# Patient Record
Sex: FEMALE | Race: WHITE | NOT HISPANIC OR LATINO
[De-identification: names, ages, dates, MRNs, and addresses within clinical notes are randomized per-mention and may not be internally consistent; named-entity substitution may affect disease eponyms.]

---

## 2019-08-08 PROBLEM — Z00.00 ENCOUNTER FOR PREVENTIVE HEALTH EXAMINATION: Status: ACTIVE | Noted: 2019-08-08

## 2019-09-04 ENCOUNTER — APPOINTMENT (OUTPATIENT)
Dept: VASCULAR SURGERY | Facility: CLINIC | Age: 79
End: 2019-09-04

## 2023-12-29 ENCOUNTER — INPATIENT (INPATIENT)
Facility: HOSPITAL | Age: 83
LOS: 122 days | Discharge: NURSING, REHAB OR LONG-TERM CARE | End: 2024-04-30
Attending: INTERNAL MEDICINE | Admitting: INTERNAL MEDICINE
Payer: MEDICARE

## 2023-12-29 VITALS
RESPIRATION RATE: 16 BRPM | SYSTOLIC BLOOD PRESSURE: 158 MMHG | HEART RATE: 68 BPM | DIASTOLIC BLOOD PRESSURE: 93 MMHG | TEMPERATURE: 98 F | OXYGEN SATURATION: 100 %

## 2023-12-29 DIAGNOSIS — Z09 ENCOUNTER FOR FOLLOW-UP EXAMINATION AFTER COMPLETED TREATMENT FOR CONDITIONS OTHER THAN MALIGNANT NEOPLASM: ICD-10-CM

## 2023-12-29 DIAGNOSIS — G93.41 METABOLIC ENCEPHALOPATHY: ICD-10-CM

## 2023-12-29 DIAGNOSIS — N30.00 ACUTE CYSTITIS WITHOUT HEMATURIA: ICD-10-CM

## 2023-12-29 DIAGNOSIS — N39.0 URINARY TRACT INFECTION, SITE NOT SPECIFIED: ICD-10-CM

## 2023-12-29 LAB
ALBUMIN SERPL ELPH-MCNC: 3.6 G/DL — SIGNIFICANT CHANGE UP (ref 3.3–5)
ALBUMIN SERPL ELPH-MCNC: 3.6 G/DL — SIGNIFICANT CHANGE UP (ref 3.3–5)
ALP SERPL-CCNC: 41 U/L — SIGNIFICANT CHANGE UP (ref 40–120)
ALP SERPL-CCNC: 41 U/L — SIGNIFICANT CHANGE UP (ref 40–120)
ALT FLD-CCNC: 8 U/L — SIGNIFICANT CHANGE UP (ref 4–33)
ALT FLD-CCNC: 8 U/L — SIGNIFICANT CHANGE UP (ref 4–33)
ANION GAP SERPL CALC-SCNC: 10 MMOL/L — SIGNIFICANT CHANGE UP (ref 7–14)
ANION GAP SERPL CALC-SCNC: 10 MMOL/L — SIGNIFICANT CHANGE UP (ref 7–14)
APPEARANCE UR: ABNORMAL
APPEARANCE UR: ABNORMAL
AST SERPL-CCNC: 20 U/L — SIGNIFICANT CHANGE UP (ref 4–32)
AST SERPL-CCNC: 20 U/L — SIGNIFICANT CHANGE UP (ref 4–32)
BACTERIA # UR AUTO: ABNORMAL /HPF
BACTERIA # UR AUTO: ABNORMAL /HPF
BASOPHILS # BLD AUTO: 0.02 K/UL — SIGNIFICANT CHANGE UP (ref 0–0.2)
BASOPHILS # BLD AUTO: 0.02 K/UL — SIGNIFICANT CHANGE UP (ref 0–0.2)
BASOPHILS NFR BLD AUTO: 0.2 % — SIGNIFICANT CHANGE UP (ref 0–2)
BASOPHILS NFR BLD AUTO: 0.2 % — SIGNIFICANT CHANGE UP (ref 0–2)
BILIRUB SERPL-MCNC: 0.4 MG/DL — SIGNIFICANT CHANGE UP (ref 0.2–1.2)
BILIRUB SERPL-MCNC: 0.4 MG/DL — SIGNIFICANT CHANGE UP (ref 0.2–1.2)
BILIRUB UR-MCNC: NEGATIVE — SIGNIFICANT CHANGE UP
BILIRUB UR-MCNC: NEGATIVE — SIGNIFICANT CHANGE UP
BUN SERPL-MCNC: 19 MG/DL — SIGNIFICANT CHANGE UP (ref 7–23)
BUN SERPL-MCNC: 19 MG/DL — SIGNIFICANT CHANGE UP (ref 7–23)
CALCIUM SERPL-MCNC: 9 MG/DL — SIGNIFICANT CHANGE UP (ref 8.4–10.5)
CALCIUM SERPL-MCNC: 9 MG/DL — SIGNIFICANT CHANGE UP (ref 8.4–10.5)
CAST: 5 /LPF — HIGH (ref 0–4)
CAST: 5 /LPF — HIGH (ref 0–4)
CHLORIDE SERPL-SCNC: 102 MMOL/L — SIGNIFICANT CHANGE UP (ref 98–107)
CHLORIDE SERPL-SCNC: 102 MMOL/L — SIGNIFICANT CHANGE UP (ref 98–107)
CO2 SERPL-SCNC: 28 MMOL/L — SIGNIFICANT CHANGE UP (ref 22–31)
CO2 SERPL-SCNC: 28 MMOL/L — SIGNIFICANT CHANGE UP (ref 22–31)
COD CRY URNS QL: PRESENT
COD CRY URNS QL: PRESENT
COLOR SPEC: SIGNIFICANT CHANGE UP
COLOR SPEC: SIGNIFICANT CHANGE UP
CREAT SERPL-MCNC: 0.83 MG/DL — SIGNIFICANT CHANGE UP (ref 0.5–1.3)
CREAT SERPL-MCNC: 0.83 MG/DL — SIGNIFICANT CHANGE UP (ref 0.5–1.3)
DIFF PNL FLD: ABNORMAL
DIFF PNL FLD: ABNORMAL
EGFR: 70 ML/MIN/1.73M2 — SIGNIFICANT CHANGE UP
EGFR: 70 ML/MIN/1.73M2 — SIGNIFICANT CHANGE UP
EOSINOPHIL # BLD AUTO: 0.03 K/UL — SIGNIFICANT CHANGE UP (ref 0–0.5)
EOSINOPHIL # BLD AUTO: 0.03 K/UL — SIGNIFICANT CHANGE UP (ref 0–0.5)
EOSINOPHIL NFR BLD AUTO: 0.3 % — SIGNIFICANT CHANGE UP (ref 0–6)
EOSINOPHIL NFR BLD AUTO: 0.3 % — SIGNIFICANT CHANGE UP (ref 0–6)
GLUCOSE SERPL-MCNC: 126 MG/DL — HIGH (ref 70–99)
GLUCOSE SERPL-MCNC: 126 MG/DL — HIGH (ref 70–99)
GLUCOSE UR QL: NEGATIVE MG/DL — SIGNIFICANT CHANGE UP
GLUCOSE UR QL: NEGATIVE MG/DL — SIGNIFICANT CHANGE UP
HCT VFR BLD CALC: 37.1 % — SIGNIFICANT CHANGE UP (ref 34.5–45)
HCT VFR BLD CALC: 37.1 % — SIGNIFICANT CHANGE UP (ref 34.5–45)
HGB BLD-MCNC: 12.6 G/DL — SIGNIFICANT CHANGE UP (ref 11.5–15.5)
HGB BLD-MCNC: 12.6 G/DL — SIGNIFICANT CHANGE UP (ref 11.5–15.5)
HYALINE CASTS # UR AUTO: PRESENT
HYALINE CASTS # UR AUTO: PRESENT
IANC: 6.64 K/UL — SIGNIFICANT CHANGE UP (ref 1.8–7.4)
IANC: 6.64 K/UL — SIGNIFICANT CHANGE UP (ref 1.8–7.4)
IMM GRANULOCYTES NFR BLD AUTO: 0.3 % — SIGNIFICANT CHANGE UP (ref 0–0.9)
IMM GRANULOCYTES NFR BLD AUTO: 0.3 % — SIGNIFICANT CHANGE UP (ref 0–0.9)
KETONES UR-MCNC: 15 MG/DL
KETONES UR-MCNC: 15 MG/DL
LEUKOCYTE ESTERASE UR-ACNC: ABNORMAL
LEUKOCYTE ESTERASE UR-ACNC: ABNORMAL
LYMPHOCYTES # BLD AUTO: 1.17 K/UL — SIGNIFICANT CHANGE UP (ref 1–3.3)
LYMPHOCYTES # BLD AUTO: 1.17 K/UL — SIGNIFICANT CHANGE UP (ref 1–3.3)
LYMPHOCYTES # BLD AUTO: 13.6 % — SIGNIFICANT CHANGE UP (ref 13–44)
LYMPHOCYTES # BLD AUTO: 13.6 % — SIGNIFICANT CHANGE UP (ref 13–44)
MCHC RBC-ENTMCNC: 30.3 PG — SIGNIFICANT CHANGE UP (ref 27–34)
MCHC RBC-ENTMCNC: 30.3 PG — SIGNIFICANT CHANGE UP (ref 27–34)
MCHC RBC-ENTMCNC: 34 GM/DL — SIGNIFICANT CHANGE UP (ref 32–36)
MCHC RBC-ENTMCNC: 34 GM/DL — SIGNIFICANT CHANGE UP (ref 32–36)
MCV RBC AUTO: 89.2 FL — SIGNIFICANT CHANGE UP (ref 80–100)
MCV RBC AUTO: 89.2 FL — SIGNIFICANT CHANGE UP (ref 80–100)
MONOCYTES # BLD AUTO: 0.73 K/UL — SIGNIFICANT CHANGE UP (ref 0–0.9)
MONOCYTES # BLD AUTO: 0.73 K/UL — SIGNIFICANT CHANGE UP (ref 0–0.9)
MONOCYTES NFR BLD AUTO: 8.5 % — SIGNIFICANT CHANGE UP (ref 2–14)
MONOCYTES NFR BLD AUTO: 8.5 % — SIGNIFICANT CHANGE UP (ref 2–14)
NEUTROPHILS # BLD AUTO: 6.64 K/UL — SIGNIFICANT CHANGE UP (ref 1.8–7.4)
NEUTROPHILS # BLD AUTO: 6.64 K/UL — SIGNIFICANT CHANGE UP (ref 1.8–7.4)
NEUTROPHILS NFR BLD AUTO: 77.1 % — HIGH (ref 43–77)
NEUTROPHILS NFR BLD AUTO: 77.1 % — HIGH (ref 43–77)
NITRITE UR-MCNC: NEGATIVE — SIGNIFICANT CHANGE UP
NITRITE UR-MCNC: NEGATIVE — SIGNIFICANT CHANGE UP
NRBC # BLD: 0 /100 WBCS — SIGNIFICANT CHANGE UP (ref 0–0)
NRBC # BLD: 0 /100 WBCS — SIGNIFICANT CHANGE UP (ref 0–0)
NRBC # FLD: 0 K/UL — SIGNIFICANT CHANGE UP (ref 0–0)
NRBC # FLD: 0 K/UL — SIGNIFICANT CHANGE UP (ref 0–0)
PH UR: 5.5 — SIGNIFICANT CHANGE UP (ref 5–8)
PH UR: 5.5 — SIGNIFICANT CHANGE UP (ref 5–8)
PLATELET # BLD AUTO: 208 K/UL — SIGNIFICANT CHANGE UP (ref 150–400)
PLATELET # BLD AUTO: 208 K/UL — SIGNIFICANT CHANGE UP (ref 150–400)
POTASSIUM SERPL-MCNC: 3.5 MMOL/L — SIGNIFICANT CHANGE UP (ref 3.5–5.3)
POTASSIUM SERPL-MCNC: 3.5 MMOL/L — SIGNIFICANT CHANGE UP (ref 3.5–5.3)
POTASSIUM SERPL-SCNC: 3.5 MMOL/L — SIGNIFICANT CHANGE UP (ref 3.5–5.3)
POTASSIUM SERPL-SCNC: 3.5 MMOL/L — SIGNIFICANT CHANGE UP (ref 3.5–5.3)
PROT SERPL-MCNC: 6.4 G/DL — SIGNIFICANT CHANGE UP (ref 6–8.3)
PROT SERPL-MCNC: 6.4 G/DL — SIGNIFICANT CHANGE UP (ref 6–8.3)
PROT UR-MCNC: SIGNIFICANT CHANGE UP MG/DL
PROT UR-MCNC: SIGNIFICANT CHANGE UP MG/DL
RBC # BLD: 4.16 M/UL — SIGNIFICANT CHANGE UP (ref 3.8–5.2)
RBC # BLD: 4.16 M/UL — SIGNIFICANT CHANGE UP (ref 3.8–5.2)
RBC # FLD: 13.1 % — SIGNIFICANT CHANGE UP (ref 10.3–14.5)
RBC # FLD: 13.1 % — SIGNIFICANT CHANGE UP (ref 10.3–14.5)
RBC CASTS # UR COMP ASSIST: 12 /HPF — HIGH (ref 0–4)
RBC CASTS # UR COMP ASSIST: 12 /HPF — HIGH (ref 0–4)
REVIEW: SIGNIFICANT CHANGE UP
REVIEW: SIGNIFICANT CHANGE UP
SODIUM SERPL-SCNC: 140 MMOL/L — SIGNIFICANT CHANGE UP (ref 135–145)
SODIUM SERPL-SCNC: 140 MMOL/L — SIGNIFICANT CHANGE UP (ref 135–145)
SP GR SPEC: 1.03 — SIGNIFICANT CHANGE UP (ref 1–1.03)
SP GR SPEC: 1.03 — SIGNIFICANT CHANGE UP (ref 1–1.03)
SQUAMOUS # UR AUTO: 12 /HPF — HIGH (ref 0–5)
SQUAMOUS # UR AUTO: 12 /HPF — HIGH (ref 0–5)
UROBILINOGEN FLD QL: 1 MG/DL — SIGNIFICANT CHANGE UP (ref 0.2–1)
UROBILINOGEN FLD QL: 1 MG/DL — SIGNIFICANT CHANGE UP (ref 0.2–1)
WBC # BLD: 8.62 K/UL — SIGNIFICANT CHANGE UP (ref 3.8–10.5)
WBC # BLD: 8.62 K/UL — SIGNIFICANT CHANGE UP (ref 3.8–10.5)
WBC # FLD AUTO: 8.62 K/UL — SIGNIFICANT CHANGE UP (ref 3.8–10.5)
WBC # FLD AUTO: 8.62 K/UL — SIGNIFICANT CHANGE UP (ref 3.8–10.5)
WBC UR QL: 19 /HPF — HIGH (ref 0–5)
WBC UR QL: 19 /HPF — HIGH (ref 0–5)

## 2023-12-29 PROCEDURE — 99223 1ST HOSP IP/OBS HIGH 75: CPT

## 2023-12-29 PROCEDURE — 99285 EMERGENCY DEPT VISIT HI MDM: CPT

## 2023-12-29 RX ORDER — HALOPERIDOL DECANOATE 100 MG/ML
1.5 INJECTION INTRAMUSCULAR ONCE
Refills: 0 | Status: COMPLETED | OUTPATIENT
Start: 2023-12-29 | End: 2023-12-29

## 2023-12-29 RX ORDER — ACETAMINOPHEN 500 MG
650 TABLET ORAL EVERY 6 HOURS
Refills: 0 | Status: DISCONTINUED | OUTPATIENT
Start: 2023-12-29 | End: 2024-03-07

## 2023-12-29 RX ORDER — CEFTRIAXONE 500 MG/1
1000 INJECTION, POWDER, FOR SOLUTION INTRAMUSCULAR; INTRAVENOUS ONCE
Refills: 0 | Status: DISCONTINUED | OUTPATIENT
Start: 2023-12-29 | End: 2023-12-29

## 2023-12-29 RX ORDER — HALOPERIDOL DECANOATE 100 MG/ML
1 INJECTION INTRAMUSCULAR ONCE
Refills: 0 | Status: COMPLETED | OUTPATIENT
Start: 2023-12-29 | End: 2023-12-29

## 2023-12-29 RX ORDER — OLANZAPINE 15 MG/1
1.25 TABLET, FILM COATED ORAL ONCE
Refills: 0 | Status: COMPLETED | OUTPATIENT
Start: 2023-12-29 | End: 2023-12-29

## 2023-12-29 RX ORDER — CEFTRIAXONE 500 MG/1
1000 INJECTION, POWDER, FOR SOLUTION INTRAMUSCULAR; INTRAVENOUS EVERY 24 HOURS
Refills: 0 | Status: DISCONTINUED | OUTPATIENT
Start: 2023-12-30 | End: 2024-01-01

## 2023-12-29 RX ORDER — HALOPERIDOL DECANOATE 100 MG/ML
2.5 INJECTION INTRAMUSCULAR ONCE
Refills: 0 | Status: COMPLETED | OUTPATIENT
Start: 2023-12-29 | End: 2023-12-29

## 2023-12-29 RX ORDER — CEFTRIAXONE 500 MG/1
1000 INJECTION, POWDER, FOR SOLUTION INTRAMUSCULAR; INTRAVENOUS ONCE
Refills: 0 | Status: COMPLETED | OUTPATIENT
Start: 2023-12-29 | End: 2023-12-29

## 2023-12-29 RX ADMIN — HALOPERIDOL DECANOATE 1 MILLIGRAM(S): 100 INJECTION INTRAMUSCULAR at 19:24

## 2023-12-29 RX ADMIN — CEFTRIAXONE 100 MILLIGRAM(S): 500 INJECTION, POWDER, FOR SOLUTION INTRAMUSCULAR; INTRAVENOUS at 17:24

## 2023-12-29 RX ADMIN — HALOPERIDOL DECANOATE 2.5 MILLIGRAM(S): 100 INJECTION INTRAMUSCULAR at 20:32

## 2023-12-29 RX ADMIN — OLANZAPINE 1.25 MILLIGRAM(S): 15 TABLET, FILM COATED ORAL at 20:42

## 2023-12-29 RX ADMIN — HALOPERIDOL DECANOATE 1.5 MILLIGRAM(S): 100 INJECTION INTRAMUSCULAR at 19:46

## 2023-12-29 RX ADMIN — Medication 0.5 MILLIGRAM(S): at 21:02

## 2023-12-29 NOTE — ED ADULT NURSE NOTE - OBJECTIVE STATEMENT
Patient is an 84 yo female, unknown phx, brought in after found wandering in street. 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 Unkempt. Crying and asking if someone is going to be watching her dogs. Denies any complaints, denies pain. No signs of injury. VSS. 20G PIV placed to [facility], labs sent per orders. Pending results. Fall precautions maintained. Report given to primary RN Jena. Patient is an 84 yo female, unknown phx, brought in after found wandering in street. 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 Unkempt. Crying and asking if someone is going to be watching her dogs. Denies any complaints, denies pain. No signs of injury. VSS. 20G PIV placed to [facility], labs sent per orders. Pending results. Fall precautions maintained. Report given to primary RN Jena.

## 2023-12-29 NOTE — H&P ADULT - PROBLEM SELECTOR PLAN 1
Likely delirium 2/2 acute infection with underlying dementia.  - treatment of uti as below  - PRN Haldol  - will give 1 dose of ativan as pt not improving after Haldol and Zyprexa.

## 2023-12-29 NOTE — ED PROVIDER NOTE - CONSIDERATION OF ADMISSION OBSERVATION
Consideration of Admission/Observation Given concern for AMS and pt living alone, will likely require admission for further management/placement

## 2023-12-29 NOTE — ED ADULT TRIAGE NOTE - CHIEF COMPLAINT QUOTE
Pt. found wandering by her neighbors. Stated she was walking with her aide but when EMS got there was nobody with her. Pt. has no complaints at this time. Pt. A&Ox2-3. Unknown history or baseline MS.

## 2023-12-29 NOTE — ED PROVIDER NOTE - CARE PLAN
1 Principal Discharge DX:	Acute UTI  Secondary Diagnosis:	AMS (altered mental status)  Secondary Diagnosis:	Adult failure to thrive

## 2023-12-29 NOTE — ED PROVIDER NOTE - OBJECTIVE STATEMENT
84 y/o female with PMHx of Chololithiasis s/p cholecystectomy who presented to the ED for AMS today. Pt was found wondering today and when EMS arrived pt states she was walking with her aid but was not found with anyone. Pt states she has to take care of her dog. Pt denies any headache, neck pain, back pain, fever, chills, nausea, vomiting, SOB, chest pain, or abd pain. 82 y/o female with PMHx of Chololithiasis s/p cholecystectomy who presented to the ED for AMS today. Pt was found wondering today and when EMS arrived pt states she was walking with her aid but was not found with anyone. Pt states she has to take care of her dog. Pt denies any headache, neck pain, back pain, fever, chills, nausea, vomiting, SOB, chest pain, or abd pain. 84 y/o female with PMHx of Chololithiasis s/p cholecystectomy who presented to the ED for AMS today. Pt was found wondering today and when EMS arrived pt states she was walking with her aid but was not found with anyone. Pt states she has to take care of her dog. Pt denies any headache, neck pain, back pain, fever, chills, nausea, vomiting, SOB, chest pain, or abd pain.    Yadiel Mooney MD PGY-2: 83-year-old female with PMH likely dementia, no official diagnosis presents brought in by EMS because she was found wandering by her neighbors and seemed confused.  Patient does not remember what happened or why she is in the hospital at this time.  Collateral information obtained from patient's niece as well as patient's  who called.   is Nicole: [phone].  This [facility] her  [facility] [employer] [facility]   states that the patient has no one in her life to help her out and no family who takes care of her.  Patient lives alone.   has known patient for the past 5 years or so.   has made about 5 attempts over the past few years to Adult Protective Services however her case has been rejected each time.  States that patient has poor memory and does not go to any doctors.  Patient has not had any official diagnoses.   is aware of a possible psych diagnosis that happened 1 and half years ago but never got more information due to confidentiality.   talk to the patient weekly.  States that patient has 2 friends or neighbors who help out sometimes.  Patient has 1 dog.   talk to the superintendent of the patient's residence who stated that he will take care of the dog for the weekend.  Patient has become lost for a few times in the past.   department brings food to the patient sometimes and has apparently a visitor service however patient has been verbally aggressive and abusive towards the visitors in the past.  Collateral also obtained from patient's niece Leatha at [phone].  He states that she has not been to [place] in the last 18 years and is not really in touch with the patient.  States that patient has a .  Last talk to the patient a few months ago but does not know much about her history but think she has dementia.  States that patient has a sister named Leatha Francis in the [facility] but does not have contact information with her.  Collateral information also obtained by bystander who found patient wandering outside of few food grocery store.  Bystander is nurse Paulino at [phone].  Nurse Lora found her outside during shopping and the patient seemed confused and did not know where she was.  Patient was able to take Lora to her home.  Nurse Lora states that home was in a poor state, no bed sheets on the bed, was dirty but not unlivable, and believes is unsafe for patient to live at home by herself.  Patient's PCP is SUZANNA CACERES and at [phone]. 82 y/o female with PMHx of Chololithiasis s/p cholecystectomy who presented to the ED for AMS today. Pt was found wondering today and when EMS arrived pt states she was walking with her aid but was not found with anyone. Pt states she has to take care of her dog. Pt denies any headache, neck pain, back pain, fever, chills, nausea, vomiting, SOB, chest pain, or abd pain.    Yadiel Mooney MD PGY-2: 83-year-old female with PMH likely dementia, no official diagnosis presents brought in by EMS because she was found wandering by her neighbors and seemed confused.  Patient does not remember what happened or why she is in the hospital at this time.  Collateral information obtained from patient's niece as well as patient's  who called.   is Nicole: [phone].  This is her  [facility] [employer] [facility].   states that the patient has no one in her life to help her out and no family who takes care of her.  Patient lives alone.   has known patient for the past 5 years or so.   has made about 5 attempts over the past few years to Adult Protective Services however her case has been rejected each time.  States that patient has poor memory and does not go to any doctors.  Patient has not had any official diagnoses.   is aware of a possible psych diagnosis that happened 1 and half years ago but never got more information due to confidentiality.   talk to the patient weekly.  States that patient has 2 friends or neighbors who help out sometimes.  Patient has 1 dog.   talk to the superintendent of the patient's residence who stated that he will take care of the dog for the weekend.  Patient has become lost for a few times in the past.   department brings food to the patient sometimes and has apparently a visitor service however patient has been verbally aggressive and abusive towards the visitors in the past.  Collateral also obtained from patient's niece Leatha at [phone].  He states that she has not been to [place] in the last 18 years and is not really in touch with the patient.  States that patient has a .  Last talk to the patient a few months ago but does not know much about her history but think she has dementia.  States that patient has a sister named Leatha Francis in the [facility] but does not have contact information with her.  Collateral information also obtained by bystander who found patient wandering outside of few food grocery store.  Bystander is nurse Paulino at [phone].  Nurse Lora found her outside during shopping and the patient seemed confused and did not know where she was.  Patient was able to take Lora to her home.  Nurse Lora states that home was in a poor state, no bed sheets on the bed, was dirty but not unlivable, and believes is unsafe for patient to live at home by herself.  Patient's PCP is SUZANNA CACERES and at [phone].

## 2023-12-29 NOTE — H&P ADULT - NSHPPHYSICALEXAM_GEN_ALL_CORE
Vital Signs Last 24 Hrs  T(C): 36.8 (29 Dec 2023 21:13), Max: 36.9 (29 Dec 2023 14:46)  T(F): 98.3 (29 Dec 2023 21:13), Max: 98.4 (29 Dec 2023 14:46)  HR: 90 (29 Dec 2023 21:13) (68 - 90)  BP: 120/56 (29 Dec 2023 21:13) (120/56 - 158/93)  BP(mean): --  RR: 16 (29 Dec 2023 21:13) (16 - 16)  SpO2: 98% (29 Dec 2023 21:13) (98% - 100%)    Parameters below as of 29 Dec 2023 21:13  Patient On (Oxygen Delivery Method): room air        PHYSICAL EXAM:  GENERAL: No Acute Distress  EYES: conjunctiva and sclera clear  ENMT: Moist mucous membranes   NECK: Supple  PULMONARY: Clear to auscultation bilaterally  CARDIAC: Regular rate and rhythm; No murmurs, rubs, or gallops  GASTROINTESTINAL: Abdomen soft, Nontender, Nondistended; Bowel sounds normal  EXTREMITIES:   No clubbing, cyanosis, or pedal edema  PSYCH: Agitated   NEUROLOGY:   - Mental status A&O x 1  SKIN: No rashes or lesions  MUSCULOSKELETAL: No joint swelling

## 2023-12-29 NOTE — ED ADULT NURSE NOTE - NSFALLHARMRISKINTERV_ED_ALL_ED
Communicate risk of Fall with Harm to all staff, patient, and family/Provide visual cue: red socks, yellow wristband, yellow gown, etc/Reinforce activity limits and safety measures with patient and family/Bed in lowest position, wheels locked, appropriate side rails in place/Call bell, personal items and telephone in reach/Instruct patient to call for assistance before getting out of bed/chair/stretcher/Non-slip footwear applied when patient is off stretcher/[place] to call system/Physically safe environment - no spills, clutter or unnecessary equipment/Purposeful Proactive Rounding/Room/bathroom lighting operational, light cord in reach Communicate risk of Fall with Harm to all staff, patient, and family/Provide visual cue: red socks, yellow wristband, yellow gown, etc/Reinforce activity limits and safety measures with patient and family/Bed in lowest position, wheels locked, appropriate side rails in place/Call bell, personal items and telephone in reach/Instruct patient to call for assistance before getting out of bed/chair/stretcher/Non-slip footwear applied when patient is off stretcher/[place] to call system/Physically safe environment - no spills, clutter or unnecessary equipment/Purposeful Proactive Rounding/Room/bathroom lighting operational, light cord in reach

## 2023-12-29 NOTE — ED PROVIDER NOTE - ATTENDING CONTRIBUTION TO CARE
Pt was seen and evaluated by me. Pt is a 82 y/o female with PMHx of Chololithiasis s/p cholecystectomy who presented to the ED for AMS today. Pt was found wondering today and when EMS arrived pt states she was walking with her aid but was not found with anyone. Pt states she has to take care of her dog. Pt denies any headache, neck pain, back pain, fever, chills, nausea, vomiting, SOB, chest pain, or abd pain.  VITALS: Vitals have been reviewed.  GEN APPEARANCE: Awake and oriented to person and place, non-toxic appearing and in NAD  HEAD: Atraumatic, normocephalic.   EYES: PERRL, EOMI.   EARS: Gross hearing intact.   NOSE: No nasal discharge.   THROAT: MMM. Oral cavity and pharynx normal.   CV: RRR, S1S2, no c/r/m/g. No cyanosis or pallor.   LUNGS: CTAB. No wheezing. No rales. No rhonchi. No diminished breath sounds.   ABDOMEN: Soft, NTND. No guarding or rebound.   MSK/EXT: Spine appears normal, no spine point tenderness. 5/5 b/l UE and LE  NEURO: Awake and oriented to person and place. No focal deficits.   SKIN: Normal color for race, warm, dry and intact. No evidence of rash.  82 y/o female with PMHx of Chololithiasis s/p cholecystectomy who presented to the ED for AMS today.   Concern for Dementia/UTI/Metabolic Derangement  Labs, UA Pt was seen and evaluated by me. Pt is a 84 y/o female with PMHx of Chololithiasis s/p cholecystectomy who presented to the ED for AMS today. Pt was found wondering today and when EMS arrived pt states she was walking with her aid but was not found with anyone. Pt states she has to take care of her dog. Pt denies any headache, neck pain, back pain, fever, chills, nausea, vomiting, SOB, chest pain, or abd pain.  VITALS: Vitals have been reviewed.  GEN APPEARANCE: Awake and oriented to person and place, non-toxic appearing and in NAD  HEAD: Atraumatic, normocephalic.   EYES: PERRL, EOMI.   EARS: Gross hearing intact.   NOSE: No nasal discharge.   THROAT: MMM. Oral cavity and pharynx normal.   CV: RRR, S1S2, no c/r/m/g. No cyanosis or pallor.   LUNGS: CTAB. No wheezing. No rales. No rhonchi. No diminished breath sounds.   ABDOMEN: Soft, NTND. No guarding or rebound.   MSK/EXT: Spine appears normal, no spine point tenderness. 5/5 b/l UE and LE  NEURO: Awake and oriented to person and place. No focal deficits.   SKIN: Normal color for race, warm, dry and intact. No evidence of rash.  84 y/o female with PMHx of Chololithiasis s/p cholecystectomy who presented to the ED for AMS today.   Concern for Dementia/UTI/Metabolic Derangement  Labs, UA

## 2023-12-29 NOTE — ED PROVIDER NOTE - CLINICAL SUMMARY MEDICAL DECISION MAKING FREE TEXT BOX
84 y/o female with PMHx of Chololithiasis s/p cholecystectomy who presented to the ED for AMS today.   Concern for Dementia/UTI/Metabolic Derangement  Labs, UA 82 y/o female with PMHx of Chololithiasis s/p cholecystectomy who presented to the ED for AMS today.   Concern for Dementia/UTI/Metabolic Derangement  Labs, UA    Yadiel Mooney MD PGY-2: 83-year-old female with unknown past medical history found confused wandering outside.  Patient lives alone and has no help from family members.  Discussed with .  See HPI.  Patient will need basic labs, UA/UC and admit for placement as it is unsafe for patient to live alone and she is ANO x 1 does not have decisional making capacity. 84 y/o female with PMHx of Chololithiasis s/p cholecystectomy who presented to the ED for AMS today.   Concern for Dementia/UTI/Metabolic Derangement  Labs, UA    Yadiel Mooney MD PGY-2: 83-year-old female with unknown past medical history found confused wandering outside.  Patient lives alone and has no help from family members.  Discussed with .  See HPI.  Patient will need basic labs, UA/UC and admit for placement as it is unsafe for patient to live alone and she is ANO x 1 does not have decisional making capacity.

## 2023-12-29 NOTE — H&P ADULT - PROBLEM SELECTOR PLAN 3
Pt has outpatient  attempting to obtain more services for patient  - Will need CM and SW in hospital to arrange for services.   - appears pt has no local family.

## 2023-12-29 NOTE — ED PROVIDER NOTE - PROGRESS NOTE DETAILS
Yadiel Mooney MD PGY-2: UA concerning for UTI. Will treat. Paged hospitalist for admission for UTI and need for placement.

## 2023-12-29 NOTE — H&P ADULT - NSHPLABSRESULTS_GEN_ALL_CORE
12.6   8.62  )-----------( 208      ( 29 Dec 2023 14:27 )             37.1     140  |  102  |  19  ----------------------------<  126<H>       3.5   |  28  |  0.83    Ca    9.0      29 Dec 2023 14:27    TPro  6.4  /  Alb  3.6  /  TBili  0.4  /  DBili  x   /  AST  20  /  ALT  8   /  AlkPhos  41                  Urinalysis Basic - ( 29 Dec 2023 14:41 )  Color: Dark Yellow / Appearance: Turbid / S.026 / pH: 5.5  Gluc: Negative mg/dL / Ketone: 15 mg/dL  / Bili: Negative / Urobili: 1.0 mg/dL   Blood: Trace / Protein: Trace mg/dL / Nitrite: Negative   Leuk Esterase: Moderate / RBC: 12 /HPF / WBC 19 /HPF   Sq Epi: x / Non Sq Epi: x / Bacteria: Few /HPF

## 2023-12-30 LAB
ANION GAP SERPL CALC-SCNC: 12 MMOL/L — SIGNIFICANT CHANGE UP (ref 7–14)
ANION GAP SERPL CALC-SCNC: 12 MMOL/L — SIGNIFICANT CHANGE UP (ref 7–14)
B PERT DNA SPEC QL NAA+PROBE: SIGNIFICANT CHANGE UP
B PERT DNA SPEC QL NAA+PROBE: SIGNIFICANT CHANGE UP
B PERT+PARAPERT DNA PNL SPEC NAA+PROBE: SIGNIFICANT CHANGE UP
B PERT+PARAPERT DNA PNL SPEC NAA+PROBE: SIGNIFICANT CHANGE UP
BASOPHILS # BLD AUTO: 0.01 K/UL — SIGNIFICANT CHANGE UP (ref 0–0.2)
BASOPHILS # BLD AUTO: 0.01 K/UL — SIGNIFICANT CHANGE UP (ref 0–0.2)
BASOPHILS NFR BLD AUTO: 0.2 % — SIGNIFICANT CHANGE UP (ref 0–2)
BASOPHILS NFR BLD AUTO: 0.2 % — SIGNIFICANT CHANGE UP (ref 0–2)
BORDETELLA PARAPERTUSSIS (RAPRVP): SIGNIFICANT CHANGE UP
BORDETELLA PARAPERTUSSIS (RAPRVP): SIGNIFICANT CHANGE UP
BUN SERPL-MCNC: 15 MG/DL — SIGNIFICANT CHANGE UP (ref 7–23)
BUN SERPL-MCNC: 15 MG/DL — SIGNIFICANT CHANGE UP (ref 7–23)
C PNEUM DNA SPEC QL NAA+PROBE: SIGNIFICANT CHANGE UP
C PNEUM DNA SPEC QL NAA+PROBE: SIGNIFICANT CHANGE UP
CALCIUM SERPL-MCNC: 8.7 MG/DL — SIGNIFICANT CHANGE UP (ref 8.4–10.5)
CALCIUM SERPL-MCNC: 8.7 MG/DL — SIGNIFICANT CHANGE UP (ref 8.4–10.5)
CHLORIDE SERPL-SCNC: 107 MMOL/L — SIGNIFICANT CHANGE UP (ref 98–107)
CHLORIDE SERPL-SCNC: 107 MMOL/L — SIGNIFICANT CHANGE UP (ref 98–107)
CO2 SERPL-SCNC: 24 MMOL/L — SIGNIFICANT CHANGE UP (ref 22–31)
CO2 SERPL-SCNC: 24 MMOL/L — SIGNIFICANT CHANGE UP (ref 22–31)
CREAT SERPL-MCNC: 0.74 MG/DL — SIGNIFICANT CHANGE UP (ref 0.5–1.3)
CREAT SERPL-MCNC: 0.74 MG/DL — SIGNIFICANT CHANGE UP (ref 0.5–1.3)
EGFR: 80 ML/MIN/1.73M2 — SIGNIFICANT CHANGE UP
EGFR: 80 ML/MIN/1.73M2 — SIGNIFICANT CHANGE UP
EOSINOPHIL # BLD AUTO: 0.06 K/UL — SIGNIFICANT CHANGE UP (ref 0–0.5)
EOSINOPHIL # BLD AUTO: 0.06 K/UL — SIGNIFICANT CHANGE UP (ref 0–0.5)
EOSINOPHIL NFR BLD AUTO: 1 % — SIGNIFICANT CHANGE UP (ref 0–6)
EOSINOPHIL NFR BLD AUTO: 1 % — SIGNIFICANT CHANGE UP (ref 0–6)
FLUAV SUBTYP SPEC NAA+PROBE: SIGNIFICANT CHANGE UP
FLUAV SUBTYP SPEC NAA+PROBE: SIGNIFICANT CHANGE UP
FLUBV RNA SPEC QL NAA+PROBE: SIGNIFICANT CHANGE UP
FLUBV RNA SPEC QL NAA+PROBE: SIGNIFICANT CHANGE UP
GLUCOSE SERPL-MCNC: 82 MG/DL — SIGNIFICANT CHANGE UP (ref 70–99)
GLUCOSE SERPL-MCNC: 82 MG/DL — SIGNIFICANT CHANGE UP (ref 70–99)
HADV DNA SPEC QL NAA+PROBE: SIGNIFICANT CHANGE UP
HADV DNA SPEC QL NAA+PROBE: SIGNIFICANT CHANGE UP
HCOV 229E RNA SPEC QL NAA+PROBE: SIGNIFICANT CHANGE UP
HCOV 229E RNA SPEC QL NAA+PROBE: SIGNIFICANT CHANGE UP
HCOV HKU1 RNA SPEC QL NAA+PROBE: SIGNIFICANT CHANGE UP
HCOV HKU1 RNA SPEC QL NAA+PROBE: SIGNIFICANT CHANGE UP
HCOV NL63 RNA SPEC QL NAA+PROBE: SIGNIFICANT CHANGE UP
HCOV NL63 RNA SPEC QL NAA+PROBE: SIGNIFICANT CHANGE UP
HCOV OC43 RNA SPEC QL NAA+PROBE: SIGNIFICANT CHANGE UP
HCOV OC43 RNA SPEC QL NAA+PROBE: SIGNIFICANT CHANGE UP
HCT VFR BLD CALC: 36.9 % — SIGNIFICANT CHANGE UP (ref 34.5–45)
HCT VFR BLD CALC: 36.9 % — SIGNIFICANT CHANGE UP (ref 34.5–45)
HGB BLD-MCNC: 12.3 G/DL — SIGNIFICANT CHANGE UP (ref 11.5–15.5)
HGB BLD-MCNC: 12.3 G/DL — SIGNIFICANT CHANGE UP (ref 11.5–15.5)
HMPV RNA SPEC QL NAA+PROBE: SIGNIFICANT CHANGE UP
HMPV RNA SPEC QL NAA+PROBE: SIGNIFICANT CHANGE UP
HPIV1 RNA SPEC QL NAA+PROBE: SIGNIFICANT CHANGE UP
HPIV1 RNA SPEC QL NAA+PROBE: SIGNIFICANT CHANGE UP
HPIV2 RNA SPEC QL NAA+PROBE: SIGNIFICANT CHANGE UP
HPIV2 RNA SPEC QL NAA+PROBE: SIGNIFICANT CHANGE UP
HPIV3 RNA SPEC QL NAA+PROBE: SIGNIFICANT CHANGE UP
HPIV3 RNA SPEC QL NAA+PROBE: SIGNIFICANT CHANGE UP
HPIV4 RNA SPEC QL NAA+PROBE: SIGNIFICANT CHANGE UP
HPIV4 RNA SPEC QL NAA+PROBE: SIGNIFICANT CHANGE UP
IANC: 3.77 K/UL — SIGNIFICANT CHANGE UP (ref 1.8–7.4)
IANC: 3.77 K/UL — SIGNIFICANT CHANGE UP (ref 1.8–7.4)
IMM GRANULOCYTES NFR BLD AUTO: 0.3 % — SIGNIFICANT CHANGE UP (ref 0–0.9)
IMM GRANULOCYTES NFR BLD AUTO: 0.3 % — SIGNIFICANT CHANGE UP (ref 0–0.9)
LYMPHOCYTES # BLD AUTO: 1.38 K/UL — SIGNIFICANT CHANGE UP (ref 1–3.3)
LYMPHOCYTES # BLD AUTO: 1.38 K/UL — SIGNIFICANT CHANGE UP (ref 1–3.3)
LYMPHOCYTES # BLD AUTO: 24 % — SIGNIFICANT CHANGE UP (ref 13–44)
LYMPHOCYTES # BLD AUTO: 24 % — SIGNIFICANT CHANGE UP (ref 13–44)
M PNEUMO DNA SPEC QL NAA+PROBE: SIGNIFICANT CHANGE UP
M PNEUMO DNA SPEC QL NAA+PROBE: SIGNIFICANT CHANGE UP
MAGNESIUM SERPL-MCNC: 1.8 MG/DL — SIGNIFICANT CHANGE UP (ref 1.6–2.6)
MAGNESIUM SERPL-MCNC: 1.8 MG/DL — SIGNIFICANT CHANGE UP (ref 1.6–2.6)
MCHC RBC-ENTMCNC: 30.1 PG — SIGNIFICANT CHANGE UP (ref 27–34)
MCHC RBC-ENTMCNC: 30.1 PG — SIGNIFICANT CHANGE UP (ref 27–34)
MCHC RBC-ENTMCNC: 33.3 GM/DL — SIGNIFICANT CHANGE UP (ref 32–36)
MCHC RBC-ENTMCNC: 33.3 GM/DL — SIGNIFICANT CHANGE UP (ref 32–36)
MCV RBC AUTO: 90.4 FL — SIGNIFICANT CHANGE UP (ref 80–100)
MCV RBC AUTO: 90.4 FL — SIGNIFICANT CHANGE UP (ref 80–100)
MONOCYTES # BLD AUTO: 0.5 K/UL — SIGNIFICANT CHANGE UP (ref 0–0.9)
MONOCYTES # BLD AUTO: 0.5 K/UL — SIGNIFICANT CHANGE UP (ref 0–0.9)
MONOCYTES NFR BLD AUTO: 8.7 % — SIGNIFICANT CHANGE UP (ref 2–14)
MONOCYTES NFR BLD AUTO: 8.7 % — SIGNIFICANT CHANGE UP (ref 2–14)
NEUTROPHILS # BLD AUTO: 3.77 K/UL — SIGNIFICANT CHANGE UP (ref 1.8–7.4)
NEUTROPHILS # BLD AUTO: 3.77 K/UL — SIGNIFICANT CHANGE UP (ref 1.8–7.4)
NEUTROPHILS NFR BLD AUTO: 65.8 % — SIGNIFICANT CHANGE UP (ref 43–77)
NEUTROPHILS NFR BLD AUTO: 65.8 % — SIGNIFICANT CHANGE UP (ref 43–77)
NRBC # BLD: 0 /100 WBCS — SIGNIFICANT CHANGE UP (ref 0–0)
NRBC # BLD: 0 /100 WBCS — SIGNIFICANT CHANGE UP (ref 0–0)
NRBC # FLD: 0 K/UL — SIGNIFICANT CHANGE UP (ref 0–0)
NRBC # FLD: 0 K/UL — SIGNIFICANT CHANGE UP (ref 0–0)
PHOSPHATE SERPL-MCNC: 3.4 MG/DL — SIGNIFICANT CHANGE UP (ref 2.5–4.5)
PHOSPHATE SERPL-MCNC: 3.4 MG/DL — SIGNIFICANT CHANGE UP (ref 2.5–4.5)
PLATELET # BLD AUTO: 157 K/UL — SIGNIFICANT CHANGE UP (ref 150–400)
PLATELET # BLD AUTO: 157 K/UL — SIGNIFICANT CHANGE UP (ref 150–400)
POTASSIUM SERPL-MCNC: 3.9 MMOL/L — SIGNIFICANT CHANGE UP (ref 3.5–5.3)
POTASSIUM SERPL-MCNC: 3.9 MMOL/L — SIGNIFICANT CHANGE UP (ref 3.5–5.3)
POTASSIUM SERPL-SCNC: 3.9 MMOL/L — SIGNIFICANT CHANGE UP (ref 3.5–5.3)
POTASSIUM SERPL-SCNC: 3.9 MMOL/L — SIGNIFICANT CHANGE UP (ref 3.5–5.3)
RAPID RVP RESULT: SIGNIFICANT CHANGE UP
RAPID RVP RESULT: SIGNIFICANT CHANGE UP
RBC # BLD: 4.08 M/UL — SIGNIFICANT CHANGE UP (ref 3.8–5.2)
RBC # BLD: 4.08 M/UL — SIGNIFICANT CHANGE UP (ref 3.8–5.2)
RBC # FLD: 13.2 % — SIGNIFICANT CHANGE UP (ref 10.3–14.5)
RBC # FLD: 13.2 % — SIGNIFICANT CHANGE UP (ref 10.3–14.5)
RSV RNA SPEC QL NAA+PROBE: SIGNIFICANT CHANGE UP
RSV RNA SPEC QL NAA+PROBE: SIGNIFICANT CHANGE UP
RV+EV RNA SPEC QL NAA+PROBE: SIGNIFICANT CHANGE UP
RV+EV RNA SPEC QL NAA+PROBE: SIGNIFICANT CHANGE UP
SARS-COV-2 RNA SPEC QL NAA+PROBE: SIGNIFICANT CHANGE UP
SARS-COV-2 RNA SPEC QL NAA+PROBE: SIGNIFICANT CHANGE UP
SODIUM SERPL-SCNC: 143 MMOL/L — SIGNIFICANT CHANGE UP (ref 135–145)
SODIUM SERPL-SCNC: 143 MMOL/L — SIGNIFICANT CHANGE UP (ref 135–145)
WBC # BLD: 5.74 K/UL — SIGNIFICANT CHANGE UP (ref 3.8–10.5)
WBC # BLD: 5.74 K/UL — SIGNIFICANT CHANGE UP (ref 3.8–10.5)
WBC # FLD AUTO: 5.74 K/UL — SIGNIFICANT CHANGE UP (ref 3.8–10.5)
WBC # FLD AUTO: 5.74 K/UL — SIGNIFICANT CHANGE UP (ref 3.8–10.5)

## 2023-12-30 PROCEDURE — 93010 ELECTROCARDIOGRAM REPORT: CPT

## 2023-12-30 RX ADMIN — CEFTRIAXONE 100 MILLIGRAM(S): 500 INJECTION, POWDER, FOR SOLUTION INTRAMUSCULAR; INTRAVENOUS at 18:43

## 2023-12-30 NOTE — PROGRESS NOTE ADULT - ASSESSMENT
84 y/o F with pmh of possible dementia p/w confusion. and found to have UTI. 82 y/o F with pmh of possible dementia p/w confusion. and found to have UTI.

## 2023-12-30 NOTE — CONSULT NOTE ADULT - ASSESSMENT
84 y/o F with pmh of possible dementia p/w confusion.  r/o infection    plan  no fever, no leukocytosis  no hypoxia, no cough  abd exam soft  no diarrhea    check blood cx   ua - bland and with many epithelial cells likely a contaminated specimen    low clinical suspicion of active infection causing symptoms   suspect progression of dementia    can cont ceftriaxone until cx back since pt unable to give gu history  pending cx  to r/u uti as etiology of confusion     aspiration precautions 82 y/o F with pmh of possible dementia p/w confusion.  r/o infection    plan  no fever, no leukocytosis  no hypoxia, no cough  abd exam soft  no diarrhea    check blood cx   ua - bland and with many epithelial cells likely a contaminated specimen    low clinical suspicion of active infection causing symptoms   suspect progression of dementia    can cont ceftriaxone until cx back since pt unable to give gu history  pending cx  to r/u uti as etiology of confusion     aspiration precautions

## 2023-12-30 NOTE — PATIENT PROFILE ADULT - FALL HARM RISK - HARM RISK INTERVENTIONS
Assistance with ambulation/Assistance OOB with selected safe patient handling equipment/Communicate Risk of Fall with Harm to all staff/Reinforce activity limits and safety measures with patient and family/Tailored Fall Risk Interventions/Visual Cue: Yellow wristband and red socks/Bed in lowest position, wheels locked, appropriate side rails in place/Call bell, personal items and telephone in reach/Instruct patient to call for assistance before getting out of bed or chair/Non-slip footwear when patient is out of bed/[place] to call system/Physically safe environment - no spills, clutter or unnecessary equipment/Purposeful Proactive Rounding/Room/bathroom lighting operational, light cord in reach Assistance with ambulation/Assistance OOB with selected safe patient handling equipment/Communicate Risk of Fall with Harm to all staff/Reinforce activity limits and safety measures with patient and family/Tailored Fall Risk Interventions/Visual Cue: Yellow wristband and red socks/Bed in lowest position, wheels locked, appropriate side rails in place/Call bell, personal items and telephone in reach/Instruct patient to call for assistance before getting out of bed or chair/Non-slip footwear when patient is out of bed/[place] to call system/Physically safe environment - no spills, clutter or unnecessary equipment/Purposeful Proactive Rounding/Room/bathroom lighting operational, light cord in reach

## 2023-12-30 NOTE — PATIENT PROFILE ADULT - FUNCTIONAL ASSESSMENT - DAILY ACTIVITY SCORE.
14 36 y/o female, with a PMHx of Left BKA, ESRD on HD (T, Th, Sat), DM, HTN, presented with lethargy, diaphoresis, and emesis. Pt was visiting her daughter in [place] (she is getting heart surgery) when a rapid response was called for her and she was taken to the [facility] ED. Pt had missed several sessions of HD. She was admitted to telemetry for Hyperkalemia (K 6.8 hemolyzed). EKG: NSR @ 67, 1st deg avb, peaked t wave v4-6. Trop: 0.09. CXR - clear lungs. Renal was following patient and she received urgent HD with UF. No evidence of fluid overload, VS wnl, afebrile, not elevated wbc, hgb stable, VBG without significant acid/base abnormality or lactate. Enalapril was discontinued due to hyperkalemia. All sedating medications, oxy and ambien, were discontinued. Psych was called for recurrent episodes of AMS. Pt agrees that her higher level of Lyrica could have caused her to become confused.  Psych recommended holding Lyrica and resuming Oxycodone 5mg every 8 hours. SW to work out transportation issues to and from HD. 38 y/o female, with a PMHx of Left BKA, ESRD on HD (T, Th, Sat), DM, HTN, presented with lethargy, diaphoresis, and emesis. Pt was visiting her daughter in [place] (she is getting heart surgery) when a rapid response was called for her and she was taken to the [facility] ED. Pt had missed several sessions of HD. She was admitted to telemetry for Hyperkalemia (K 6.8 hemolyzed). EKG: NSR @ 67, 1st deg avb, peaked t wave v4-6. Trop: 0.09. CXR - clear lungs. Renal was following patient and she received urgent HD with UF. No evidence of fluid overload, VS wnl, afebrile, not elevated wbc, hgb stable, VBG without significant acid/base abnormality or lactate. Enalapril was discontinued due to hyperkalemia. All sedating medications, oxy and ambien, were discontinued. Psych was called for recurrent episodes of AMS. Pt agrees that her higher level of Lyrica could have caused her to become confused.  Psych recommended holding Lyrica and resuming Oxycodone 5mg every 8 hours. SW to work out transportation issues to and from HD.    On 4/3 am while at HD patient experienced acute sudden left eye vision loss. Emergent optho consult called and IV diamox and eye drops give as per optho. Optho followed with serial tonometry and for eventual transfer to [facility] for glaucoma surgery. Pt stable for transfer 4/4 am as per Dr Palencia      tests    EKG: NSR @ 67, 1st deg avb, peaked t wave v4-6  CE x 1 (Trop: 0.09)             Ca: 7.4  3/29 CXR - clear lungs      consults  3/29 [place] [facility] c/s - 38 y/o female, with left bka, ESRD on HD TTS, DM, HTN, admitted with missed HD and acute SOB found to have hyperkalemia of 6.3. ESRD - pt missed her maintenance HD today and now presents with acute sob and hyperkalemia. Plan for urgent HD with UF bedside once a telemetry bed can be arranged. Hyperkalemia, recommend medical managemetn until HD can be arraged today. monitor serum K.  3/29 Med: 38 y/o Female, with a PmHx of HTN, DM, left BKA, ESRD (on hemodialysis T/Th/S), who was discharged 2 days ago after having RRT called in [facility]'s while visiting daughter found to be lethargic and missed HD, symptoms resolved s/p HD, at the time thought to have been 2/2 opioid use now again sent from [facility]'s s/p RRT for lethargy and vomiting found to have a potassium 6.8 sent for urgent HD. Pt lethargic on exam, arousable aox2-3 (knows the year but cannot stay awake long enough to complete full date), otherwise denies all complaints, recalls she was visiting her daughter but could not explain why she was sent to the hospital. Exam benign, no e/o overload, VS wnl, afeb, not elevated wbc, hgb stable, BMP with e/o having missed HD, VBG without sig acid/base abnormality or lactate. Presentation c/f uremic encephalopathy vs medication side effects vs opioid abuse given lack of e/o for organic cause and repeated admissions with similar clinical presentations. pt would not respond to whether she makes urine, if so, obtain utox, however may be confounded by dialysis. d/c all sedating medications, oxy and ambien. undergoing HD, fu renal recs. given AMS, obtain NCHCT. c/w remainder of home meds. closely monitor VS q4h, monitor on tele. psych c/s. continue to attempt to obtain collateral info  3/30 Psych: Psych consulted for recurrent episodes of AMS.   Pt agrees that her higher level of Lyrica could have caused her to become confused.  She is distressed that she is unable to find her wheel chair and says she really wants to visit her daughter who is in the pediatric ICU.  Pt may be escorted to see her daughter in a wheel chair if this is cleared by her medical and nursing staff.Patient needs further psychiatric safety assessment prior to discharge    Med: Toxic encephalopathy.  Plan: - Now resolved, pt's mental status back to baseline, pt seen brushing teeth and bathing self this morning, fully oriented  - Pt admits to consuming extra doses of Lyrica prior to admission because she ran out of Oxycodone  - Consider pain management consult to assist with weaning off opiates entirely  - Will continue to hold Lyrica   - Pt normally takes Oxycodone 30mg q4 hrs; she is heavily dependent for 3 years now 2/2 chronic back pain. ISTOP reference # [id number]  - Will resume Oxycodone 5mg q8 hours for now, pt in agreement   - No indication for CT head at this point; if acute change in mental status, will revisit CT head  - Psych following; strongly suspect a degree of depression - pt's daughter currently at [facility] ICU s/p open heart surgery due to complications of an infected valve. Hyperkalemia.  Plan: - Hyperkalemia now resolved s/p urgent HD, repeat session this morning, tolerated well  - Pt is scheduled for routine session tomorrow  - Will need SW evaluation re: transportation issues to and from HD   - As per chart review, pt is known to be noncompliant with dietary intake as well, will have dietician meet with patient  - Will d/c Enalapril 2/2 hyperkalemia.   ESRD (end stage renal disease) on dialysis.  Plan: - Appreciate renal input  - Resume phoslo, renal diet   - SW evaluation for transportation needs to and from HD. Hypertension.  Plan: - Suboptimally controlled- D/C Enalapril for hyperkalemia, d/c norvasc and start Procardia and monitor BP.  Diabetes.  Plan: - Carb consistent diet  - HbA1c 7.7- HISS. Need for prophylactic measure. Plan: - heparin 5,000u sc tid  - PT evaluation.  4/1 Med:  Will resume Oxycodone 5mg q8 hours for now, pt in agreement. Will need SW evaluation re: transportation issues to and from HD. PT eval  4/2 pain management- 1. Resume home dosage of Lyrica 100mg BID  2. Make oxycodone 5mg q6hr PRN  3. Offer Lidoderm patchy to lower extremities  4. Get in contact with primary or have patient get in contact with primary to change dose of oxycodone 3omg to 5mg PRN as recommended by pain service.  5. Have patient follow up with neurologist about managing neuropathic pain.  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 Zafar at [facility] tomorrow at 9am 36 y/o female, with a PMHx of Left BKA, ESRD on HD (T, Th, Sat), DM, HTN, presented with lethargy, diaphoresis, and emesis. Pt was visiting her daughter in [place] (she is getting heart surgery) when a rapid response was called for her and she was taken to the [facility] ED. Pt had missed several sessions of HD. She was admitted to telemetry for Hyperkalemia (K 6.8 hemolyzed). EKG: NSR @ 67, 1st deg avb, peaked t wave v4-6. Trop: 0.09. CXR - clear lungs. Renal was following patient and she received urgent HD with UF. No evidence of fluid overload, VS wnl, afebrile, not elevated wbc, hgb stable, VBG without significant acid/base abnormality or lactate. Enalapril was discontinued due to hyperkalemia. All sedating medications, oxy and ambien, were discontinued. Psych was called for recurrent episodes of AMS. Pt agrees that her higher level of Lyrica could have caused her to become confused.  Psych recommended holding Lyrica and resuming Oxycodone 5mg every 8 hours. SW to work out transportation issues to and from HD.    On 4/3 am while at HD patient experienced acute sudden left eye vision loss. Emergent optho consult called and IV diamox and eye drops give as per optho. 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 Zafar (retina) and Dr. Lombardo (glaucoma) - will do surgery at [facility] at 7 am on 4/4    >>Pt stable for transfer 4/4 am as per Dr Palencia      tests    EKG: NSR @ 67, 1st deg avb, peaked t wave v4-6  CE x 1 (Trop: 0.09)             Ca: 7.4  3/29 CXR - clear lungs      consults  3/29 [place] [facility] - 36 y/o female, with left bka, ESRD on HD TTS, DM, HTN, admitted with missed HD and acute SOB found to have hyperkalemia of 6.3. ESRD - pt missed her maintenance HD today and now presents with acute sob and hyperkalemia. Plan for urgent HD with UF bedside once a telemetry bed can be arranged. Hyperkalemia, recommend medical managemetn until HD can be arraged today. monitor serum K.  3/29 Med: 36 y/o Female, with a PmHx of HTN, DM, left BKA, ESRD (on hemodialysis T/Th/S), who was discharged 2 days ago after having RRT called in [facility] while visiting daughter found to be lethargic and missed HD, symptoms resolved s/p HD, at the time thought to have been 2/2 opioid use now again sent from [facility]'s s/p RRT for lethargy and vomiting found to have a potassium 6.8 sent for urgent HD. Pt lethargic on exam, arousable aox2-3 (knows the year but cannot stay awake long enough to complete full date), otherwise denies all complaints, recalls she was visiting her daughter but could not explain why she was sent to the hospital. Exam benign, no e/o overload, VS wnl, afeb, not elevated wbc, hgb stable, BMP with e/o having missed HD, VBG without sig acid/base abnormality or lactate. Presentation c/f uremic encephalopathy vs medication side effects vs opioid abuse given lack of e/o for organic cause and repeated admissions with similar clinical presentations. pt would not respond to whether she makes urine, if so, obtain utox, however may be confounded by dialysis. d/c all sedating medications, oxy and ambien. undergoing HD, fu renal recs. given AMS, obtain NCHCT. c/w remainder of home meds. closely monitor VS q4h, monitor on tele. psych c/s. continue to attempt to obtain collateral info  3/30 Psych: Psych consulted for recurrent episodes of AMS.   Pt agrees that her higher level of Lyrica could have caused her to become confused.  She is distressed that she is unable to find her wheel chair and says she really wants to visit her daughter who is in the pediatric ICU.  Pt may be escorted to see her daughter in a wheel chair if this is cleared by her medical and nursing staff.Patient needs further psychiatric safety assessment prior to discharge    Med: Toxic encephalopathy.  Plan: - Now resolved, pt's mental status back to baseline, pt seen brushing teeth and bathing self this morning, fully oriented  - Pt admits to consuming extra doses of Lyrica prior to admission because she ran out of Oxycodone  - Consider pain management consult to assist with weaning off opiates entirely  - Will continue to hold Lyrica   - Pt normally takes Oxycodone 30mg q4 hrs; she is heavily dependent for 3 years now 2/2 chronic back pain. ISTOP reference # [id number]  - Will resume Oxycodone 5mg q8 hours for now, pt in agreement   - No indication for CT head at this point; if acute change in mental status, will revisit CT head  - Psych following; strongly suspect a degree of depression - pt's daughter currently at [facility] ICU s/p open heart surgery due to complications of an infected valve. Hyperkalemia.  Plan: - Hyperkalemia now resolved s/p urgent HD, repeat session this morning, tolerated well  - Pt is scheduled for routine session tomorrow  - Will need SW evaluation re: transportation issues to and from HD   - As per chart review, pt is known to be noncompliant with dietary intake as well, will have dietician meet with patient  - Will d/c Enalapril 2/2 hyperkalemia.   ESRD (end stage renal disease) on dialysis.  Plan: - Appreciate renal input  - Resume phoslo, renal diet   - SW evaluation for transportation needs to and from HD. Hypertension.  Plan: - Suboptimally controlled- D/C Enalapril for hyperkalemia, d/c norvasc and start Procardia and monitor BP.  Diabetes.  Plan: - Carb consistent diet  - HbA1c 7.7- HISS. Need for prophylactic measure. Plan: - heparin 5,000u sc tid  - PT evaluation.  4/1 Med:  Will resume Oxycodone 5mg q8 hours for now, pt in agreement. Will need SW evaluation re: transportation issues to and from HD. PT eval  4/2 pain management- 1. Resume home dosage of Lyrica 100mg BID  2. Make oxycodone 5mg q6hr PRN  3. Offer Lidoderm patchy to lower extremities  4. Get in contact with primary or have patient get in contact with primary to change dose of oxycodone 3omg to 5mg PRN as recommended by pain service.  5. Have patient follow up with neurologist about managing neuropathic pain.     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 Zafar (retina) and Dr. Lombardo (glaucoma) - will do surgery at [facility] at 7 am on 4/4  - Needs medical clearance, NPO after midnight, transfer to be at [facility] by 6am tomorrow   - brimonidine, latanoprost, timolol and dorzolamide x2 --> IOP down to 61 after gtts   - diamox 500mg IV x1   - c/w brimonidine, timolol and dorzolamide BID and latanoprost QHS   - Diamox sequels 500 mg PO tonight and tomorrow am 38 y/o female, with a PMHx of Left BKA, ESRD on HD (T, Th, Sat), DM, HTN, presented with lethargy, diaphoresis, and emesis. Pt was visiting her daughter in [place] (she is getting heart surgery) when a rapid response was called for her and she was taken to the [facility] ED. Pt had missed several sessions of HD. She was admitted to telemetry for Hyperkalemia (K 6.8 hemolyzed). EKG: NSR @ 67, 1st deg avb, peaked t wave v4-6. Trop: 0.09. CXR - clear lungs. Renal was following patient and she received urgent HD with UF. No evidence of fluid overload, VS wnl, afebrile, not elevated wbc, hgb stable, VBG without significant acid/base abnormality or lactate. Enalapril was discontinued due to hyperkalemia. All sedating medications, oxy and ambien, were discontinued. Psych was called for recurrent episodes of AMS. Pt agrees that her higher level of Lyrica could have caused her to become confused.  Psych recommended holding Lyrica and resuming Oxycodone 5mg every 8 hours. SW to work out transportation issues to and from HD.    On 4/3 am while at HD patient experienced acute sudden left eye vision loss. Emergent optho consult called and IV diamox and eye drops give as per optho. 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 Zafar (retina) and Dr. Lombardo (glaucoma) - will do surgery at [facility] at 7 am on 4/4    >>Pt stable for transfer 4/4 am as per Dr Palencia      tests    EKG: NSR @ 67, 1st deg avb, peaked t wave v4-6  CE x 1 (Trop: 0.09)             Ca: 7.4  3/29 CXR - clear lungs      consults  3/29 [place] [facility] c/s - 38 y/o female, with left bka, ESRD on HD TTS, DM, HTN, admitted with missed HD and acute SOB found to have hyperkalemia of 6.3. ESRD - pt missed her maintenance HD today and now presents with acute sob and hyperkalemia. Plan for urgent HD with UF bedside once a telemetry bed can be arranged. Hyperkalemia, recommend medical managemetn until HD can be arraged today. monitor serum K.  3/29 Med: 38 y/o Female, with a PmHx of HTN, DM, left BKA, ESRD (on hemodialysis T/Th/S), who was discharged 2 days ago after having RRT called in [facility] while visiting daughter found to be lethargic and missed HD, symptoms resolved s/p HD, at the time thought to have been 2/2 opioid use now again sent from [facility]'s s/p RRT for lethargy and vomiting found to have a potassium 6.8 sent for urgent HD. Pt lethargic on exam, arousable aox2-3 (knows the year but cannot stay awake long enough to complete full date), otherwise denies all complaints, recalls she was visiting her daughter but could not explain why she was sent to the hospital. Exam benign, no e/o overload, VS wnl, afeb, not elevated wbc, hgb stable, BMP with e/o having missed HD, VBG without sig acid/base abnormality or lactate. Presentation c/f uremic encephalopathy vs medication side effects vs opioid abuse given lack of e/o for organic cause and repeated admissions with similar clinical presentations. pt would not respond to whether she makes urine, if so, obtain utox, however may be confounded by dialysis. d/c all sedating medications, oxy and ambien. undergoing HD, fu renal recs. given AMS, obtain NCHCT. c/w remainder of home meds. closely monitor VS q4h, monitor on tele. psych c/s. continue to attempt to obtain collateral info  3/30 Psych: Psych consulted for recurrent episodes of AMS.   Pt agrees that her higher level of Lyrica could have caused her to become confused.  She is distressed that she is unable to find her wheel chair and says she really wants to visit her daughter who is in the pediatric ICU.  Pt may be escorted to see her daughter in a wheel chair if this is cleared by her medical and nursing staff.Patient needs further psychiatric safety assessment prior to discharge    Med: Toxic encephalopathy.  Plan: - Now resolved, pt's mental status back to baseline, pt seen brushing teeth and bathing self this morning, fully oriented  - Pt admits to consuming extra doses of Lyrica prior to admission because she ran out of Oxycodone  - Consider pain management consult to assist with weaning off opiates entirely  - Will continue to hold Lyrica   - Pt normally takes Oxycodone 30mg q4 hrs; she is heavily dependent for 3 years now 2/2 chronic back pain. ISTOP reference # [id number]  - Will resume Oxycodone 5mg q8 hours for now, pt in agreement   - No indication for CT head at this point; if acute change in mental status, will revisit CT head  - Psych following; strongly suspect a degree of depression - pt's daughter currently at [facility] ICU s/p open heart surgery due to complications of an infected valve. Hyperkalemia.  Plan: - Hyperkalemia now resolved s/p urgent HD, repeat session this morning, tolerated well  - Pt is scheduled for routine session tomorrow  - Will need SW evaluation re: transportation issues to and from HD   - As per chart review, pt is known to be noncompliant with dietary intake as well, will have dietician meet with patient  - Will d/c Enalapril 2/2 hyperkalemia.   ESRD (end stage renal disease) on dialysis.  Plan: - Appreciate renal input  - Resume phoslo, renal diet   - SW evaluation for transportation needs to and from HD. Hypertension.  Plan: - Suboptimally controlled- D/C Enalapril for hyperkalemia, d/c norvasc and start Procardia and monitor BP.  Diabetes.  Plan: - Carb consistent diet  - HbA1c 7.7- HISS. Need for prophylactic measure. Plan: - heparin 5,000u sc tid  - PT evaluation.  4/1 Med:  Will resume Oxycodone 5mg q8 hours for now, pt in agreement. Will need SW evaluation re: transportation issues to and from HD. PT eval  4/2 pain management- 1. Resume home dosage of Lyrica 100mg BID  2. Make oxycodone 5mg q6hr PRN  3. Offer Lidoderm patchy to lower extremities  4. Get in contact with primary or have patient get in contact with primary to change dose of oxycodone 3omg to 5mg PRN as recommended by pain service.  5. Have patient follow up with neurologist about managing neuropathic pain.   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 Zafar (retina) and Dr. Lombardo (glaucoma) - will do surgery at [facility] at 7 am on 4/4  - Needs medical clearance, NPO after midnight, transfer to be at [facility] by 6am tomorrow   - brimonidine, latanoprost, timolol and dorzolamide x2 --> IOP down to 61 after gtts   - diamox 500mg IV x1   - c/w brimonidine, timolol and dorzolamide BID and latanoprost QHS   - Diamox sequels 500 mg PO tonight and tomorrow am, changed to 250mg BID  Kayexelate added for persistent Hyperkalemia despite HD and dietary restrictions 38 y/o female, with a PMHx of Left BKA, ESRD on HD (T, Th, Sat), DM, HTN, presented with lethargy, diaphoresis, and emesis. Pt was visiting her daughter in [place] (she is getting heart surgery) when a rapid response was called for her and she was taken [facility] ED. Pt had missed several sessions of HD. She was admitted to telemetry for Hyperkalemia (K 6.8 hemolyzed). EKG: NSR @ 67, 1st deg avb, peaked t wave v4-6. Trop: 0.09. CXR - clear lungs. Renal was following patient and she received urgent HD with UF. No evidence of fluid overload, VS wnl, afebrile, not elevated wbc, hgb stable, VBG without significant acid/base abnormality or lactate. Enalapril was discontinued due to hyperkalemia. All sedating medications, oxy and ambien, were discontinued. Psych was called for recurrent episodes of AMS. Pt agrees that her higher level of Lyrica could have caused her to become confused.  Psych recommended holding Lyrica and resuming Oxycodone 5mg every 8 hours. SW to work out transportation issues to and from HD.    On 4/3 am while at HD patient experienced acute sudden left eye vision loss. Emergent optho consult called and IV diamox and eye drops give as per optho. 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 Zafar (retina) and Dr. Lombardo (glaucoma) - will do surgery at [facility] at 7 am on 4/4    >>Pt stable for transfer 4/4 am as per Dr Palencia      tests    EKG: NSR @ 67, 1st deg avb, peaked t wave v4-6  CE x 1 (Trop: 0.09)             Ca: 7.4  3/29 CXR - clear lungs      consults  3/29 [place] [facility] c/s - 38 y/o female, with left bka, ESRD on HD TTS, DM, HTN, admitted with missed HD and acute SOB found to have hyperkalemia of 6.3. ESRD - pt missed her maintenance HD today and now presents with acute sob and hyperkalemia. Plan for urgent HD with UF bedside once a telemetry bed can be arranged. Hyperkalemia, recommend medical managemetn until HD can be arraged today. monitor serum K.  3/29 Med: 38 y/o Female, with a PmHx of HTN, DM, left BKA, ESRD (on hemodialysis T/Th/S), who was discharged 2 days ago after having RRT called in [facility] while visiting daughter found to be lethargic and missed HD, symptoms resolved s/p HD, at the time thought to have been 2/2 opioid use now again sent from [facility]'s s/p RRT for lethargy and vomiting found to have a potassium 6.8 sent for urgent HD. Pt lethargic on exam, arousable aox2-3 (knows the year but cannot stay awake long enough to complete full date), otherwise denies all complaints, recalls she was visiting her daughter but could not explain why she was sent to the hospital. Exam benign, no e/o overload, VS wnl, afeb, not elevated wbc, hgb stable, BMP with e/o having missed HD, VBG without sig acid/base abnormality or lactate. Presentation c/f uremic encephalopathy vs medication side effects vs opioid abuse given lack of e/o for organic cause and repeated admissions with similar clinical presentations. pt would not respond to whether she makes urine, if so, obtain utox, however may be confounded by dialysis. d/c all sedating medications, oxy and ambien. undergoing HD, fu renal recs. given AMS, obtain NCHCT. c/w remainder of home meds. closely monitor VS q4h, monitor on tele. psych c/s. continue to attempt to obtain collateral info  3/30 Psych: Psych consulted for recurrent episodes of AMS.   Pt agrees that her higher level of Lyrica could have caused her to become confused.  She is distressed that she is unable to find her wheel chair and says she really wants to visit her daughter who is in the pediatric ICU.  Pt may be escorted to see her daughter in a wheel chair if this is cleared by her medical and nursing staff.Patient needs further psychiatric safety assessment prior to discharge    Med: Toxic encephalopathy.  Plan: - Now resolved, pt's mental status back to baseline, pt seen brushing teeth and bathing self this morning, fully oriented  - Pt admits to consuming extra doses of Lyrica prior to admission because she ran out of Oxycodone  - Consider pain management consult to assist with weaning off opiates entirely  - Will continue to hold Lyrica   - Pt normally takes Oxycodone 30mg q4 hrs; she is heavily dependent for 3 years now 2/2 chronic back pain. ISTOP reference # [id number]  - Will resume Oxycodone 5mg q8 hours for now, pt in agreement   - No indication for CT head at this point; if acute change in mental status, will revisit CT head  - Psych following; strongly suspect a degree of depression - pt's daughter currently at [facility] ICU s/p open heart surgery due to complications of an infected valve. Hyperkalemia.  Plan: - Hyperkalemia now resolved s/p urgent HD, repeat session this morning, tolerated well  - Pt is scheduled for routine session tomorrow  - Will need SW evaluation re: transportation issues to and from HD   - As per chart review, pt is known to be noncompliant with dietary intake as well, will have dietician meet with patient  - Will d/c Enalapril 2/2 hyperkalemia.   ESRD (end stage renal disease) on dialysis.  Plan: - Appreciate renal input  - Resume phoslo, renal diet   - SW evaluation for transportation needs to and from HD. Hypertension.  Plan: - Suboptimally controlled- D/C Enalapril for hyperkalemia, d/c norvasc and start Procardia and monitor BP.  Diabetes.  Plan: - Carb consistent diet  - HbA1c 7.7- HISS. Need for prophylactic measure. Plan: - heparin 5,000u sc tid  - PT evaluation.  4/1 Med:  Will resume Oxycodone 5mg q8 hours for now, pt in agreement. Will need SW evaluation re: transportation issues to and from HD. PT eval  4/2 pain management- 1. Resume home dosage of Lyrica 100mg BID  2. Make oxycodone 5mg q6hr PRN  3. Offer Lidoderm patchy to lower extremities  4. Get in contact with primary or have patient get in contact with primary to change dose of oxycodone 3omg to 5mg PRN as recommended by pain service.  5. Have patient follow up with neurologist about managing neuropathic pain.   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 Zafar (retina) and Dr. Lombardo (glaucoma) - will do surgery at [facility] at 7 am on 4/4  - Needs medical clearance, NPO after midnight, transfer to be at [facility] by 6am tomorrow   - brimonidine, latanoprost, timolol and dorzolamide x2 --> IOP down to 61 after gtts   - diamox 500mg IV x1   - c/w brimonidine, timolol and dorzolamide BID and latanoprost QHS   - Diamox sequels 500 mg PO tonight and tomorrow am, changed to 250mg BID  Kayexelate added for persistent Hyperkalemia despite HD and dietary restrictions           Dr. Zafar [facility] after discharge, appointment made for 4/16/18 at 1:30 38 y/o female, with a PMHx of Left BKA, ESRD on HD (T, Th, Sat), DM, HTN, presented with lethargy, diaphoresis, and emesis. Pt was visiting her daughter in [place] (she is getting heart surgery) when a rapid response was called for her and she was taken to the [facility] ED. Pt had missed several sessions of HD. She was admitted to telemetry for Hyperkalemia (K 6.8 hemolyzed). EKG: NSR @ 67, 1st deg avb, peaked t wave v4-6. Trop: 0.09. CXR - clear lungs. Renal was following patient and she received urgent HD with [facility]. No evidence of fluid overload, VS wnl, afebrile, not elevated wbc, hgb stable, VBG without significant acid/base abnormality or lactate. Enalapril was discontinued due to hyperkalemia. All sedating medications, oxy and ambien, were discontinued. Psych was called for recurrent episodes of AMS. Pt agrees that her higher level of Lyrica could have caused her to become confused.  Psych recommended holding Lyrica and resuming Oxycodone 5mg every 8 hours. SW to work out transportation issues to and from HD.    On 4/3 am while at HD patient experienced acute sudden left eye vision loss. Emergent optho consult called and IV diamox and eye drops give as per optho. 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 Zafar (retina) and Dr. Lombardo (glaucoma) -  surgery at [facility] at 7 am on 4/4 was planned however unable to have surgery due to hyperkalemia needing urgent dialysis, sx cancelled, per ophthalmology continued on diamox and eye drops, per them no urgent need for sx, pt was advised to f/u as outpt for sx in future.        tests    EKG: NSR @ 67, 1st deg avb, peaked t wave v4-6  CE x 1 (Trop: 0.09)             Ca: 7.4  3/29 CXR - clear lungs      consults  3/29 [facility] - 38 y/o female, with left bka, ESRD on HD TTS, DM, HTN, admitted with missed HD and acute SOB found to have hyperkalemia of 6.3. ESRD - pt missed her maintenance HD today and now presents with acute sob and hyperkalemia. Plan for urgent HD with [facility] bedside once a telemetry bed can be arranged. Hyperkalemia, recommend medical managemetn until HD can be arraged today. monitor serum K.  3/29 Med: 38 y/o Female, with a PmHx of HTN, DM, left BKA, ESRD (on hemodialysis T/Th/S), who was discharged 2 days ago after having RRT called in [facility] while visiting daughter found to be lethargic and missed HD, symptoms resolved s/p HD, at the time thought to have been 2/2 opioid use now again sent from [facility]'s s/p RRT for lethargy and vomiting found to have a potassium 6.8 sent for urgent HD. Pt lethargic on exam, arousable aox2-3 (knows the year but cannot stay awake long enough to complete full date), otherwise denies all complaints, recalls she was visiting her daughter but could not explain why she was sent to the hospital. Exam benign, no e/o overload, VS wnl, afeb, not elevated wbc, hgb stable, BMP with e/o having missed HD, VBG without sig acid/base abnormality or lactate. Presentation c/f uremic encephalopathy vs medication side effects vs opioid abuse given lack of e/o for organic cause and repeated admissions with similar clinical presentations. pt would not respond to whether she makes urine, if so, obtain utox, however may be confounded by dialysis. d/c all sedating medications, oxy and ambien. undergoing HD, fu renal recs. given AMS, obtain NCHCT. c/w remainder of home meds. closely monitor VS q4h, monitor on tele. psych c/s. continue to attempt to obtain collateral info  3/30 Psych: Psych consulted for recurrent episodes of AMS.   Pt agrees that her higher level of Lyrica could have caused her to become confused.  She is distressed that she is unable to find her wheel chair and says she really wants to visit her daughter who is in the pediatric ICU.  Pt may be escorted to see her daughter in a wheel chair if this is cleared by her medical and nursing staff.Patient needs further psychiatric safety assessment prior to discharge    Med: Toxic encephalopathy.  Plan: - Now resolved, pt's mental status back to baseline, pt seen brushing teeth and bathing self this morning, fully oriented  - Pt admits to consuming extra doses of Lyrica prior to admission because she ran out of Oxycodone  - Lyrica eventually started back per pain management recs  - Pt normally takes Oxycodone 30mg q4 hrs; she is heavily dependent for 3 years now 2/2 chronic back pain. ISTOP reference # [id number]  - Will resume Oxycodone 5mg q8 hours for now, pt in agreement   - No indication for CT head at this point; if acute change in mental status, will revisit CT head  - Psych following; strongly suspect a degree of depression - pt's daughter currently at [facility] ICU s/p open heart surgery due to complications of an infected valve. Hyperkalemia.  Plan: - Hyperkalemia now resolved s/p urgent HD, repeat session this morning, tolerated well  - Pt is scheduled for routine session tomorrow  - Will need SW evaluation re: transportation issues to and from HD   - As per chart review, pt is known to be noncompliant with dietary intake as well, will have dietician meet with patient  - Will d/c Enalapril 2/2 hyperkalemia.   ESRD (end stage renal disease) on dialysis.  Plan: - Appreciate renal input  - Resume phoslo, renal diet   - SW evaluation for transportation needs to and from HD. Hypertension.  Plan: - Suboptimally controlled- D/C Enalapril for hyperkalemia, d/c norvasc and start Procardia and monitor BP.  Diabetes.  Plan: - Carb consistent diet  - HbA1c 7.7- HISS. Need for prophylactic measure. Plan: - heparin 5,000u sc tid  - PT evaluation.  4/1 Med:  Will resume Oxycodone 5mg q8 hours for now, pt in agreement. Will need SW evaluation re: transportation issues to and from HD. PT eval  4/2 pain management- 1. Resume home dosage of Lyrica 100mg BID  2. Make oxycodone 5mg q6hr PRN  3. Offer Lidoderm patchy to lower extremities  4. Get in contact with primary or have patient get in contact with primary to change dose of oxycodone 3omg to 5mg PRN as recommended by pain service.  5. Have patient follow up with neurologist about managing neuropathic pain.   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 Zafar (retina) and Dr. Lombardo (glaucoma) - will do surgery at [facility] at 7 am on 4/4  - brimonidine, latanoprost, timolol and dorzolamide x2   - diamox 500mg IV x1   - c/w brimonidine, timolol and dorzolamide BID and latanoprost QHS   Kayexelate added for persistent Hyperkalemia despite HD and dietary restrictions           Dr. Zafar [facility] after discharge, appointment made for 4/16/18 at 1:30  Pt is stable for d/c today to rehab, advised outpt f/u

## 2023-12-30 NOTE — PROGRESS NOTE ADULT - SUBJECTIVE AND OBJECTIVE BOX
Patient is a 83y old  Female who presents with a chief complaint of confusion (30 Dec 2023 14:21)      SUBJECTIVE / OVERNIGHT EVENTS:    Events noted.  CONSTITUTIONAL: No fever,  or fatigue  RESPIRATORY: No cough, wheezing,  No shortness of breath  CARDIOVASCULAR: No chest pain, palpitations, dizziness, or leg swelling  GASTROINTESTINAL: No abdominal or epigastric pain. No nausea, vomiting.  NEUROLOGICAL: No headache    MEDICATIONS  (STANDING):  cefTRIAXone   IVPB 1000 milliGRAM(s) IV Intermittent every 24 hours    MEDICATIONS  (PRN):  acetaminophen     Tablet .. 650 milliGRAM(s) Oral every 6 hours PRN Temp greater or equal to 38C (100.4F), Mild Pain (1 - 3)        CAPILLARY BLOOD GLUCOSE        I&O's Summary      T(C): 36.3 (12-30-23 @ 21:55), Max: 36.6 (12-30-23 @ 15:18)  HR: 55 (12-30-23 @ 21:55) (55 - 71)  BP: 140/56 (12-30-23 @ 21:55) (104/59 - 140/56)  RR: 16 (12-30-23 @ 21:55) (16 - 18)  SpO2: 100% (12-30-23 @ 21:55) (94% - 100%)    PHYSICAL EXAM:  GENERAL: NAD  NECK: Supple, No JVD  CHEST/LUNG: Clear to auscultation bilaterally; No wheezing.  HEART: Regular rate and rhythm; No murmurs, rubs, or gallops  ABDOMEN: Soft, Nontender, Nondistended; Bowel sounds present  EXTREMITIES:   No edema  NEUROLOGY: AAO X 3      LABS:                        12.3   5.74  )-----------( 157      ( 30 Dec 2023 07:20 )             36.9     12-30    143  |  107  |  15  ----------------------------<  82  3.9   |  24  |  0.74    Ca    8.7      30 Dec 2023 07:20  Phos  3.4     12-30  Mg     1.80     12-30    TPro  6.4  /  Alb  3.6  /  TBili  0.4  /  DBili  x   /  AST  20  /  ALT  8   /  AlkPhos  41  12-29          Urinalysis Basic - ( 30 Dec 2023 07:20 )    Color: x / Appearance: x / SG: x / pH: x  Gluc: 82 mg/dL / Ketone: x  / Bili: x / Urobili: x   Blood: x / Protein: x / Nitrite: x   Leuk Esterase: x / RBC: x / WBC x   Sq Epi: x / Non Sq Epi: x / Bacteria: x      CAPILLARY BLOOD GLUCOSE            RADIOLOGY & ADDITIONAL TESTS:    Imaging Personally Reviewed:    Consultant(s) Notes Reviewed:      Care Discussed with Consultants/Other Providers:    Steven Darling MD, CMD, FACP    [address] [place] [address]  Office Tel: [phone]  Cell: [phone]   Patient is a 83y old  Female who presents with a chief complaint of confusion (30 Dec 2023 14:21)      SUBJECTIVE / OVERNIGHT EVENTS:    Events noted.  CONSTITUTIONAL: No fever,  or fatigue  RESPIRATORY: No cough, wheezing,  No shortness of breath  CARDIOVASCULAR: No chest pain, palpitations, dizziness, or leg swelling  GASTROINTESTINAL: No abdominal or epigastric pain. No nausea, vomiting.  NEUROLOGICAL: No headache    MEDICATIONS  (STANDING):  cefTRIAXone   IVPB 1000 milliGRAM(s) IV Intermittent every 24 hours    MEDICATIONS  (PRN):  acetaminophen     Tablet .. 650 milliGRAM(s) Oral every 6 hours PRN Temp greater or equal to 38C (100.4F), Mild Pain (1 - 3)        CAPILLARY BLOOD GLUCOSE        I&O's Summary      T(C): 36.3 (12-30-23 @ 21:55), Max: 36.6 (12-30-23 @ 15:18)  HR: 55 (12-30-23 @ 21:55) (55 - 71)  BP: 140/56 (12-30-23 @ 21:55) (104/59 - 140/56)  RR: 16 (12-30-23 @ 21:55) (16 - 18)  SpO2: 100% (12-30-23 @ 21:55) (94% - 100%)    PHYSICAL EXAM:  GENERAL: NAD  NECK: Supple, No JVD  CHEST/LUNG: Clear to auscultation bilaterally; No wheezing.  HEART: Regular rate and rhythm; No murmurs, rubs, or gallops  ABDOMEN: Soft, Nontender, Nondistended; Bowel sounds present  EXTREMITIES:   No edema  NEUROLOGY: AAO X 3      LABS:                        12.3   5.74  )-----------( 157      ( 30 Dec 2023 07:20 )             36.9     12-30    143  |  107  |  15  ----------------------------<  82  3.9   |  24  |  0.74    Ca    8.7      30 Dec 2023 07:20  Phos  3.4     12-30  Mg     1.80     12-30    TPro  6.4  /  Alb  3.6  /  TBili  0.4  /  DBili  x   /  AST  20  /  ALT  8   /  AlkPhos  41  12-29          Urinalysis Basic - ( 30 Dec 2023 07:20 )    Color: x / Appearance: x / SG: x / pH: x  Gluc: 82 mg/dL / Ketone: x  / Bili: x / Urobili: x   Blood: x / Protein: x / Nitrite: x   Leuk Esterase: x / RBC: x / WBC x   Sq Epi: x / Non Sq Epi: x / Bacteria: x      CAPILLARY BLOOD GLUCOSE            RADIOLOGY & ADDITIONAL TESTS:    Imaging Personally Reviewed:    Consultant(s) Notes Reviewed:      Care Discussed with Consultants/Other Providers:    Steven Darling MD, CMD, FACP    [address] [place] [address]  Office Tel: [phone]  Cell: [phone]

## 2023-12-30 NOTE — CONSULT NOTE ADULT - SUBJECTIVE AND OBJECTIVE BOX
[facility], Division of Infectious Diseases  KENDRICK Rai S. Shah, Y. Patel, G. [facility]   [phone]  after hours and weekends [phone]    MONE LAU  83y, Female  [id number]    HPI: pt nonarousable agitated unable to give history per chart  84 y/o F with pmh of possible dementia p/w confusion.  Pt was found by bystander (who is a nurse) wandering by a local grocery store and appeared confused.  Bystander brought pt to her home and noted pt's home was in a poor state, and did not seem safe for pt to live alone.  Pt brought to ED by EMS.  Pt currently confused stating "they are keeping me here against my will" but does not remember where she is or the circumstances that brought her here.        PMH/PSH--  Gallstones  Hx of tonsillectomy        Allergies--erythromycin       Medications--  Antibiotics: cefTRIAXone   IVPB 1000 milliGRAM(s) IV Intermittent every 24 hours    Immunologic:   Other: acetaminophen     Tablet .. PRN      Social History--  unable to obtain     Family/Marital History--  No pertinent family history in first degree relatives          Travel/Environmental/Occupational History:  nc     Review of Systems:  REVIEW OF SYSTEMS  unablt to obtain     Physical Exam--  Vital Signs: T(F): 97.4 (12-30-23 @ 08:26), Max: 98.4 (12-29-23 @ 14:46)  HR: 71 (12-30-23 @ 08:26)  BP: 105/57 (12-30-23 @ 08:26)  RR: 18 (12-30-23 @ 08:26)  SpO2: 98% (12-30-23 @ 08:26)  Wt(kg): --  General:confused agitated eyes closed   HEENT: AT/NC.  Neck: Not rigid. No sense of mass.  Nodes: None palpable.  Lungs: noncompliant with deep breath  Heart: Regular rate and rhythm.  Abdomen: Bowel sounds present and normoactive. Soft. Nondistended.   Extremities: No cyanosis or clubbing.trace edema.   Skin: Warm. Dry. Good turgor. No rash. No vasculitic stigmata.  Psychiatric: confused         Laboratory & Imaging Data--  CBC                        12.3   5.74  )-----------( 157      ( 30 Dec 2023 07:20 )             36.9       Chemistries  12-30    143  |  107  |  15  ----------------------------<  82  3.9   |  24  |  0.74    Ca    8.7      30 Dec 2023 07:20  Phos  3.4     12-30  Mg     1.80     12-30    TPro  6.4  /  Alb  3.6  /  TBili  0.4  /  DBili  x   /  AST  20  /  ALT  8   /  AlkPhos  41  12-29      Culture Data        Urine Microscopic-Add On (NC) (12.29.23 @ 14:41)   Red Blood Cell - Urine: 12 /HPF  White Blood Cell - Urine: 19 /HPF  Hyaline Casts: Present  Calcium Oxalate Crystals: Present  Bacteria: Few /HPF  Epithelial Cells: 12 /HPF  Review: Reviewed  Cast: 5 /LPF   [facility], Division of Infectious Diseases  KENDRICK Rai S. Shah, Y. Patel, G. [facility]   [phone]  after hours and weekends [phone]    MONE LAU  83y, Female  [id number]    HPI: pt nonarousable agitated unable to give history per chart  82 y/o F with pmh of possible dementia p/w confusion.  Pt was found by bystander (who is a nurse) wandering by a local grocery store and appeared confused.  Bystander brought pt to her home and noted pt's home was in a poor state, and did not seem safe for pt to live alone.  Pt brought to ED by EMS.  Pt currently confused stating "they are keeping me here against my will" but does not remember where she is or the circumstances that brought her here.        PMH/PSH--  Gallstones  Hx of tonsillectomy        Allergies--erythromycin       Medications--  Antibiotics: cefTRIAXone   IVPB 1000 milliGRAM(s) IV Intermittent every 24 hours    Immunologic:   Other: acetaminophen     Tablet .. PRN      Social History--  unable to obtain     Family/Marital History--  No pertinent family history in first degree relatives          Travel/Environmental/Occupational History:  nc     Review of Systems:  REVIEW OF SYSTEMS  unablt to obtain     Physical Exam--  Vital Signs: T(F): 97.4 (12-30-23 @ 08:26), Max: 98.4 (12-29-23 @ 14:46)  HR: 71 (12-30-23 @ 08:26)  BP: 105/57 (12-30-23 @ 08:26)  RR: 18 (12-30-23 @ 08:26)  SpO2: 98% (12-30-23 @ 08:26)  Wt(kg): --  General:confused agitated eyes closed   HEENT: AT/NC.  Neck: Not rigid. No sense of mass.  Nodes: None palpable.  Lungs: noncompliant with deep breath  Heart: Regular rate and rhythm.  Abdomen: Bowel sounds present and normoactive. Soft. Nondistended.   Extremities: No cyanosis or clubbing.trace edema.   Skin: Warm. Dry. Good turgor. No rash. No vasculitic stigmata.  Psychiatric: confused         Laboratory & Imaging Data--  CBC                        12.3   5.74  )-----------( 157      ( 30 Dec 2023 07:20 )             36.9       Chemistries  12-30    143  |  107  |  15  ----------------------------<  82  3.9   |  24  |  0.74    Ca    8.7      30 Dec 2023 07:20  Phos  3.4     12-30  Mg     1.80     12-30    TPro  6.4  /  Alb  3.6  /  TBili  0.4  /  DBili  x   /  AST  20  /  ALT  8   /  AlkPhos  41  12-29      Culture Data        Urine Microscopic-Add On (NC) (12.29.23 @ 14:41)   Red Blood Cell - Urine: 12 /HPF  White Blood Cell - Urine: 19 /HPF  Hyaline Casts: Present  Calcium Oxalate Crystals: Present  Bacteria: Few /HPF  Epithelial Cells: 12 /HPF  Review: Reviewed  Cast: 5 /LPF

## 2023-12-31 LAB
ANION GAP SERPL CALC-SCNC: 14 MMOL/L — SIGNIFICANT CHANGE UP (ref 7–14)
ANION GAP SERPL CALC-SCNC: 14 MMOL/L — SIGNIFICANT CHANGE UP (ref 7–14)
BUN SERPL-MCNC: 13 MG/DL — SIGNIFICANT CHANGE UP (ref 7–23)
BUN SERPL-MCNC: 13 MG/DL — SIGNIFICANT CHANGE UP (ref 7–23)
CALCIUM SERPL-MCNC: 9.4 MG/DL — SIGNIFICANT CHANGE UP (ref 8.4–10.5)
CALCIUM SERPL-MCNC: 9.4 MG/DL — SIGNIFICANT CHANGE UP (ref 8.4–10.5)
CHLORIDE SERPL-SCNC: 103 MMOL/L — SIGNIFICANT CHANGE UP (ref 98–107)
CHLORIDE SERPL-SCNC: 103 MMOL/L — SIGNIFICANT CHANGE UP (ref 98–107)
CO2 SERPL-SCNC: 28 MMOL/L — SIGNIFICANT CHANGE UP (ref 22–31)
CO2 SERPL-SCNC: 28 MMOL/L — SIGNIFICANT CHANGE UP (ref 22–31)
CREAT SERPL-MCNC: 0.82 MG/DL — SIGNIFICANT CHANGE UP (ref 0.5–1.3)
CREAT SERPL-MCNC: 0.82 MG/DL — SIGNIFICANT CHANGE UP (ref 0.5–1.3)
CULTURE RESULTS: SIGNIFICANT CHANGE UP
CULTURE RESULTS: SIGNIFICANT CHANGE UP
EGFR: 71 ML/MIN/1.73M2 — SIGNIFICANT CHANGE UP
EGFR: 71 ML/MIN/1.73M2 — SIGNIFICANT CHANGE UP
GLUCOSE SERPL-MCNC: 170 MG/DL — HIGH (ref 70–99)
GLUCOSE SERPL-MCNC: 170 MG/DL — HIGH (ref 70–99)
HCT VFR BLD CALC: 44.3 % — SIGNIFICANT CHANGE UP (ref 34.5–45)
HCT VFR BLD CALC: 44.3 % — SIGNIFICANT CHANGE UP (ref 34.5–45)
HGB BLD-MCNC: 14.9 G/DL — SIGNIFICANT CHANGE UP (ref 11.5–15.5)
HGB BLD-MCNC: 14.9 G/DL — SIGNIFICANT CHANGE UP (ref 11.5–15.5)
MAGNESIUM SERPL-MCNC: 1.8 MG/DL — SIGNIFICANT CHANGE UP (ref 1.6–2.6)
MAGNESIUM SERPL-MCNC: 1.8 MG/DL — SIGNIFICANT CHANGE UP (ref 1.6–2.6)
MCHC RBC-ENTMCNC: 30.2 PG — SIGNIFICANT CHANGE UP (ref 27–34)
MCHC RBC-ENTMCNC: 30.2 PG — SIGNIFICANT CHANGE UP (ref 27–34)
MCHC RBC-ENTMCNC: 33.6 GM/DL — SIGNIFICANT CHANGE UP (ref 32–36)
MCHC RBC-ENTMCNC: 33.6 GM/DL — SIGNIFICANT CHANGE UP (ref 32–36)
MCV RBC AUTO: 89.9 FL — SIGNIFICANT CHANGE UP (ref 80–100)
MCV RBC AUTO: 89.9 FL — SIGNIFICANT CHANGE UP (ref 80–100)
NRBC # BLD: 0 /100 WBCS — SIGNIFICANT CHANGE UP (ref 0–0)
NRBC # BLD: 0 /100 WBCS — SIGNIFICANT CHANGE UP (ref 0–0)
NRBC # FLD: 0 K/UL — SIGNIFICANT CHANGE UP (ref 0–0)
NRBC # FLD: 0 K/UL — SIGNIFICANT CHANGE UP (ref 0–0)
PHOSPHATE SERPL-MCNC: 3.1 MG/DL — SIGNIFICANT CHANGE UP (ref 2.5–4.5)
PHOSPHATE SERPL-MCNC: 3.1 MG/DL — SIGNIFICANT CHANGE UP (ref 2.5–4.5)
PLATELET # BLD AUTO: 242 K/UL — SIGNIFICANT CHANGE UP (ref 150–400)
PLATELET # BLD AUTO: 242 K/UL — SIGNIFICANT CHANGE UP (ref 150–400)
POTASSIUM SERPL-MCNC: 3.8 MMOL/L — SIGNIFICANT CHANGE UP (ref 3.5–5.3)
POTASSIUM SERPL-MCNC: 3.8 MMOL/L — SIGNIFICANT CHANGE UP (ref 3.5–5.3)
POTASSIUM SERPL-SCNC: 3.8 MMOL/L — SIGNIFICANT CHANGE UP (ref 3.5–5.3)
POTASSIUM SERPL-SCNC: 3.8 MMOL/L — SIGNIFICANT CHANGE UP (ref 3.5–5.3)
RBC # BLD: 4.93 M/UL — SIGNIFICANT CHANGE UP (ref 3.8–5.2)
RBC # BLD: 4.93 M/UL — SIGNIFICANT CHANGE UP (ref 3.8–5.2)
RBC # FLD: 13.1 % — SIGNIFICANT CHANGE UP (ref 10.3–14.5)
RBC # FLD: 13.1 % — SIGNIFICANT CHANGE UP (ref 10.3–14.5)
SODIUM SERPL-SCNC: 145 MMOL/L — SIGNIFICANT CHANGE UP (ref 135–145)
SODIUM SERPL-SCNC: 145 MMOL/L — SIGNIFICANT CHANGE UP (ref 135–145)
SPECIMEN SOURCE: SIGNIFICANT CHANGE UP
SPECIMEN SOURCE: SIGNIFICANT CHANGE UP
WBC # BLD: 11 K/UL — HIGH (ref 3.8–10.5)
WBC # BLD: 11 K/UL — HIGH (ref 3.8–10.5)
WBC # FLD AUTO: 11 K/UL — HIGH (ref 3.8–10.5)
WBC # FLD AUTO: 11 K/UL — HIGH (ref 3.8–10.5)

## 2023-12-31 RX ORDER — HALOPERIDOL DECANOATE 100 MG/ML
2.5 INJECTION INTRAMUSCULAR ONCE
Refills: 0 | Status: COMPLETED | OUTPATIENT
Start: 2023-12-31 | End: 2023-12-31

## 2023-12-31 RX ADMIN — Medication 0.5 MILLIGRAM(S): at 22:46

## 2023-12-31 RX ADMIN — CEFTRIAXONE 100 MILLIGRAM(S): 500 INJECTION, POWDER, FOR SOLUTION INTRAMUSCULAR; INTRAVENOUS at 16:06

## 2023-12-31 RX ADMIN — HALOPERIDOL DECANOATE 2.5 MILLIGRAM(S): 100 INJECTION INTRAMUSCULAR at 22:47

## 2023-12-31 NOTE — DIETITIAN INITIAL EVALUATION ADULT - PERTINENT MEDS FT
MEDICATIONS  (STANDING):  cefTRIAXone   IVPB 1000 milliGRAM(s) IV Intermittent every 24 hours    MEDICATIONS  (PRN):  acetaminophen     Tablet .. 650 milliGRAM(s) Oral every 6 hours PRN Temp greater or equal to 38C (100.4F), Mild Pain (1 - 3)

## 2023-12-31 NOTE — PROGRESS NOTE ADULT - SUBJECTIVE AND OBJECTIVE BOX
Patient is a 83y old  Female who presents with a chief complaint of confusion   12/31/23      SUBJECTIVE / OVERNIGHT EVENTS:pt seen and examined    MEDICATIONS  (STANDING):  cefTRIAXone   IVPB 1000 milliGRAM(s) IV Intermittent every 24 hours    MEDICATIONS  (PRN):  acetaminophen     Tablet .. 650 milliGRAM(s) Oral every 6 hours PRN Temp greater or equal to 38C (100.4F), Mild Pain (1 - 3)    Vital Signs Last 24 Hrs  T(C): 36.8 (12-31-23 @ 15:30), Max: 36.8 (12-31-23 @ 15:30)  T(F): 98.2 (12-31-23 @ 15:30), Max: 98.2 (12-31-23 @ 15:30)  HR: 73 (12-31-23 @ 15:30) (55 - 73)  BP: 127/61 (12-31-23 @ 15:30) (127/61 - 140/56)  BP(mean): --  RR: 17 (12-31-23 @ 15:30) (16 - 18)  SpO2: 100% (12-31-23 @ 15:30) (100% - 100%)        PHYSICAL EXAM:  GENERAL: NAD  NECK: Supple, No JVD  CHEST/LUNG: dec breath sounds at bases  HEART: Regular rate and rhythm; No murmurs, rubs, or gallops  ABDOMEN: Soft, Nontender, Nondistended; Bowel sounds present  EXTREMITIES:   No edema  NEUROLOGY: AAO X 3      LABS:                        12.3   5.74  )-----------( 157      ( 30 Dec 2023 07:20 )             36.9     12-30    143  |  107  |  15  ----------------------------<  82  3.9   |  24  |  0.74    Ca    8.7      30 Dec 2023 07:20  Phos  3.4     12-30  Mg     1.80     12-30    TPro  6.4  /  Alb  3.6  /  TBili  0.4  /  DBili  x   /  AST  20  /  ALT  8   /  AlkPhos  41  12-29          Urinalysis Basic - ( 30 Dec 2023 07:20 )    Color: x / Appearance: x / SG: x / pH: x  Gluc: 82 mg/dL / Ketone: x  / Bili: x / Urobili: x   Blood: x / Protein: x / Nitrite: x   Leuk Esterase: x / RBC: x / WBC x   Sq Epi: x / Non Sq Epi: x / Bacteria: x      CAPILLARY BLOOD GLUCOSE            RADIOLOGY & ADDITIONAL TESTS:    Imaging Personally Reviewed:    Consultant(s) Notes Reviewed:      Care Discussed with Consultants/Other Providers:    Steven Darling MD, CMD, FACP    [address] [place] [address]  Office Tel: [phone]  Cell: [phone]   Patient is a 83y old  Female who presents with a chief complaint of confusion   12/31/23      SUBJECTIVE / OVERNIGHT EVENTS:pt seen and examined    MEDICATIONS  (STANDING):  cefTRIAXone   IVPB 1000 milliGRAM(s) IV Intermittent every 24 hours    MEDICATIONS  (PRN):  acetaminophen     Tablet .. 650 milliGRAM(s) Oral every 6 hours PRN Temp greater or equal to 38C (100.4F), Mild Pain (1 - 3)    Vital Signs Last 24 Hrs  T(C): 36.8 (12-31-23 @ 15:30), Max: 36.8 (12-31-23 @ 15:30)  T(F): 98.2 (12-31-23 @ 15:30), Max: 98.2 (12-31-23 @ 15:30)  HR: 73 (12-31-23 @ 15:30) (55 - 73)  BP: 127/61 (12-31-23 @ 15:30) (127/61 - 140/56)  BP(mean): --  RR: 17 (12-31-23 @ 15:30) (16 - 18)  SpO2: 100% (12-31-23 @ 15:30) (100% - 100%)        PHYSICAL EXAM:  GENERAL: NAD  NECK: Supple, No JVD  CHEST/LUNG: dec breath sounds at bases  HEART: Regular rate and rhythm; No murmurs, rubs, or gallops  ABDOMEN: Soft, Nontender, Nondistended; Bowel sounds present  EXTREMITIES:   No edema  NEUROLOGY: AAO X 3      LABS:                        12.3   5.74  )-----------( 157      ( 30 Dec 2023 07:20 )             36.9     12-30    143  |  107  |  15  ----------------------------<  82  3.9   |  24  |  0.74    Ca    8.7      30 Dec 2023 07:20  Phos  3.4     12-30  Mg     1.80     12-30    TPro  6.4  /  Alb  3.6  /  TBili  0.4  /  DBili  x   /  AST  20  /  ALT  8   /  AlkPhos  41  12-29          Urinalysis Basic - ( 30 Dec 2023 07:20 )    Color: x / Appearance: x / SG: x / pH: x  Gluc: 82 mg/dL / Ketone: x  / Bili: x / Urobili: x   Blood: x / Protein: x / Nitrite: x   Leuk Esterase: x / RBC: x / WBC x   Sq Epi: x / Non Sq Epi: x / Bacteria: x      CAPILLARY BLOOD GLUCOSE            RADIOLOGY & ADDITIONAL TESTS:    Imaging Personally Reviewed:    Consultant(s) Notes Reviewed:      Care Discussed with Consultants/Other Providers:    Steven Darling MD, CMD, FACP    [address] [place] [address]  Office Tel: [phone]  Cell: [phone]

## 2023-12-31 NOTE — DIETITIAN INITIAL EVALUATION ADULT - NAME AND PHONE
Shayy Kuhn, MS, RDN, CDN, CNSC on MS TEAMS or Pager #[phone]  Shayy Kuhn, MS, RDN, CDN, CNSC on MS TEAMS or Pager #[phone]

## 2023-12-31 NOTE — DIETITIAN INITIAL EVALUATION ADULT - PERTINENT LABORATORY DATA
12-31    145  |  103  |  13  ----------------------------<  170<H>  3.8   |  28  |  0.82    Ca    9.4      31 Dec 2023 09:41  Phos  3.1     12-31  Mg     1.80     12-31    TPro  6.4  /  Alb  3.6  /  TBili  0.4  /  DBili  x   /  AST  20  /  ALT  8   /  AlkPhos  41  12-29

## 2023-12-31 NOTE — DIETITIAN INITIAL EVALUATION ADULT - OTHER INFO
82 y/o female with hx of dementia with confusion and dx with UTI. Encountered pt walking in the hallway. Pt was disoriented and repeatedly asked writer where she could sit because "they were coming to pick her up." She was unable to be re-directed to her room and could not provide any appreciable nutrition hx. Height/weight unknown - please obtain in order to fully assess pt's nutrition needs. Spoke with nursing staff who reported that pt has been eating fairly. No GI distress noted. NKFA listed. Upon visualization, pt appears thin and exhibits mild subcutaneous fat store loss and muscle mass wasting which presents her at moderate risk for malnutrition. Will provide Vital Shakes (520 kcal, 22 gm protein per 8.5 oz) to enhance oral intake and overall nutrition of patient. Encourage and monitor oral intake, especially of nutrition supplement. RDN services to remain available as needed.  84 y/o female with hx of dementia with confusion and dx with UTI. Encountered pt walking in the hallway. Pt was disoriented and repeatedly asked writer where she could sit because "they were coming to pick her up." She was unable to be re-directed to her room and could not provide any appreciable nutrition hx. Height/weight unknown - please obtain in order to fully assess pt's nutrition needs. Spoke with nursing staff who reported that pt has been eating fairly. No GI distress noted. NKFA listed. Upon visualization, pt appears thin and exhibits mild subcutaneous fat store loss and muscle mass wasting which presents her at moderate risk for malnutrition. Will provide Vital Shakes (520 kcal, 22 gm protein per 8.5 oz) to enhance oral intake and overall nutrition of patient. Encourage and monitor oral intake, especially of nutrition supplement. RDN services to remain available as needed.

## 2024-01-01 LAB
ANION GAP SERPL CALC-SCNC: 7 MMOL/L — SIGNIFICANT CHANGE UP (ref 7–14)
ANION GAP SERPL CALC-SCNC: 7 MMOL/L — SIGNIFICANT CHANGE UP (ref 7–14)
BASOPHILS # BLD AUTO: 0.02 K/UL — SIGNIFICANT CHANGE UP (ref 0–0.2)
BASOPHILS # BLD AUTO: 0.02 K/UL — SIGNIFICANT CHANGE UP (ref 0–0.2)
BASOPHILS NFR BLD AUTO: 0.4 % — SIGNIFICANT CHANGE UP (ref 0–2)
BASOPHILS NFR BLD AUTO: 0.4 % — SIGNIFICANT CHANGE UP (ref 0–2)
BUN SERPL-MCNC: 15 MG/DL — SIGNIFICANT CHANGE UP (ref 7–23)
BUN SERPL-MCNC: 15 MG/DL — SIGNIFICANT CHANGE UP (ref 7–23)
CALCIUM SERPL-MCNC: 8.8 MG/DL — SIGNIFICANT CHANGE UP (ref 8.4–10.5)
CALCIUM SERPL-MCNC: 8.8 MG/DL — SIGNIFICANT CHANGE UP (ref 8.4–10.5)
CHLORIDE SERPL-SCNC: 106 MMOL/L — SIGNIFICANT CHANGE UP (ref 98–107)
CHLORIDE SERPL-SCNC: 106 MMOL/L — SIGNIFICANT CHANGE UP (ref 98–107)
CO2 SERPL-SCNC: 28 MMOL/L — SIGNIFICANT CHANGE UP (ref 22–31)
CO2 SERPL-SCNC: 28 MMOL/L — SIGNIFICANT CHANGE UP (ref 22–31)
CREAT SERPL-MCNC: 0.86 MG/DL — SIGNIFICANT CHANGE UP (ref 0.5–1.3)
CREAT SERPL-MCNC: 0.86 MG/DL — SIGNIFICANT CHANGE UP (ref 0.5–1.3)
EGFR: 67 ML/MIN/1.73M2 — SIGNIFICANT CHANGE UP
EGFR: 67 ML/MIN/1.73M2 — SIGNIFICANT CHANGE UP
EOSINOPHIL # BLD AUTO: 0.12 K/UL — SIGNIFICANT CHANGE UP (ref 0–0.5)
EOSINOPHIL # BLD AUTO: 0.12 K/UL — SIGNIFICANT CHANGE UP (ref 0–0.5)
EOSINOPHIL NFR BLD AUTO: 2.2 % — SIGNIFICANT CHANGE UP (ref 0–6)
EOSINOPHIL NFR BLD AUTO: 2.2 % — SIGNIFICANT CHANGE UP (ref 0–6)
GLUCOSE SERPL-MCNC: 92 MG/DL — SIGNIFICANT CHANGE UP (ref 70–99)
GLUCOSE SERPL-MCNC: 92 MG/DL — SIGNIFICANT CHANGE UP (ref 70–99)
HCT VFR BLD CALC: 38 % — SIGNIFICANT CHANGE UP (ref 34.5–45)
HCT VFR BLD CALC: 38 % — SIGNIFICANT CHANGE UP (ref 34.5–45)
HGB BLD-MCNC: 12.8 G/DL — SIGNIFICANT CHANGE UP (ref 11.5–15.5)
HGB BLD-MCNC: 12.8 G/DL — SIGNIFICANT CHANGE UP (ref 11.5–15.5)
IANC: 3.55 K/UL — SIGNIFICANT CHANGE UP (ref 1.8–7.4)
IANC: 3.55 K/UL — SIGNIFICANT CHANGE UP (ref 1.8–7.4)
IMM GRANULOCYTES NFR BLD AUTO: 0.2 % — SIGNIFICANT CHANGE UP (ref 0–0.9)
IMM GRANULOCYTES NFR BLD AUTO: 0.2 % — SIGNIFICANT CHANGE UP (ref 0–0.9)
LYMPHOCYTES # BLD AUTO: 1.28 K/UL — SIGNIFICANT CHANGE UP (ref 1–3.3)
LYMPHOCYTES # BLD AUTO: 1.28 K/UL — SIGNIFICANT CHANGE UP (ref 1–3.3)
LYMPHOCYTES # BLD AUTO: 23.2 % — SIGNIFICANT CHANGE UP (ref 13–44)
LYMPHOCYTES # BLD AUTO: 23.2 % — SIGNIFICANT CHANGE UP (ref 13–44)
MCHC RBC-ENTMCNC: 30.5 PG — SIGNIFICANT CHANGE UP (ref 27–34)
MCHC RBC-ENTMCNC: 30.5 PG — SIGNIFICANT CHANGE UP (ref 27–34)
MCHC RBC-ENTMCNC: 33.7 GM/DL — SIGNIFICANT CHANGE UP (ref 32–36)
MCHC RBC-ENTMCNC: 33.7 GM/DL — SIGNIFICANT CHANGE UP (ref 32–36)
MCV RBC AUTO: 90.5 FL — SIGNIFICANT CHANGE UP (ref 80–100)
MCV RBC AUTO: 90.5 FL — SIGNIFICANT CHANGE UP (ref 80–100)
MONOCYTES # BLD AUTO: 0.54 K/UL — SIGNIFICANT CHANGE UP (ref 0–0.9)
MONOCYTES # BLD AUTO: 0.54 K/UL — SIGNIFICANT CHANGE UP (ref 0–0.9)
MONOCYTES NFR BLD AUTO: 9.8 % — SIGNIFICANT CHANGE UP (ref 2–14)
MONOCYTES NFR BLD AUTO: 9.8 % — SIGNIFICANT CHANGE UP (ref 2–14)
NEUTROPHILS # BLD AUTO: 3.55 K/UL — SIGNIFICANT CHANGE UP (ref 1.8–7.4)
NEUTROPHILS # BLD AUTO: 3.55 K/UL — SIGNIFICANT CHANGE UP (ref 1.8–7.4)
NEUTROPHILS NFR BLD AUTO: 64.2 % — SIGNIFICANT CHANGE UP (ref 43–77)
NEUTROPHILS NFR BLD AUTO: 64.2 % — SIGNIFICANT CHANGE UP (ref 43–77)
NRBC # BLD: 0 /100 WBCS — SIGNIFICANT CHANGE UP (ref 0–0)
NRBC # BLD: 0 /100 WBCS — SIGNIFICANT CHANGE UP (ref 0–0)
NRBC # FLD: 0 K/UL — SIGNIFICANT CHANGE UP (ref 0–0)
NRBC # FLD: 0 K/UL — SIGNIFICANT CHANGE UP (ref 0–0)
PLATELET # BLD AUTO: 149 K/UL — LOW (ref 150–400)
PLATELET # BLD AUTO: 149 K/UL — LOW (ref 150–400)
POTASSIUM SERPL-MCNC: 3.8 MMOL/L — SIGNIFICANT CHANGE UP (ref 3.5–5.3)
POTASSIUM SERPL-MCNC: 3.8 MMOL/L — SIGNIFICANT CHANGE UP (ref 3.5–5.3)
POTASSIUM SERPL-SCNC: 3.8 MMOL/L — SIGNIFICANT CHANGE UP (ref 3.5–5.3)
POTASSIUM SERPL-SCNC: 3.8 MMOL/L — SIGNIFICANT CHANGE UP (ref 3.5–5.3)
RBC # BLD: 4.2 M/UL — SIGNIFICANT CHANGE UP (ref 3.8–5.2)
RBC # BLD: 4.2 M/UL — SIGNIFICANT CHANGE UP (ref 3.8–5.2)
RBC # FLD: 13.3 % — SIGNIFICANT CHANGE UP (ref 10.3–14.5)
RBC # FLD: 13.3 % — SIGNIFICANT CHANGE UP (ref 10.3–14.5)
SODIUM SERPL-SCNC: 141 MMOL/L — SIGNIFICANT CHANGE UP (ref 135–145)
SODIUM SERPL-SCNC: 141 MMOL/L — SIGNIFICANT CHANGE UP (ref 135–145)
WBC # BLD: 5.52 K/UL — SIGNIFICANT CHANGE UP (ref 3.8–10.5)
WBC # BLD: 5.52 K/UL — SIGNIFICANT CHANGE UP (ref 3.8–10.5)
WBC # FLD AUTO: 5.52 K/UL — SIGNIFICANT CHANGE UP (ref 3.8–10.5)
WBC # FLD AUTO: 5.52 K/UL — SIGNIFICANT CHANGE UP (ref 3.8–10.5)

## 2024-01-01 PROCEDURE — 74018 RADEX ABDOMEN 1 VIEW: CPT | Mod: 26

## 2024-01-01 NOTE — PROGRESS NOTE ADULT - SUBJECTIVE AND OBJECTIVE BOX
Patient is a 83y old  Female who presents with a chief complaint of confusion   1/1/24      SUBJECTIVE / OVERNIGHT EVENTS:pt seen and examined    MEDICATIONS  (STANDING):    MEDICATIONS  (PRN):  acetaminophen     Tablet .. 650 milliGRAM(s) Oral every 6 hours PRN Temp greater or equal to 38C (100.4F), Mild Pain (1 - 3)    Vital Signs Last 24 Hrs  T(C): 36.4 (01-01-24 @ 20:45), Max: 37 (01-01-24 @ 05:00)  T(F): 97.6 (01-01-24 @ 20:45), Max: 98.6 (01-01-24 @ 05:00)  HR: 55 (01-01-24 @ 20:45) (55 - 68)  BP: 111/53 (01-01-24 @ 20:45) (111/53 - 121/61)  BP(mean): --  RR: 18 (01-01-24 @ 20:45) (17 - 18)  SpO2: 99% (01-01-24 @ 20:45) (98% - 99%)        PHYSICAL EXAM:  GENERAL: NAD  NECK: Supple, No JVD  CHEST/LUNG: dec breath sounds at bases  HEART: Regular rate and rhythm; No murmurs, rubs, or gallops  ABDOMEN: Soft, Nontender, Nondistended; Bowel sounds present  EXTREMITIES:   No edema  NEUROLOGY: AAO X 3    LABS:  01-01    141  |  106  |  15  ----------------------------<  92  3.8   |  28  |  0.86    Ca    8.8      01 Jan 2024 09:15  Phos  3.1     12-31  Mg     1.80     12-31      Creatinine Trend: 0.86 <--, 0.82 <--, 0.74 <--, 0.83 <--                        12.8   5.52  )-----------( 149      ( 01 Jan 2024 09:15 )             38.0     Urine Studies:  Urinalysis Basic - ( 01 Jan 2024 09:15 )    Color:  / Appearance:  / SG:  / pH:   Gluc: 92 mg/dL / Ketone:   / Bili:  / Urobili:    Blood:  / Protein:  / Nitrite:    Leuk Esterase:  / RBC:  / WBC    Sq Epi:  / Non Sq Epi:  / Bacteria:                             RADIOLOGY & ADDITIONAL TESTS:    Imaging Personally Reviewed:yes    Consultant(s) Notes Reviewed:  yes    Care Discussed with Consultants/Other Providers:yes

## 2024-01-01 NOTE — PROGRESS NOTE ADULT - ASSESSMENT
84 y/o F with pmh of possible dementia p/w confusion.  progression of dementia  r/o infection- ruled out     plan  no fever, no leukocytosis  no hypoxia, no cough  abd exam soft  no diarrhea   blood cx neg to date   ua - bland   and cx - > org    ceftriaxone - will stop now   aspiration precautions    serial abd exam  82 y/o F with pmh of possible dementia p/w confusion.  progression of dementia  r/o infection- ruled out     plan  no fever, no leukocytosis  no hypoxia, no cough  abd exam soft  no diarrhea   blood cx neg to date   ua - bland   and cx - > org    ceftriaxone - will stop now   aspiration precautions    serial abd exam  84 y/o F with pmh of possible dementia p/w confusion.  progression of dementia  r/o infection- ruled out     plan  no fever, no leukocytosis  no hypoxia, no cough  abd exam soft  no diarrhea   blood cx neg to date   ua - bland   and cx - > org    ceftriaxone - will stop now   aspiration precautions    serial abd exam   will order abd xray

## 2024-01-02 LAB
ANION GAP SERPL CALC-SCNC: 16 MMOL/L — HIGH (ref 7–14)
ANION GAP SERPL CALC-SCNC: 16 MMOL/L — HIGH (ref 7–14)
BASOPHILS # BLD AUTO: 0.04 K/UL — SIGNIFICANT CHANGE UP (ref 0–0.2)
BASOPHILS # BLD AUTO: 0.04 K/UL — SIGNIFICANT CHANGE UP (ref 0–0.2)
BASOPHILS NFR BLD AUTO: 0.4 % — SIGNIFICANT CHANGE UP (ref 0–2)
BASOPHILS NFR BLD AUTO: 0.4 % — SIGNIFICANT CHANGE UP (ref 0–2)
BUN SERPL-MCNC: 15 MG/DL — SIGNIFICANT CHANGE UP (ref 7–23)
BUN SERPL-MCNC: 15 MG/DL — SIGNIFICANT CHANGE UP (ref 7–23)
CALCIUM SERPL-MCNC: 9.1 MG/DL — SIGNIFICANT CHANGE UP (ref 8.4–10.5)
CALCIUM SERPL-MCNC: 9.1 MG/DL — SIGNIFICANT CHANGE UP (ref 8.4–10.5)
CHLORIDE SERPL-SCNC: 103 MMOL/L — SIGNIFICANT CHANGE UP (ref 98–107)
CHLORIDE SERPL-SCNC: 103 MMOL/L — SIGNIFICANT CHANGE UP (ref 98–107)
CO2 SERPL-SCNC: 23 MMOL/L — SIGNIFICANT CHANGE UP (ref 22–31)
CO2 SERPL-SCNC: 23 MMOL/L — SIGNIFICANT CHANGE UP (ref 22–31)
CREAT SERPL-MCNC: 0.77 MG/DL — SIGNIFICANT CHANGE UP (ref 0.5–1.3)
CREAT SERPL-MCNC: 0.77 MG/DL — SIGNIFICANT CHANGE UP (ref 0.5–1.3)
EGFR: 76 ML/MIN/1.73M2 — SIGNIFICANT CHANGE UP
EGFR: 76 ML/MIN/1.73M2 — SIGNIFICANT CHANGE UP
EOSINOPHIL # BLD AUTO: 0.09 K/UL — SIGNIFICANT CHANGE UP (ref 0–0.5)
EOSINOPHIL # BLD AUTO: 0.09 K/UL — SIGNIFICANT CHANGE UP (ref 0–0.5)
EOSINOPHIL NFR BLD AUTO: 1 % — SIGNIFICANT CHANGE UP (ref 0–6)
EOSINOPHIL NFR BLD AUTO: 1 % — SIGNIFICANT CHANGE UP (ref 0–6)
GLUCOSE SERPL-MCNC: 71 MG/DL — SIGNIFICANT CHANGE UP (ref 70–99)
GLUCOSE SERPL-MCNC: 71 MG/DL — SIGNIFICANT CHANGE UP (ref 70–99)
HCT VFR BLD CALC: 42.6 % — SIGNIFICANT CHANGE UP (ref 34.5–45)
HCT VFR BLD CALC: 42.6 % — SIGNIFICANT CHANGE UP (ref 34.5–45)
HGB BLD-MCNC: 13.9 G/DL — SIGNIFICANT CHANGE UP (ref 11.5–15.5)
HGB BLD-MCNC: 13.9 G/DL — SIGNIFICANT CHANGE UP (ref 11.5–15.5)
IANC: 6.87 K/UL — SIGNIFICANT CHANGE UP (ref 1.8–7.4)
IANC: 6.87 K/UL — SIGNIFICANT CHANGE UP (ref 1.8–7.4)
IMM GRANULOCYTES NFR BLD AUTO: 0.4 % — SIGNIFICANT CHANGE UP (ref 0–0.9)
IMM GRANULOCYTES NFR BLD AUTO: 0.4 % — SIGNIFICANT CHANGE UP (ref 0–0.9)
LYMPHOCYTES # BLD AUTO: 1.43 K/UL — SIGNIFICANT CHANGE UP (ref 1–3.3)
LYMPHOCYTES # BLD AUTO: 1.43 K/UL — SIGNIFICANT CHANGE UP (ref 1–3.3)
LYMPHOCYTES # BLD AUTO: 15.7 % — SIGNIFICANT CHANGE UP (ref 13–44)
LYMPHOCYTES # BLD AUTO: 15.7 % — SIGNIFICANT CHANGE UP (ref 13–44)
MCHC RBC-ENTMCNC: 30.2 PG — SIGNIFICANT CHANGE UP (ref 27–34)
MCHC RBC-ENTMCNC: 30.2 PG — SIGNIFICANT CHANGE UP (ref 27–34)
MCHC RBC-ENTMCNC: 32.6 GM/DL — SIGNIFICANT CHANGE UP (ref 32–36)
MCHC RBC-ENTMCNC: 32.6 GM/DL — SIGNIFICANT CHANGE UP (ref 32–36)
MCV RBC AUTO: 92.4 FL — SIGNIFICANT CHANGE UP (ref 80–100)
MCV RBC AUTO: 92.4 FL — SIGNIFICANT CHANGE UP (ref 80–100)
MONOCYTES # BLD AUTO: 0.66 K/UL — SIGNIFICANT CHANGE UP (ref 0–0.9)
MONOCYTES # BLD AUTO: 0.66 K/UL — SIGNIFICANT CHANGE UP (ref 0–0.9)
MONOCYTES NFR BLD AUTO: 7.2 % — SIGNIFICANT CHANGE UP (ref 2–14)
MONOCYTES NFR BLD AUTO: 7.2 % — SIGNIFICANT CHANGE UP (ref 2–14)
NEUTROPHILS # BLD AUTO: 6.87 K/UL — SIGNIFICANT CHANGE UP (ref 1.8–7.4)
NEUTROPHILS # BLD AUTO: 6.87 K/UL — SIGNIFICANT CHANGE UP (ref 1.8–7.4)
NEUTROPHILS NFR BLD AUTO: 75.3 % — SIGNIFICANT CHANGE UP (ref 43–77)
NEUTROPHILS NFR BLD AUTO: 75.3 % — SIGNIFICANT CHANGE UP (ref 43–77)
NRBC # BLD: 0 /100 WBCS — SIGNIFICANT CHANGE UP (ref 0–0)
NRBC # BLD: 0 /100 WBCS — SIGNIFICANT CHANGE UP (ref 0–0)
NRBC # FLD: 0 K/UL — SIGNIFICANT CHANGE UP (ref 0–0)
NRBC # FLD: 0 K/UL — SIGNIFICANT CHANGE UP (ref 0–0)
PLATELET # BLD AUTO: 183 K/UL — SIGNIFICANT CHANGE UP (ref 150–400)
PLATELET # BLD AUTO: 183 K/UL — SIGNIFICANT CHANGE UP (ref 150–400)
POTASSIUM SERPL-MCNC: 3.5 MMOL/L — SIGNIFICANT CHANGE UP (ref 3.5–5.3)
POTASSIUM SERPL-MCNC: 3.5 MMOL/L — SIGNIFICANT CHANGE UP (ref 3.5–5.3)
POTASSIUM SERPL-SCNC: 3.5 MMOL/L — SIGNIFICANT CHANGE UP (ref 3.5–5.3)
POTASSIUM SERPL-SCNC: 3.5 MMOL/L — SIGNIFICANT CHANGE UP (ref 3.5–5.3)
RBC # BLD: 4.61 M/UL — SIGNIFICANT CHANGE UP (ref 3.8–5.2)
RBC # BLD: 4.61 M/UL — SIGNIFICANT CHANGE UP (ref 3.8–5.2)
RBC # FLD: 13.3 % — SIGNIFICANT CHANGE UP (ref 10.3–14.5)
RBC # FLD: 13.3 % — SIGNIFICANT CHANGE UP (ref 10.3–14.5)
SODIUM SERPL-SCNC: 142 MMOL/L — SIGNIFICANT CHANGE UP (ref 135–145)
SODIUM SERPL-SCNC: 142 MMOL/L — SIGNIFICANT CHANGE UP (ref 135–145)
WBC # BLD: 9.13 K/UL — SIGNIFICANT CHANGE UP (ref 3.8–10.5)
WBC # BLD: 9.13 K/UL — SIGNIFICANT CHANGE UP (ref 3.8–10.5)
WBC # FLD AUTO: 9.13 K/UL — SIGNIFICANT CHANGE UP (ref 3.8–10.5)
WBC # FLD AUTO: 9.13 K/UL — SIGNIFICANT CHANGE UP (ref 3.8–10.5)

## 2024-01-02 PROCEDURE — 70450 CT HEAD/BRAIN W/O DYE: CPT | Mod: 26

## 2024-01-02 PROCEDURE — 90792 PSYCH DIAG EVAL W/MED SRVCS: CPT

## 2024-01-02 NOTE — CONSULT NOTE ADULT - ASSESSMENT
82 y/o F with pmh of possible dementia p/w confusion, found wandering in the grocery store confused and brought to ED for eval. Found to have UTI, on Ceftriaxone  Currently confused, does not know where she is, witnessed roaming hallways trying to get into others rooms.   Labs wnl, slight leukocytosis that has since normalized    Encephalopathy likely due to UTI superimposed on dementia    Plan:  - can check CTH as not yet done  - check B12, folate, TSH, RPR  - continue treatment of UTI  - delirium precautions:   [] please have adequate sleeping environment for the patient, light on, curtains open, TV on during the day with regular stimulation and out of bed to chair if possible. During the night, close curtains, TV off, lights off, and minimize interruptions (limit blood draws and vital sign checks if possible). Regular interaction with patient with staff (introducing selves), frequent reorientation, and encouragement of visitors as allowed by hospital and unit policy. Regular toileting.  [] limitation of sedating medications as tolerated  [] maintain electrolytes within normal limits   - appreciate ID/psych recs  - PT/OT  - DVT ppx

## 2024-01-02 NOTE — BH CONSULTATION LIAISON ASSESSMENT NOTE - OTHER
paranoia " keeping her against her well because she is not doing the job right" but patient was not able to elaborate more.

## 2024-01-02 NOTE — BH CONSULTATION LIAISON ASSESSMENT NOTE - CURRENT MEDICATION
MEDICATIONS  (STANDING):    MEDICATIONS  (PRN):  acetaminophen     Tablet .. 650 milliGRAM(s) Oral every 6 hours PRN Temp greater or equal to 38C (100.4F), Mild Pain (1 - 3)

## 2024-01-02 NOTE — BH CONSULTATION LIAISON ASSESSMENT NOTE - HPI (INCLUDE ILLNESS QUALITY, SEVERITY, DURATION, TIMING, CONTEXT, MODIFYING FACTORS, ASSOCIATED SIGNS AND SYMPTOMS)
82 y/o F with PMH of possible dementia p/w confusion.  Pt was found by bystander (who is a nurse) wandering by a local grocery store and appeared confused.  Bystander brought pt to her home and noted pt's home was in a poor state, and did not seem safe for pt to live alone.  Pt brought to ED by EMS.  82 y/o F with PMH of possible dementia p/w confusion.  Pt was found by bystander (who is a nurse) wandering by a local grocery store and appeared confused.  Bystander brought pt to her home and noted pt's home was in a poor state, and did not seem safe for pt to live alone.  Pt brought to ED by EMS.     On evaluation patient is confused, thought she is in an apartment, trying to enter other patients rooms and the nursing station. She has some insight that she feels confused sometimes. When asked if she feels safe in the hospital she said no because they are keeping me against my well because I did not do the job right. She is not sure why she is in the hospital and was not sure if she was living with anyone but states that her family are "broken". Denies SI/HI and AVH. Denies past psychiatric history.

## 2024-01-02 NOTE — BH CONSULTATION LIAISON ASSESSMENT NOTE - RISK ASSESSMENT
Patient with no past psychiatric history, no known past SI/SA or SIB. denies any current safety concerns.

## 2024-01-02 NOTE — BH CONSULTATION LIAISON ASSESSMENT NOTE - NSBHATTESTBILLING_PSY_A_CORE
[id number] diagnostic evaluation with medical services [id number] diagnostic evaluation with medical services

## 2024-01-02 NOTE — PROGRESS NOTE ADULT - ASSESSMENT
82 y/o F with pmh of possible dementia p/w confusion.  progression of dementia  r/o infection- ruled out     no fever, no leukocytosis  blood cx- NGTD  ua - bland, urine culture contaminated  s/p ceftriaxone x 3 days, last 12/31    Recommendations  continue off antibiotics  aspiration precautions    We will sign off. Thank you for allowing us to participate in the care of Ms. Keene. Please feel free to call with any questions or concerns.     Nathanael Dillard M.D.  [facility], Division of Infectious Diseases  [phone]  After 5pm on weekdays and all day on weekends - please call [phone]  84 y/o F with pmh of possible dementia p/w confusion.  progression of dementia  r/o infection- ruled out     no fever, no leukocytosis  blood cx- NGTD  ua - bland, urine culture contaminated  s/p ceftriaxone x 3 days, last 12/31    Recommendations  continue off antibiotics  aspiration precautions    We will sign off. Thank you for allowing us to participate in the care of Ms. Keene. Please feel free to call with any questions or concerns.     Nathanael Dillard M.D.  [facility], Division of Infectious Diseases  [phone]  After 5pm on weekdays and all day on weekends - please call [phone]

## 2024-01-02 NOTE — BH CONSULTATION LIAISON ASSESSMENT NOTE - NSBHCHARTREVIEWVS_PSY_A_CORE FT
Vital Signs Last 24 Hrs  T(C): 36.4 (02 Jan 2024 11:44), Max: 37.1 (02 Jan 2024 04:08)  T(F): 97.5 (02 Jan 2024 11:44), Max: 98.7 (02 Jan 2024 04:08)  HR: 80 (02 Jan 2024 11:44) (55 - 80)  BP: 130/57 (02 Jan 2024 11:44) (111/53 - 130/57)  BP(mean): --  RR: 18 (02 Jan 2024 11:44) (17 - 18)  SpO2: 100% (02 Jan 2024 11:44) (98% - 100%)    Parameters below as of 02 Jan 2024 11:44  Patient On (Oxygen Delivery Method): room air

## 2024-01-02 NOTE — BH CONSULTATION LIAISON ASSESSMENT NOTE - NSICDXBHPRIMARYDX_PSY_ALL_CORE
Senile dementia with delirium, with behavioral disturbance   F03.912   Senile dementia with delirium, with behavioral disturbance   F03.911

## 2024-01-02 NOTE — CONSULT NOTE ADULT - SUBJECTIVE AND OBJECTIVE BOX
Neurology Consult    Reason for Consult: Patient is a 83y old  Female who presents with a chief complaint of confusion (02 Jan 2024 15:06)      HPI:  84 y/o F with pmh of possible dementia p/w confusion.  Pt was found by bystander (who is a nurse) wandering by a local grocery store and appeared confused.  Bystander brought pt to her home and noted pt's home was in a poor state, and did not seem safe for pt to live alone.  Pt brought to ED by EMS.  Pt currently confused stating "they are keeping me here against my will" but does not remember where she is or the circumstances that brought her here.      In the ED, pt was given Haldol for agitation, and had positive UA for which she was given ceftriaxone.   (29 Dec 2023 22:10)       PAST MEDICAL & SURGICAL HISTORY:  Gallstones      Hx of tonsillectomy          Allergies: Allergies    erythromycin (Other)    Intolerances        Social History: Denies toxic habits including tobacco, ETOH or illicit drugs.    Family History: FAMILY HISTORY:  No pertinent family history in first degree relatives    . No family history of strokes    Medications: MEDICATIONS  (STANDING):    MEDICATIONS  (PRN):  acetaminophen     Tablet .. 650 milliGRAM(s) Oral every 6 hours PRN Temp greater or equal to 38C (100.4F), Mild Pain (1 - 3)      Review of Systems:  CONSTITUTIONAL:  No weight loss, fever, chills, weakness or fatigue.  HEENT:  Eyes:  No visual loss, blurred vision, double vision or yellow sclera. Ears, Nose, Throat:  No hearing loss, sneezing, congestion, runny nose or sore throat.  SKIN:  No rash or itching.  CARDIOVASCULAR:  No chest pain, chest pressure or chest discomfort. No palpitations or edema.  RESPIRATORY:  No shortness of breath, cough or sputum.  GASTROINTESTINAL:  No anorexia, nausea, vomiting or diarrhea. No abdominal pain or blood.  GENITOURINARY:  No burning on urination or incontinence   NEUROLOGICAL:  No headache, dizziness, syncope, paralysis, ataxia, numbness or tingling in the extremities. No change in bowel or bladder control. no limb weakness. no vision changes.   MUSCULOSKELETAL:  No muscle, back pain, joint pain or stiffness.  HEMATOLOGIC:  No anemia, bleeding or bruising.  LYMPHATICS:  No enlarged nodes. No history of splenectomy.  PSYCHIATRIC:  No history of depression or anxiety.  ENDOCRINOLOGIC:  No reports of sweating, cold or heat intolerance. No polyuria or polydipsia.      Vitals:  Vital Signs Last 24 Hrs  T(C): 36.4 (02 Jan 2024 11:44), Max: 37.1 (02 Jan 2024 04:08)  T(F): 97.5 (02 Jan 2024 11:44), Max: 98.7 (02 Jan 2024 04:08)  HR: 80 (02 Jan 2024 11:44) (55 - 80)  BP: 130/57 (02 Jan 2024 11:44) (111/53 - 130/57)  BP(mean): --  RR: 18 (02 Jan 2024 11:44) (17 - 18)  SpO2: 100% (02 Jan 2024 11:44) (98% - 100%)    Parameters below as of 02 Jan 2024 11:44  Patient On (Oxygen Delivery Method): room air        General Exam:   General Appearance: Appropriately dressed and in no acute distress       Head: Normocephalic, atraumatic and no dysmorphic features  Ear, Nose, and Throat: Moist mucous membranes  CVS: S1S2+  Resp: No SOB, no wheeze or rhonchi  GI: soft NT/ND  Extremities: No edema or cyanosis  Skin: No bruises or rashes     Neurological Exam:  Mental Status: Awake, alert and oriented x 1-2.  Able to follow simple commands. Able to name and repeat. fluent speech. No obvious aphasia or dysarthria noted.   Cranial Nerves: PERRL, EOMI, VFFC, sensation V1-V3 intact,  no obvious facial asymmetry, equal elevation of palate, scm/trap 5/5, tongue is midline on protrusion. hearing is grossly intact.   Motor: Normal bulk, tone and strength throughout. Fine finger movements were intact and symmetric. no tremors or drift noted.    Sensation: Intact to light touch and pinprick throughout. no right/left confusion. no extinction to tactile on DSS.   Reflexes: 1+ throughout at biceps, brachioradialis, triceps, patellars and ankles bilaterally and equal. No clonus. R toe and L toe were both downgoing.  Coordination: No dysmetria on FNF  Gait: Narrow based and steady.     Data/Labs/Imaging which I personally reviewed.     Labs:     CBC Full  -  ( 02 Jan 2024 05:30 )  WBC Count : 9.13 K/uL  RBC Count : 4.61 M/uL  Hemoglobin : 13.9 g/dL  Hematocrit : 42.6 %  Platelet Count - Automated : 183 K/uL  Mean Cell Volume : 92.4 fL  Mean Cell Hemoglobin : 30.2 pg  Mean Cell Hemoglobin Concentration : 32.6 gm/dL  Auto Neutrophil # : 6.87 K/uL  Auto Lymphocyte # : 1.43 K/uL  Auto Monocyte # : 0.66 K/uL  Auto Eosinophil # : 0.09 K/uL  Auto Basophil # : 0.04 K/uL  Auto Neutrophil % : 75.3 %  Auto Lymphocyte % : 15.7 %  Auto Monocyte % : 7.2 %  Auto Eosinophil % : 1.0 %  Auto Basophil % : 0.4 %    01-02    142  |  103  |  15  ----------------------------<  71  3.5   |  23  |  0.77    Ca    9.1      02 Jan 2024 05:30          Urinalysis Basic - ( 02 Jan 2024 05:30 )    Color: x / Appearance: x / SG: x / pH: x  Gluc: 71 mg/dL / Ketone: x  / Bili: x / Urobili: x   Blood: x / Protein: x / Nitrite: x   Leuk Esterase: x / RBC: x / WBC x   Sq Epi: x / Non Sq Epi: x / Bacteria: x

## 2024-01-02 NOTE — BH CONSULTATION LIAISON ASSESSMENT NOTE - SUMMARY
82 y/o F with PMH of possible dementia p/w confusion.  Pt was found by bystander (who is a nurse) wandering by a local grocery store and appeared confused.  Bystander brought pt to her home and noted pt's home was in a poor state, and did not seem safe for pt to live alone.  Pt brought to ED by EMS.     On evaluation patient is alert, oriented X1, calm but confused, needed a lot of redirection but was not agitated. in the ED patient found to have UTI and antibiotic was started. with no know past psychiatric history and her history of infection it is most likely to be delirium with underlying dementia. currently patient is calm, did not need any PRNs for the last 2 days.     Plan:   - Routine observation   - Medical management per primary team   - For agitation can use Olanzapine 1.25 mg PRN Q6hr. Monitor QTC    Dispo: No role for inpatient psychiatry admission but patient might benefit from higher level of dispo like a nursing home.  84 y/o F with PMH of possible dementia p/w confusion.  Pt was found by bystander (who is a nurse) wandering by a local grocery store and appeared confused.  Bystander brought pt to her home and noted pt's home was in a poor state, and did not seem safe for pt to live alone.  Pt brought to ED by EMS.     On evaluation patient is alert, oriented X1, calm but confused, needed a lot of redirection but was not agitated. in the ED patient found to have UTI and antibiotic was started. with no know past psychiatric history and her history of infection it is most likely to be delirium with underlying dementia. currently patient is calm, did not need any PRNs for the last 2 days.     Plan:   - Routine observation   - Medical management per primary team   - For agitation can use Olanzapine 1.25 mg PRN Q6hr. Monitor QTC    Dispo: No role for inpatient psychiatry admission but patient might benefit from higher level of dispo like a nursing home.

## 2024-01-02 NOTE — PROGRESS NOTE ADULT - SUBJECTIVE AND OBJECTIVE BOX
Patient is a 83y old  Female who presents with a chief complaint of confusion   1/2/24      SUBJECTIVE / OVERNIGHT EVENTS:pt seen and examined    MEDICATIONS  (STANDING):    MEDICATIONS  (STANDING):    MEDICATIONS  (PRN):  acetaminophen     Tablet .. 650 milliGRAM(s) Oral every 6 hours PRN Temp greater or equal to 38C (100.4F), Mild Pain (1 - 3)    Vital Signs Last 24 Hrs  T(C): 36.8 (01-02-24 @ 20:52), Max: 37.1 (01-02-24 @ 04:08)  T(F): 98.2 (01-02-24 @ 20:52), Max: 98.7 (01-02-24 @ 04:08)  HR: 59 (01-02-24 @ 20:52) (59 - 80)  BP: 104/62 (01-02-24 @ 20:52) (104/62 - 130/57)  BP(mean): --  RR: 17 (01-02-24 @ 20:52) (17 - 18)  SpO2: 99% (01-02-24 @ 20:52) (98% - 100%)          PHYSICAL EXAM:  GENERAL: NAD  NECK: Supple, No JVD  CHEST/LUNG: dec breath sounds at bases  HEART: Regular rate and rhythm; No murmurs, rubs, or gallops  ABDOMEN: Soft, Nontender, Nondistended; Bowel sounds present  EXTREMITIES:   No edema  NEUROLOGY: AAO X 3    LABS:  01-02    142  |  103  |  15  ----------------------------<  71  3.5   |  23  |  0.77    Ca    9.1      02 Jan 2024 05:30      Creatinine Trend: 0.77 <--, 0.86 <--, 0.82 <--, 0.74 <--, 0.83 <--                        13.9   9.13  )-----------( 183      ( 02 Jan 2024 05:30 )             42.6     Urine Studies:  Urinalysis Basic - ( 02 Jan 2024 05:30 )    Color:  / Appearance:  / SG:  / pH:   Gluc: 71 mg/dL / Ketone:   / Bili:  / Urobili:    Blood:  / Protein:  / Nitrite:    Leuk Esterase:  / RBC:  / WBC    Sq Epi:  / Non Sq Epi:  / Bacteria:                                         RADIOLOGY & ADDITIONAL TESTS:    Imaging Personally Reviewed:yes    Consultant(s) Notes Reviewed:  yes    Care Discussed with Consultants/Other Providers:yes

## 2024-01-02 NOTE — PROGRESS NOTE ADULT - SUBJECTIVE AND OBJECTIVE BOX
[facility], Division of Infectious Diseases  KENDRICK Rai Y. Patel, S. Shah, G. Nishant  [phone]  ([phone] - weekdays after 5pm and weekends)    Name: MONE LAU  Age/Gender: 83y Female  MRN: [id number]    Interval History:  Notes reviewed.   No concerning overnight events.  Afebrile.     Allergies: erythromycin (Other)      Objective:  Vitals:   T(F): 97.5 (01-02-24 @ 11:44), Max: 98.7 (01-02-24 @ 04:08)  HR: 80 (01-02-24 @ 11:44) (55 - 80)  BP: 130/57 (01-02-24 @ 11:44) (111/53 - 130/57)  RR: 18 (01-02-24 @ 11:44) (17 - 18)  SpO2: 100% (01-02-24 @ 11:44) (98% - 100%)  Physical Examination:  General: no acute distress  HEENT: anicteric  Cardio: S1, S2, normal rate  Resp: clear to auscultation  Abd: soft, NT, ND  Ext: no LE edema  Skin: warm, dry    Laboratory Studies:  CBC:                       13.9   9.13  )-----------( 183      ( 02 Jan 2024 05:30 )             42.6     WBC Trend:  9.13 01-02-24 @ 05:30  5.52 01-01-24 @ 09:15  11.00 12-31-23 @ 09:41  5.74 12-30-23 @ 07:20  8.62 12-29-23 @ 14:27    CMP: 01-02    142  |  103  |  15  ----------------------------<  71  3.5   |  23  |  0.77    Ca    9.1      02 Jan 2024 05:30            Urinalysis Basic - ( 02 Jan 2024 05:30 )    Color: x / Appearance: x / SG: x / pH: x  Gluc: 71 mg/dL / Ketone: x  / Bili: x / Urobili: x   Blood: x / Protein: x / Nitrite: x   Leuk Esterase: x / RBC: x / WBC x   Sq Epi: x / Non Sq Epi: x / Bacteria: x      Microbiology: reviewed     Culture - Blood (collected 12-30-23 @ 15:02)  Source: .Blood Blood-Venous  Preliminary Report (01-01-24 @ 20:02):    No growth at 48 Hours    Culture - Blood (collected 12-30-23 @ 14:47)  Source: .Blood Blood-Peripheral  Preliminary Report (01-01-24 @ 20:02):    No growth at 48 Hours    Culture - Urine (collected 12-29-23 @ 14:41)  Source: Clean Catch Clean Catch (Midstream)  Final Report (12-31-23 @ 00:29):    >=3 organisms. Probable collection contamination.        Radiology: reviewed     Medications:  acetaminophen     Tablet .. 650 milliGRAM(s) Oral every 6 hours PRN    Antimicrobials:   [facility], Division of Infectious Diseases  KENDRICK Rai Y. Patel, S. Shah, G. Nishant  [phone]  ([phone] - weekdays after 5pm and weekends)    Name: MONE LAU  Age/Gender: 83y Female  MRN: [id number]    Interval History:  Notes reviewed.   No concerning overnight events.  Afebrile.     Allergies: erythromycin (Other)      Objective:  Vitals:   T(F): 97.5 (01-02-24 @ 11:44), Max: 98.7 (01-02-24 @ 04:08)  HR: 80 (01-02-24 @ 11:44) (55 - 80)  BP: 130/57 (01-02-24 @ 11:44) (111/53 - 130/57)  RR: 18 (01-02-24 @ 11:44) (17 - 18)  SpO2: 100% (01-02-24 @ 11:44) (98% - 100%)  Physical Examination:  General: no acute distress  HEENT: anicteric  Cardio: S1, S2, normal rate  Resp: clear to auscultation  Abd: soft, NT, ND  Ext: no LE edema  Skin: warm, dry    Laboratory Studies:  CBC:                       13.9   9.13  )-----------( 183      ( 02 Jan 2024 05:30 )             42.6     WBC Trend:  9.13 01-02-24 @ 05:30  5.52 01-01-24 @ 09:15  11.00 12-31-23 @ 09:41  5.74 12-30-23 @ 07:20  8.62 12-29-23 @ 14:27    CMP: 01-02    142  |  103  |  15  ----------------------------<  71  3.5   |  23  |  0.77    Ca    9.1      02 Jan 2024 05:30            Urinalysis Basic - ( 02 Jan 2024 05:30 )    Color: x / Appearance: x / SG: x / pH: x  Gluc: 71 mg/dL / Ketone: x  / Bili: x / Urobili: x   Blood: x / Protein: x / Nitrite: x   Leuk Esterase: x / RBC: x / WBC x   Sq Epi: x / Non Sq Epi: x / Bacteria: x      Microbiology: reviewed     Culture - Blood (collected 12-30-23 @ 15:02)  Source: .Blood Blood-Venous  Preliminary Report (01-01-24 @ 20:02):    No growth at 48 Hours    Culture - Blood (collected 12-30-23 @ 14:47)  Source: .Blood Blood-Peripheral  Preliminary Report (01-01-24 @ 20:02):    No growth at 48 Hours    Culture - Urine (collected 12-29-23 @ 14:41)  Source: Clean Catch Clean Catch (Midstream)  Final Report (12-31-23 @ 00:29):    >=3 organisms. Probable collection contamination.        Radiology: reviewed     Medications:  acetaminophen     Tablet .. 650 milliGRAM(s) Oral every 6 hours PRN    Antimicrobials:

## 2024-01-02 NOTE — BH CONSULTATION LIAISON ASSESSMENT NOTE - NSBHATTESTCOMMENTATTENDFT_PSY_A_CORE
agree with above - 83 year old F, likely dementia, poor attention to ADLs resulting in APS involvement, currently very confused and disoriented though easily redirectable. Not needing PRNs. Mood stable. Denies AH/VH, delusions or paranoia. Denies suicidality/intent or plan and denies homicidality/intent or plan. No insight or understanding into nature of her cognitive limitations. AOx1, recall 0/3, attention poor. No role for inpt psych. No psych c/i to discharge to safe dispo such has [facility], [facility] etc.

## 2024-01-03 LAB
ANION GAP SERPL CALC-SCNC: 11 MMOL/L — SIGNIFICANT CHANGE UP (ref 7–14)
ANION GAP SERPL CALC-SCNC: 11 MMOL/L — SIGNIFICANT CHANGE UP (ref 7–14)
BASOPHILS # BLD AUTO: 0.03 K/UL — SIGNIFICANT CHANGE UP (ref 0–0.2)
BASOPHILS # BLD AUTO: 0.03 K/UL — SIGNIFICANT CHANGE UP (ref 0–0.2)
BASOPHILS NFR BLD AUTO: 0.5 % — SIGNIFICANT CHANGE UP (ref 0–2)
BASOPHILS NFR BLD AUTO: 0.5 % — SIGNIFICANT CHANGE UP (ref 0–2)
BUN SERPL-MCNC: 18 MG/DL — SIGNIFICANT CHANGE UP (ref 7–23)
BUN SERPL-MCNC: 18 MG/DL — SIGNIFICANT CHANGE UP (ref 7–23)
CALCIUM SERPL-MCNC: 9 MG/DL — SIGNIFICANT CHANGE UP (ref 8.4–10.5)
CALCIUM SERPL-MCNC: 9 MG/DL — SIGNIFICANT CHANGE UP (ref 8.4–10.5)
CHLORIDE SERPL-SCNC: 107 MMOL/L — SIGNIFICANT CHANGE UP (ref 98–107)
CHLORIDE SERPL-SCNC: 107 MMOL/L — SIGNIFICANT CHANGE UP (ref 98–107)
CO2 SERPL-SCNC: 24 MMOL/L — SIGNIFICANT CHANGE UP (ref 22–31)
CO2 SERPL-SCNC: 24 MMOL/L — SIGNIFICANT CHANGE UP (ref 22–31)
CREAT SERPL-MCNC: 0.81 MG/DL — SIGNIFICANT CHANGE UP (ref 0.5–1.3)
CREAT SERPL-MCNC: 0.81 MG/DL — SIGNIFICANT CHANGE UP (ref 0.5–1.3)
EGFR: 72 ML/MIN/1.73M2 — SIGNIFICANT CHANGE UP
EGFR: 72 ML/MIN/1.73M2 — SIGNIFICANT CHANGE UP
EOSINOPHIL # BLD AUTO: 0.04 K/UL — SIGNIFICANT CHANGE UP (ref 0–0.5)
EOSINOPHIL # BLD AUTO: 0.04 K/UL — SIGNIFICANT CHANGE UP (ref 0–0.5)
EOSINOPHIL NFR BLD AUTO: 0.7 % — SIGNIFICANT CHANGE UP (ref 0–6)
EOSINOPHIL NFR BLD AUTO: 0.7 % — SIGNIFICANT CHANGE UP (ref 0–6)
FOLATE SERPL-MCNC: 17.6 NG/ML — HIGH (ref 3.1–17.5)
FOLATE SERPL-MCNC: 17.6 NG/ML — HIGH (ref 3.1–17.5)
GLUCOSE SERPL-MCNC: 81 MG/DL — SIGNIFICANT CHANGE UP (ref 70–99)
GLUCOSE SERPL-MCNC: 81 MG/DL — SIGNIFICANT CHANGE UP (ref 70–99)
HCT VFR BLD CALC: 38.2 % — SIGNIFICANT CHANGE UP (ref 34.5–45)
HCT VFR BLD CALC: 38.2 % — SIGNIFICANT CHANGE UP (ref 34.5–45)
HGB BLD-MCNC: 12.3 G/DL — SIGNIFICANT CHANGE UP (ref 11.5–15.5)
HGB BLD-MCNC: 12.3 G/DL — SIGNIFICANT CHANGE UP (ref 11.5–15.5)
IANC: 4.5 K/UL — SIGNIFICANT CHANGE UP (ref 1.8–7.4)
IANC: 4.5 K/UL — SIGNIFICANT CHANGE UP (ref 1.8–7.4)
IMM GRANULOCYTES NFR BLD AUTO: 0.2 % — SIGNIFICANT CHANGE UP (ref 0–0.9)
IMM GRANULOCYTES NFR BLD AUTO: 0.2 % — SIGNIFICANT CHANGE UP (ref 0–0.9)
LYMPHOCYTES # BLD AUTO: 1.02 K/UL — SIGNIFICANT CHANGE UP (ref 1–3.3)
LYMPHOCYTES # BLD AUTO: 1.02 K/UL — SIGNIFICANT CHANGE UP (ref 1–3.3)
LYMPHOCYTES # BLD AUTO: 16.8 % — SIGNIFICANT CHANGE UP (ref 13–44)
LYMPHOCYTES # BLD AUTO: 16.8 % — SIGNIFICANT CHANGE UP (ref 13–44)
MCHC RBC-ENTMCNC: 29.9 PG — SIGNIFICANT CHANGE UP (ref 27–34)
MCHC RBC-ENTMCNC: 29.9 PG — SIGNIFICANT CHANGE UP (ref 27–34)
MCHC RBC-ENTMCNC: 32.2 GM/DL — SIGNIFICANT CHANGE UP (ref 32–36)
MCHC RBC-ENTMCNC: 32.2 GM/DL — SIGNIFICANT CHANGE UP (ref 32–36)
MCV RBC AUTO: 92.9 FL — SIGNIFICANT CHANGE UP (ref 80–100)
MCV RBC AUTO: 92.9 FL — SIGNIFICANT CHANGE UP (ref 80–100)
MONOCYTES # BLD AUTO: 0.47 K/UL — SIGNIFICANT CHANGE UP (ref 0–0.9)
MONOCYTES # BLD AUTO: 0.47 K/UL — SIGNIFICANT CHANGE UP (ref 0–0.9)
MONOCYTES NFR BLD AUTO: 7.7 % — SIGNIFICANT CHANGE UP (ref 2–14)
MONOCYTES NFR BLD AUTO: 7.7 % — SIGNIFICANT CHANGE UP (ref 2–14)
NEUTROPHILS # BLD AUTO: 4.5 K/UL — SIGNIFICANT CHANGE UP (ref 1.8–7.4)
NEUTROPHILS # BLD AUTO: 4.5 K/UL — SIGNIFICANT CHANGE UP (ref 1.8–7.4)
NEUTROPHILS NFR BLD AUTO: 74.1 % — SIGNIFICANT CHANGE UP (ref 43–77)
NEUTROPHILS NFR BLD AUTO: 74.1 % — SIGNIFICANT CHANGE UP (ref 43–77)
NRBC # BLD: 0 /100 WBCS — SIGNIFICANT CHANGE UP (ref 0–0)
NRBC # BLD: 0 /100 WBCS — SIGNIFICANT CHANGE UP (ref 0–0)
NRBC # FLD: 0 K/UL — SIGNIFICANT CHANGE UP (ref 0–0)
NRBC # FLD: 0 K/UL — SIGNIFICANT CHANGE UP (ref 0–0)
PLATELET # BLD AUTO: 167 K/UL — SIGNIFICANT CHANGE UP (ref 150–400)
PLATELET # BLD AUTO: 167 K/UL — SIGNIFICANT CHANGE UP (ref 150–400)
POTASSIUM SERPL-MCNC: 4.3 MMOL/L — SIGNIFICANT CHANGE UP (ref 3.5–5.3)
POTASSIUM SERPL-MCNC: 4.3 MMOL/L — SIGNIFICANT CHANGE UP (ref 3.5–5.3)
POTASSIUM SERPL-SCNC: 4.3 MMOL/L — SIGNIFICANT CHANGE UP (ref 3.5–5.3)
POTASSIUM SERPL-SCNC: 4.3 MMOL/L — SIGNIFICANT CHANGE UP (ref 3.5–5.3)
RBC # BLD: 4.11 M/UL — SIGNIFICANT CHANGE UP (ref 3.8–5.2)
RBC # BLD: 4.11 M/UL — SIGNIFICANT CHANGE UP (ref 3.8–5.2)
RBC # FLD: 13.2 % — SIGNIFICANT CHANGE UP (ref 10.3–14.5)
RBC # FLD: 13.2 % — SIGNIFICANT CHANGE UP (ref 10.3–14.5)
SODIUM SERPL-SCNC: 142 MMOL/L — SIGNIFICANT CHANGE UP (ref 135–145)
SODIUM SERPL-SCNC: 142 MMOL/L — SIGNIFICANT CHANGE UP (ref 135–145)
T PALLIDUM AB TITR SER: NEGATIVE — SIGNIFICANT CHANGE UP
T PALLIDUM AB TITR SER: NEGATIVE — SIGNIFICANT CHANGE UP
TSH SERPL-MCNC: 0.81 UIU/ML — SIGNIFICANT CHANGE UP (ref 0.27–4.2)
TSH SERPL-MCNC: 0.81 UIU/ML — SIGNIFICANT CHANGE UP (ref 0.27–4.2)
VIT B12 SERPL-MCNC: 541 PG/ML — SIGNIFICANT CHANGE UP (ref 200–900)
VIT B12 SERPL-MCNC: 541 PG/ML — SIGNIFICANT CHANGE UP (ref 200–900)
WBC # BLD: 6.07 K/UL — SIGNIFICANT CHANGE UP (ref 3.8–10.5)
WBC # BLD: 6.07 K/UL — SIGNIFICANT CHANGE UP (ref 3.8–10.5)
WBC # FLD AUTO: 6.07 K/UL — SIGNIFICANT CHANGE UP (ref 3.8–10.5)
WBC # FLD AUTO: 6.07 K/UL — SIGNIFICANT CHANGE UP (ref 3.8–10.5)

## 2024-01-03 NOTE — PROGRESS NOTE ADULT - SUBJECTIVE AND OBJECTIVE BOX
Patient is a 83y old  Female who presents with a chief complaint of confusion   1/3/24      SUBJECTIVE / OVERNIGHT EVENTS:pt seen and examined    MEDICATIONS  (STANDING):    MEDICATIONS  (PRN):  acetaminophen     Tablet .. 650 milliGRAM(s) Oral every 6 hours PRN Temp greater or equal to 38C (100.4F), Mild Pain (1 - 3)    Vital Signs Last 24 Hrs  T(C): 36.7 (01-03-24 @ 11:33), Max: 36.9 (01-03-24 @ 05:00)  T(F): 98 (01-03-24 @ 11:33), Max: 98.5 (01-03-24 @ 05:00)  HR: 97 (01-03-24 @ 11:33) (58 - 97)  BP: 105/80 (01-03-24 @ 11:33) (104/62 - 110/50)  BP(mean): 62 (01-03-24 @ 05:00) (62 - 62)  RR: 18 (01-03-24 @ 11:33) (17 - 18)  SpO2: 100% (01-03-24 @ 11:33) (99% - 100%)            PHYSICAL EXAM:  GENERAL: NAD  NECK: Supple, No JVD  CHEST/LUNG: dec breath sounds at bases  HEART: Regular rate and rhythm; No murmurs, rubs, or gallops  ABDOMEN: Soft, Nontender, Nondistended; Bowel sounds present  EXTREMITIES:   No edema  NEUROLOGY: AAO X 3    LABS:  01-03    142  |  107  |  18  ----------------------------<  81  4.3   |  24  |  0.81    Ca    9.0      03 Jan 2024 05:20      Creatinine Trend: 0.81 <--, 0.77 <--, 0.86 <--, 0.82 <--, 0.74 <--, 0.83 <--                        12.3   6.07  )-----------( 167      ( 03 Jan 2024 05:20 )             38.2     Urine Studies:  Urinalysis Basic - ( 03 Jan 2024 05:20 )    Color:  / Appearance:  / SG:  / pH:   Gluc: 81 mg/dL / Ketone:   / Bili:  / Urobili:    Blood:  / Protein:  / Nitrite:    Leuk Esterase:  / RBC:  / WBC    Sq Epi:  / Non Sq Epi:  / Bacteria:                                             RADIOLOGY & ADDITIONAL TESTS:    Imaging Personally Reviewed:yes    Consultant(s) Notes Reviewed:  yes    Care Discussed with Consultants/Other Providers:yes

## 2024-01-03 NOTE — PROGRESS NOTE ADULT - ASSESSMENT
84 y/o F with pmh of possible dementia p/w confusion, found wandering in the grocery store confused and brought to ED for eval. Found to have UTI, on Ceftriaxone  Currently confused, does not know where she is, witnessed roaming hallways trying to get into others rooms.   Labs wnl, slight leukocytosis that has since normalized  CTH with age related volume loss, no acute pathology \  B12 541. Folate 17.6, RPR neg    Encephalopathy likely due to UTI superimposed on dementia    Plan:  - CTh reviewed  - check  TSH  - continue treatment of UTI  - delirium precautions:   [] please have adequate sleeping environment for the patient, light on, curtains open, TV on during the day with regular stimulation and out of bed to chair if possible. During the night, close curtains, TV off, lights off, and minimize interruptions (limit blood draws and vital sign checks if possible). Regular interaction with patient with staff (introducing selves), frequent reorientation, and encouragement of visitors as allowed by hospital and unit policy. Regular toileting.  [] limitation of sedating medications as tolerated  [] maintain electrolytes within normal limits   - appreciate ID/psych recs  - PT/OT  - DVT ppx   82 y/o F with pmh of possible dementia p/w confusion, found wandering in the grocery store confused and brought to ED for eval. Found to have UTI, on Ceftriaxone  Currently confused, does not know where she is, witnessed roaming hallways trying to get into others rooms.   Labs wnl, slight leukocytosis that has since normalized  CTH with age related volume loss, no acute pathology \  B12 541. Folate 17.6, RPR neg    Encephalopathy likely due to UTI superimposed on dementia    Plan:  - CTh reviewed  - check  TSH  - continue treatment of UTI  - delirium precautions:   [] please have adequate sleeping environment for the patient, light on, curtains open, TV on during the day with regular stimulation and out of bed to chair if possible. During the night, close curtains, TV off, lights off, and minimize interruptions (limit blood draws and vital sign checks if possible). Regular interaction with patient with staff (introducing selves), frequent reorientation, and encouragement of visitors as allowed by hospital and unit policy. Regular toileting.  [] limitation of sedating medications as tolerated  [] maintain electrolytes within normal limits   - appreciate ID/psych recs  - PT/OT  - DVT ppx

## 2024-01-03 NOTE — PROGRESS NOTE ADULT - SUBJECTIVE AND OBJECTIVE BOX
Neurology Progress Note    S: Patient seen and examined. No new events overnight. patient denied CP, SOB, HA or pain.     Medication:  acetaminophen     Tablet .. 650 milliGRAM(s) Oral every 6 hours PRN      Vitals:  Vital Signs Last 24 Hrs  T(C): 36.7 (03 Jan 2024 11:33), Max: 36.9 (03 Jan 2024 05:00)  T(F): 98 (03 Jan 2024 11:33), Max: 98.5 (03 Jan 2024 05:00)  HR: 97 (03 Jan 2024 11:33) (58 - 97)  BP: 105/80 (03 Jan 2024 11:33) (104/62 - 110/50)  BP(mean): 62 (03 Jan 2024 05:00) (62 - 62)  RR: 18 (03 Jan 2024 11:33) (17 - 18)  SpO2: 100% (03 Jan 2024 11:33) (99% - 100%)    Parameters below as of 03 Jan 2024 11:33  Patient On (Oxygen Delivery Method): room air        General Exam:   General Appearance: Appropriately dressed and in no acute distress       Head: Normocephalic, atraumatic and no dysmorphic features  Ear, Nose, and Throat: Moist mucous membranes  CVS: S1S2+  Resp: No SOB, no wheeze or rhonchi  Abd: soft NTND  Extremities: No edema, no cyanosis  Skin: No bruises, no rashes     Neurological Exam:  Mental Status: Awake, alert and oriented x 1-2.  Able to follow simple commands. Able to name and repeat. fluent speech. No obvious aphasia or dysarthria noted.   Cranial Nerves: PERRL, EOMI, VFFC, sensation V1-V3 intact,  no obvious facial asymmetry, equal elevation of palate, scm/trap 5/5, tongue is midline on protrusion. hearing is grossly intact.   Motor: Normal bulk, tone and strength throughout. Fine finger movements were intact and symmetric. no tremors or drift noted.    Sensation: Intact to light touch and pinprick throughout. no right/left confusion. no extinction to tactile on DSS.   Reflexes: 1+ throughout at biceps, brachioradialis, triceps, patellars and ankles bilaterally and equal. No clonus. R toe and L toe were both downgoing.  Coordination: No dysmetria on FNF  Gait: Narrow based and steady.       I personally reviewed the below data/images/labs:      CBC Full  -  ( 03 Jan 2024 05:20 )  WBC Count : 6.07 K/uL  RBC Count : 4.11 M/uL  Hemoglobin : 12.3 g/dL  Hematocrit : 38.2 %  Platelet Count - Automated : 167 K/uL  Mean Cell Volume : 92.9 fL  Mean Cell Hemoglobin : 29.9 pg  Mean Cell Hemoglobin Concentration : 32.2 gm/dL  Auto Neutrophil # : 4.50 K/uL  Auto Lymphocyte # : 1.02 K/uL  Auto Monocyte # : 0.47 K/uL  Auto Eosinophil # : 0.04 K/uL  Auto Basophil # : 0.03 K/uL  Auto Neutrophil % : 74.1 %  Auto Lymphocyte % : 16.8 %  Auto Monocyte % : 7.7 %  Auto Eosinophil % : 0.7 %  Auto Basophil % : 0.5 %    01-03    142  |  107  |  18  ----------------------------<  81  4.3   |  24  |  0.81    Ca    9.0      03 Jan 2024 05:20          Urinalysis Basic - ( 03 Jan 2024 05:20 )    Color: x / Appearance: x / SG: x / pH: x  Gluc: 81 mg/dL / Ketone: x  / Bili: x / Urobili: x   Blood: x / Protein: x / Nitrite: x   Leuk Esterase: x / RBC: x / WBC x   Sq Epi: x / Non Sq Epi: x / Bacteria: x      < from: CT Head No Cont (01.02.24 @ 17:45) >    ACC: [id number] EXAM:  CT BRAIN   ORDERED BY: MARANDA HAM     PROCEDURE DATE:  01/02/2024          INTERPRETATION:  .    CLINICAL INFORMATION: Confusion. Altered mental status. Dementia.    TECHNIQUE: Multiple axial CT images of the head were obtained without   contrast. Sagittal and coronal reconstructed images were acquired from   the source data.    COMPARISON: No prior CT studies of the brain are available for comparison   at this institution.    FINDINGS: There is no acute intracranial hemorrhage, mass effect, shift   of the midline structures, herniation, extra-axial fluid collection, or   hydrocephalus.    There is diffuse cerebral volume loss with prominence of the sulci,   fissures, and cisternal spaces which is normal for the patient's age.   Moderate ventriculomegaly is seen. 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    The paranasal sinuses and tympanomastoid cavities are clear. The   calvarium is intact. There is evidence of left-sided cataract removal.    IMPRESSION: No acute intracranial hemorrhage, mass effect, or shift of   the midline structures.    < end of copied text >   Neurology Progress Note    S: Patient seen and examined. No new events overnight. patient denied CP, SOB, HA or pain.     Medication:  acetaminophen     Tablet .. 650 milliGRAM(s) Oral every 6 hours PRN      Vitals:  Vital Signs Last 24 Hrs  T(C): 36.7 (03 Jan 2024 11:33), Max: 36.9 (03 Jan 2024 05:00)  T(F): 98 (03 Jan 2024 11:33), Max: 98.5 (03 Jan 2024 05:00)  HR: 97 (03 Jan 2024 11:33) (58 - 97)  BP: 105/80 (03 Jan 2024 11:33) (104/62 - 110/50)  BP(mean): 62 (03 Jan 2024 05:00) (62 - 62)  RR: 18 (03 Jan 2024 11:33) (17 - 18)  SpO2: 100% (03 Jan 2024 11:33) (99% - 100%)    Parameters below as of 03 Jan 2024 11:33  Patient On (Oxygen Delivery Method): room air        General Exam:   General Appearance: Appropriately dressed and in no acute distress       Head: Normocephalic, atraumatic and no dysmorphic features  Ear, Nose, and Throat: Moist mucous membranes  CVS: S1S2+  Resp: No SOB, no wheeze or rhonchi  Abd: soft NTND  Extremities: No edema, no cyanosis  Skin: No bruises, no rashes     Neurological Exam:  Mental Status: Awake, alert and oriented x 1-2.  Able to follow simple commands. Able to name and repeat. fluent speech. No obvious aphasia or dysarthria noted.   Cranial Nerves: PERRL, EOMI, VFFC, sensation V1-V3 intact,  no obvious facial asymmetry, equal elevation of palate, scm/trap 5/5, tongue is midline on protrusion. hearing is grossly intact.   Motor: Normal bulk, tone and strength throughout. Fine finger movements were intact and symmetric. no tremors or drift noted.    Sensation: Intact to light touch and pinprick throughout. no right/left confusion. no extinction to tactile on DSS.   Reflexes: 1+ throughout at biceps, brachioradialis, triceps, patellars and ankles bilaterally and equal. No clonus. R toe and L toe were both downgoing.  Coordination: No dysmetria on FNF  Gait: Narrow based and steady.       I personally reviewed the below data/images/labs:      CBC Full  -  ( 03 Jan 2024 05:20 )  WBC Count : 6.07 K/uL  RBC Count : 4.11 M/uL  Hemoglobin : 12.3 g/dL  Hematocrit : 38.2 %  Platelet Count - Automated : 167 K/uL  Mean Cell Volume : 92.9 fL  Mean Cell Hemoglobin : 29.9 pg  Mean Cell Hemoglobin Concentration : 32.2 gm/dL  Auto Neutrophil # : 4.50 K/uL  Auto Lymphocyte # : 1.02 K/uL  Auto Monocyte # : 0.47 K/uL  Auto Eosinophil # : 0.04 K/uL  Auto Basophil # : 0.03 K/uL  Auto Neutrophil % : 74.1 %  Auto Lymphocyte % : 16.8 %  Auto Monocyte % : 7.7 %  Auto Eosinophil % : 0.7 %  Auto Basophil % : 0.5 %    01-03    142  |  107  |  18  ----------------------------<  81  4.3   |  24  |  0.81    Ca    9.0      03 Jan 2024 05:20          Urinalysis Basic - ( 03 Jan 2024 05:20 )    Color: x / Appearance: x / SG: x / pH: x  Gluc: 81 mg/dL / Ketone: x  / Bili: x / Urobili: x   Blood: x / Protein: x / Nitrite: x   Leuk Esterase: x / RBC: x / WBC x   Sq Epi: x / Non Sq Epi: x / Bacteria: x      < from: CT Head No Cont (01.02.24 @ 17:45) >    ACC: [id number] EXAM:  CT BRAIN   ORDERED BY: MARANDA HAM     PROCEDURE DATE:  01/02/2024          INTERPRETATION:  .    CLINICAL INFORMATION: Confusion. Altered mental status. Dementia.    TECHNIQUE: Multiple axial CT images of the head were obtained without   contrast. Sagittal and coronal reconstructed images were acquired from   the source data.    COMPARISON: No prior CT studies of the brain are available for comparison   at this institution.    FINDINGS: There is no acute intracranial hemorrhage, mass effect, shift   of the midline structures, herniation, extra-axial fluid collection, or   hydrocephalus.    There is diffuse cerebral volume loss with prominence of the sulci,   fissures, and cisternal spaces which is normal for the patient's age.   Moderate ventriculomegaly is seen. 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    The paranasal sinuses and tympanomastoid cavities are clear. The   calvarium is intact. There is evidence of left-sided cataract removal.    IMPRESSION: No acute intracranial hemorrhage, mass effect, or shift of   the midline structures.    < end of copied text >

## 2024-01-04 LAB
CULTURE RESULTS: SIGNIFICANT CHANGE UP
SPECIMEN SOURCE: SIGNIFICANT CHANGE UP

## 2024-01-04 RX ORDER — OLANZAPINE 15 MG/1
1.25 TABLET, FILM COATED ORAL ONCE
Refills: 0 | Status: COMPLETED | OUTPATIENT
Start: 2024-01-04 | End: 2024-01-04

## 2024-01-04 RX ORDER — HALOPERIDOL DECANOATE 100 MG/ML
2.5 INJECTION INTRAMUSCULAR ONCE
Refills: 0 | Status: COMPLETED | OUTPATIENT
Start: 2024-01-04 | End: 2024-01-04

## 2024-01-04 RX ORDER — LANOLIN ALCOHOL/MO/W.PET/CERES
3 CREAM (GRAM) TOPICAL AT BEDTIME
Refills: 0 | Status: DISCONTINUED | OUTPATIENT
Start: 2024-01-04 | End: 2024-03-07

## 2024-01-04 RX ADMIN — OLANZAPINE 1.25 MILLIGRAM(S): 15 TABLET, FILM COATED ORAL at 04:50

## 2024-01-04 RX ADMIN — Medication 3 MILLIGRAM(S): at 21:35

## 2024-01-04 RX ADMIN — OLANZAPINE 1.25 MILLIGRAM(S): 15 TABLET, FILM COATED ORAL at 03:03

## 2024-01-04 RX ADMIN — OLANZAPINE 1.25 MILLIGRAM(S): 15 TABLET, FILM COATED ORAL at 21:35

## 2024-01-04 RX ADMIN — HALOPERIDOL DECANOATE 2.5 MILLIGRAM(S): 100 INJECTION INTRAMUSCULAR at 23:55

## 2024-01-04 NOTE — PROGRESS NOTE ADULT - SUBJECTIVE AND OBJECTIVE BOX
Patient is a 83y old  Female who presents with a chief complaint of confusion   14/24      SUBJECTIVE / OVERNIGHT EVENTS:pt seen and examined    MEDICATIONS  (STANDING):  melatonin 3 milliGRAM(s) Oral at bedtime    MEDICATIONS  (PRN):  acetaminophen     Tablet .. 650 milliGRAM(s) Oral every 6 hours PRN Temp greater or equal to 38C (100.4F), Mild Pain (1 - 3)    Vital Signs Last 24 Hrs  T(C): 36.8 (01-04-24 @ 11:31), Max: 36.9 (01-04-24 @ 05:00)  T(F): 98.3 (01-04-24 @ 11:31), Max: 98.4 (01-04-24 @ 05:00)  HR: 72 (01-04-24 @ 11:31) (65 - 98)  BP: 148/66 (01-04-24 @ 11:31) (111/62 - 148/66)  BP(mean): --  RR: 18 (01-04-24 @ 11:31) (17 - 18)  SpO2: 96% (01-04-24 @ 11:31) (96% - 100%)            PHYSICAL EXAM:  GENERAL: NAD  NECK: Supple, No JVD  CHEST/LUNG: dec breath sounds at bases  HEART: Regular rate and rhythm; No murmurs, rubs, or gallops  ABDOMEN: Soft, Nontender, Nondistended; Bowel sounds present  EXTREMITIES:   No edema  NEUROLOGY: Alert awake confused    LABS:  01-03    142  |  107  |  18  ----------------------------<  81  4.3   |  24  |  0.81    Ca    9.0      03 Jan 2024 05:20      Creatinine Trend: 0.81 <--, 0.77 <--, 0.86 <--, 0.82 <--, 0.74 <--, 0.83 <--                        12.3   6.07  )-----------( 167      ( 03 Jan 2024 05:20 )             38.2     Urine Studies:  Urinalysis Basic - ( 03 Jan 2024 05:20 )    Color:  / Appearance:  / SG:  / pH:   Gluc: 81 mg/dL / Ketone:   / Bili:  / Urobili:    Blood:  / Protein:  / Nitrite:    Leuk Esterase:  / RBC:  / WBC    Sq Epi:  / Non Sq Epi:  / Bacteria:                                                             RADIOLOGY & ADDITIONAL TESTS:    Imaging Personally Reviewed:yes    Consultant(s) Notes Reviewed:  yes    Care Discussed with Consultants/Other Providers:yes

## 2024-01-05 PROCEDURE — 99231 SBSQ HOSP IP/OBS SF/LOW 25: CPT

## 2024-01-05 RX ORDER — OLANZAPINE 15 MG/1
1.25 TABLET, FILM COATED ORAL EVERY 6 HOURS
Refills: 0 | Status: DISCONTINUED | OUTPATIENT
Start: 2024-01-05 | End: 2024-03-07

## 2024-01-05 RX ORDER — QUETIAPINE FUMARATE 200 MG/1
25 TABLET, FILM COATED ORAL
Refills: 0 | Status: DISCONTINUED | OUTPATIENT
Start: 2024-01-05 | End: 2024-01-24

## 2024-01-05 RX ADMIN — QUETIAPINE FUMARATE 25 MILLIGRAM(S): 200 TABLET, FILM COATED ORAL at 21:50

## 2024-01-05 RX ADMIN — Medication 3 MILLIGRAM(S): at 21:50

## 2024-01-05 NOTE — BH CONSULTATION LIAISON PROGRESS NOTE - NSBHASSESSMENTFT_PSY_ALL_CORE
84 y/o F with PMH of possible dementia p/w confusion.  Pt was found by bystander (who is a nurse) wandering by a local grocery store and appeared confused.  Bystander brought pt to her home and noted pt's home was in a poor state, and did not seem safe for pt to live alone.  Pt brought to ED by EMS.     On evaluation patient is alert, oriented X1, calm but confused, needed a lot of redirection but was not agitated. in the ED patient found to have UTI and antibiotic was started. with no know past psychiatric history and her history of infection it is most likely to be delirium with underlying dementia. currently patient is calm, did not need any PRNs for the last 2 days.     1/5: Patient orientedX1, confused, not sleeping and getting agitated at night where she requires PRNs.     Plan:   - Routine observation   - Medical management per primary team   - START Seroquel 25 mg PO at 8 pm.   - For agitation can use Olanzapine 1.25 mg PRN Q6hr. Monitor QTC    Dispo: No role for inpatient psychiatry admission but patient might benefit from higher level of dispo like a nursing home.

## 2024-01-05 NOTE — BH CONSULTATION LIAISON PROGRESS NOTE - NSICDXBHPRIMARYDX_PSY_ALL_CORE
Senile dementia with delirium, with behavioral disturbance   F03.913   Senile dementia with delirium, with behavioral disturbance   F03.911   Dementia with behavioral problem   F03.911   Dementia with behavioral problem   F03.913

## 2024-01-05 NOTE — BH CONSULTATION LIAISON PROGRESS NOTE - NSBHATTESTBILLING_PSY_A_CORE
[id number]-Ihgqzfxqhx OBS or IP - low complexity OR 25-34 mins 61027-Iikvtbgvak OBS or IP - low complexity OR 25-34 mins

## 2024-01-05 NOTE — PROGRESS NOTE ADULT - SUBJECTIVE AND OBJECTIVE BOX
Patient is a 83y old  Female who presents with a chief complaint of confusion   1/5/24      SUBJECTIVE / OVERNIGHT EVENTS:pt seen and examined    MEDICATIONS  (STANDING):  melatonin 3 milliGRAM(s) Oral at bedtime    MEDICATIONS  (PRN):  acetaminophen     Tablet .. 650 milliGRAM(s) Oral every 6 hours PRN Temp greater or equal to 38C (100.4F), Mild Pain (1 - 3)    Vital Signs Last 24 Hrs  T(C): 36.8 (01-04-24 @ 11:31), Max: 36.9 (01-04-24 @ 05:00)  T(F): 98.3 (01-04-24 @ 11:31), Max: 98.4 (01-04-24 @ 05:00)  HR: 72 (01-04-24 @ 11:31) (65 - 98)  BP: 148/66 (01-04-24 @ 11:31) (111/62 - 148/66)  BP(mean): --  RR: 18 (01-04-24 @ 11:31) (17 - 18)  SpO2: 96% (01-04-24 @ 11:31) (96% - 100%)            PHYSICAL EXAM:  GENERAL: NAD  NECK: Supple, No JVD  CHEST/LUNG: dec breath sounds at bases  HEART: Regular rate and rhythm; No murmurs, rubs, or gallops  ABDOMEN: Soft, Nontender, Nondistended; Bowel sounds present  EXTREMITIES:   No edema  NEUROLOGY: Alert awake confused    LABS:        Creatinine Trend: 0.81 <--, 0.77 <--, 0.86 <--, 0.82 <--, 0.74 <--    Urine Studies:  Urinalysis Basic - ( 03 Jan 2024 05:20 )    Color:  / Appearance:  / SG:  / pH:   Gluc: 81 mg/dL / Ketone:   / Bili:  / Urobili:    Blood:  / Protein:  / Nitrite:    Leuk Esterase:  / RBC:  / WBC    Sq Epi:  / Non Sq Epi:  / Bacteria:                                                                           RADIOLOGY & ADDITIONAL TESTS:    Imaging Personally Reviewed:yes    Consultant(s) Notes Reviewed:  yes    Care Discussed with Consultants/Other Providers:yes

## 2024-01-05 NOTE — BH CONSULTATION LIAISON PROGRESS NOTE - NSBHFUPINTERVALHXFT_PSY_A_CORE
Chart reviewed. Patient received 1.25 mg Olanzapine for agitation last night. Per primary team she has not been sleeping, wondering in the hallway and entering patient room and getting agitated when redirected. Patient seen this morning at the bedside. She is alert and orientedX1. confused but has no complains other that worrying about her dog.

## 2024-01-05 NOTE — PROGRESS NOTE ADULT - SUBJECTIVE AND OBJECTIVE BOX
Neurology Progress Note    S: Patient seen and examined. No new events overnight. patient denied CP, SOB, HA or pain.     Medications: MEDICATIONS  (STANDING):  melatonin 3 milliGRAM(s) Oral at bedtime    MEDICATIONS  (PRN):  acetaminophen     Tablet .. 650 milliGRAM(s) Oral every 6 hours PRN Temp greater or equal to 38C (100.4F), Mild Pain (1 - 3)       Vitals:  Vital Signs Last 24 Hrs  T(C): 36.6 (05 Jan 2024 05:00), Max: 36.8 (04 Jan 2024 11:31)  T(F): 97.9 (05 Jan 2024 05:00), Max: 98.3 (04 Jan 2024 11:31)  HR: 70 (05 Jan 2024 05:00) (64 - 72)  BP: 118/74 (05 Jan 2024 05:00) (118/74 - 148/66)  BP(mean): --  RR: 16 (05 Jan 2024 05:00) (16 - 18)  SpO2: 99% (05 Jan 2024 05:00) (96% - 100%)    Parameters below as of 05 Jan 2024 05:00  Patient On (Oxygen Delivery Method): room air            General Exam:   General Appearance: Appropriately dressed and in no acute distress       Head: Normocephalic, atraumatic and no dysmorphic features  Ear, Nose, and Throat: Moist mucous membranes  CVS: S1S2+  Resp: No SOB, no wheeze or rhonchi  Abd: soft NTND  Extremities: No edema, no cyanosis  Skin: No bruises, no rashes     Neurological Exam:  Mental Status: Awake, alert and oriented x 1-2.  Able to follow simple commands. Able to name and repeat. fluent speech. No obvious aphasia or dysarthria noted.   Cranial Nerves: PERRL, EOMI, VFFC, sensation V1-V3 intact,  no obvious facial asymmetry, equal elevation of palate, scm/trap 5/5, tongue is midline on protrusion. hearing is grossly intact.   Motor: Normal bulk, tone and strength throughout. Fine finger movements were intact and symmetric. no tremors or drift noted.    Sensation: Intact to light touch and pinprick throughout. no right/left confusion. no extinction to tactile on DSS.   Reflexes: 1+ throughout at biceps, brachioradialis, triceps, patellars and ankles bilaterally and equal. No clonus. R toe and L toe were both downgoing.  Coordination: No dysmetria on FNF  Gait: Narrow based and steady.       I personally reviewed the below data/images/labs:      LABS:                      < from: CT Head No Cont (01.02.24 @ 17:45) >    ACC: [id number] EXAM:  CT BRAIN   ORDERED BY: MARANDA HAM     PROCEDURE DATE:  01/02/2024          INTERPRETATION:  .    CLINICAL INFORMATION: Confusion. Altered mental status. Dementia.    TECHNIQUE: Multiple axial CT images of the head were obtained without   contrast. Sagittal and coronal reconstructed images were acquired from   the source data.    COMPARISON: No prior CT studies of the brain are available for comparison   at this institution.    FINDINGS: There is no acute intracranial hemorrhage, mass effect, shift   of the midline structures, herniation, extra-axial fluid collection, or   hydrocephalus.    There is diffuse cerebral volume loss with prominence of the sulci,   fissures, and cisternal spaces which is normal for the patient's age.   Moderate ventriculomegaly is seen. 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    The paranasal sinuses and tympanomastoid cavities are clear. The   calvarium is intact. There is evidence of left-sided cataract removal.    IMPRESSION: No acute intracranial hemorrhage, mass effect, or shift of   the midline structures.    < end of copied text >   Neurology Progress Note    S: Patient seen and examined. No new events overnight. patient denied CP, SOB, HA or pain.     Medications: MEDICATIONS  (STANDING):  melatonin 3 milliGRAM(s) Oral at bedtime    MEDICATIONS  (PRN):  acetaminophen     Tablet .. 650 milliGRAM(s) Oral every 6 hours PRN Temp greater or equal to 38C (100.4F), Mild Pain (1 - 3)       Vitals:  Vital Signs Last 24 Hrs  T(C): 36.6 (05 Jan 2024 05:00), Max: 36.8 (04 Jan 2024 11:31)  T(F): 97.9 (05 Jan 2024 05:00), Max: 98.3 (04 Jan 2024 11:31)  HR: 70 (05 Jan 2024 05:00) (64 - 72)  BP: 118/74 (05 Jan 2024 05:00) (118/74 - 148/66)  BP(mean): --  RR: 16 (05 Jan 2024 05:00) (16 - 18)  SpO2: 99% (05 Jan 2024 05:00) (96% - 100%)    Parameters below as of 05 Jan 2024 05:00  Patient On (Oxygen Delivery Method): room air            General Exam:   General Appearance: Appropriately dressed and in no acute distress       Head: Normocephalic, atraumatic and no dysmorphic features  Ear, Nose, and Throat: Moist mucous membranes  CVS: S1S2+  Resp: No SOB, no wheeze or rhonchi  Abd: soft NTND  Extremities: No edema, no cyanosis  Skin: No bruises, no rashes     Neurological Exam:  Mental Status: Awake, alert and oriented x 1-2.  Able to follow simple commands. Able to name and repeat. fluent speech. No obvious aphasia or dysarthria noted.   Cranial Nerves: PERRL, EOMI, VFFC, sensation V1-V3 intact,  no obvious facial asymmetry, equal elevation of palate, scm/trap 5/5, tongue is midline on protrusion. hearing is grossly intact.   Motor: Normal bulk, tone and strength throughout. Fine finger movements were intact and symmetric. no tremors or drift noted.    Sensation: Intact to light touch and pinprick throughout. no right/left confusion. no extinction to tactile on DSS.   Reflexes: 1+ throughout at biceps, brachioradialis, triceps, patellars and ankles bilaterally and equal. No clonus. R toe and L toe were both downgoing.  Coordination: No dysmetria on FNF  Gait: Narrow based and steady.       I personally reviewed the below data/images/labs:      LABS:                      < from: CT Head No Cont (01.02.24 @ 17:45) >    ACC: [id number] EXAM:  CT BRAIN   ORDERED BY: MARANDA HAM     PROCEDURE DATE:  01/02/2024          INTERPRETATION:  .    CLINICAL INFORMATION: Confusion. Altered mental status. Dementia.    TECHNIQUE: Multiple axial CT images of the head were obtained without   contrast. Sagittal and coronal reconstructed images were acquired from   the source data.    COMPARISON: No prior CT studies of the brain are available for comparison   at this institution.    FINDINGS: There is no acute intracranial hemorrhage, mass effect, shift   of the midline structures, herniation, extra-axial fluid collection, or   hydrocephalus.    There is diffuse cerebral volume loss with prominence of the sulci,   fissures, and cisternal spaces which is normal for the patient's age.   Moderate ventriculomegaly is seen. 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    The paranasal sinuses and tympanomastoid cavities are clear. The   calvarium is intact. There is evidence of left-sided cataract removal.    IMPRESSION: No acute intracranial hemorrhage, mass effect, or shift of   the midline structures.    < end of copied text >

## 2024-01-06 RX ADMIN — Medication 3 MILLIGRAM(S): at 21:30

## 2024-01-06 RX ADMIN — QUETIAPINE FUMARATE 25 MILLIGRAM(S): 200 TABLET, FILM COATED ORAL at 21:30

## 2024-01-06 NOTE — PROGRESS NOTE ADULT - SUBJECTIVE AND OBJECTIVE BOX
Patient is a 83y old  Female who presents with a chief complaint of confusion   1/6/24      SUBJECTIVE / OVERNIGHT EVENTS:pt seen and examined    MEDICATIONS  (STANDING):  melatonin 3 milliGRAM(s) Oral at bedtime  QUEtiapine 25 milliGRAM(s) Oral <User Schedule>    MEDICATIONS  (PRN):  acetaminophen     Tablet .. 650 milliGRAM(s) Oral every 6 hours PRN Temp greater or equal to 38C (100.4F), Mild Pain (1 - 3)  OLANZapine Injectable 1.25 milliGRAM(s) IntraMuscular every 6 hours PRN agitation    Vital Signs Last 24 Hrs  T(C): 36.4 (01-06-24 @ 11:00), Max: 36.7 (01-05-24 @ 19:54)  T(F): 97.6 (01-06-24 @ 11:00), Max: 98 (01-05-24 @ 19:54)  HR: 66 (01-06-24 @ 11:00) (64 - 89)  BP: 136/57 (01-06-24 @ 11:00) (113/76 - 136/57)  BP(mean): --  RR: 18 (01-06-24 @ 11:00) (17 - 18)  SpO2: 99% (01-06-24 @ 11:00) (99% - 100%)            PHYSICAL EXAM:  GENERAL: NAD  NECK: Supple, No JVD  CHEST/LUNG: dec breath sounds at bases  HEART: Regular rate and rhythm; No murmurs, rubs, or gallops  ABDOMEN: Soft, Nontender, Nondistended; Bowel sounds present  EXTREMITIES:   No edema  NEUROLOGY: Alert awake confused    LABS:        Creatinine Trend: 0.81 <--, 0.77 <--, 0.86 <--, 0.82 <--    Urine Studies:  Urinalysis Basic - ( 03 Jan 2024 05:20 )    Color:  / Appearance:  / SG:  / pH:   Gluc: 81 mg/dL / Ketone:   / Bili:  / Urobili:    Blood:  / Protein:  / Nitrite:    Leuk Esterase:  / RBC:  / WBC    Sq Epi:  / Non Sq Epi:  / Bacteria:                                                                                         RADIOLOGY & ADDITIONAL TESTS:    Imaging Personally Reviewed:yes    Consultant(s) Notes Reviewed:  yes    Care Discussed with Consultants/Other Providers:yes

## 2024-01-07 PROCEDURE — 93010 ELECTROCARDIOGRAM REPORT: CPT

## 2024-01-07 RX ADMIN — QUETIAPINE FUMARATE 25 MILLIGRAM(S): 200 TABLET, FILM COATED ORAL at 21:37

## 2024-01-07 RX ADMIN — Medication 3 MILLIGRAM(S): at 21:37

## 2024-01-07 NOTE — PROGRESS NOTE ADULT - SUBJECTIVE AND OBJECTIVE BOX
Patient is a 83y old  Female who presents with a chief complaint of confusion   1/7/24      SUBJECTIVE / OVERNIGHT EVENTS:pt seen and examined    MEDICATIONS  (STANDING):  melatonin 3 milliGRAM(s) Oral at bedtime  QUEtiapine 25 milliGRAM(s) Oral <User Schedule>    MEDICATIONS  (PRN):  acetaminophen     Tablet .. 650 milliGRAM(s) Oral every 6 hours PRN Temp greater or equal to 38C (100.4F), Mild Pain (1 - 3)  OLANZapine Injectable 1.25 milliGRAM(s) IntraMuscular every 6 hours PRN agitation    Vital Signs Last 24 Hrs  T(C): 36.8 (01-07-24 @ 22:13), Max: 37.1 (01-07-24 @ 11:00)  T(F): 98.2 (01-07-24 @ 22:13), Max: 98.8 (01-07-24 @ 11:00)  HR: 86 (01-07-24 @ 22:13) (60 - 86)  BP: 119/49 (01-07-24 @ 22:13) (119/49 - 131/53)  BP(mean): --  RR: 18 (01-07-24 @ 22:13) (17 - 18)  SpO2: 96% (01-07-24 @ 22:13) (96% - 100%)                PHYSICAL EXAM:  GENERAL: NAD  NECK: Supple, No JVD  CHEST/LUNG: dec breath sounds at bases  HEART: Regular rate and rhythm; No murmurs, rubs, or gallops  ABDOMEN: Soft, Nontender, Nondistended; Bowel sounds present  EXTREMITIES:   No edema  NEUROLOGY: Alert awake confused    LABS:        Creatinine Trend: 0.81 <--, 0.77 <--, 0.86 <--    Urine Studies:  Urinalysis Basic - ( 03 Jan 2024 05:20 )    Color:  / Appearance:  / SG:  / pH:   Gluc: 81 mg/dL / Ketone:   / Bili:  / Urobili:    Blood:  / Protein:  / Nitrite:    Leuk Esterase:  / RBC:  / WBC    Sq Epi:  / Non Sq Epi:  / Bacteria:                                                                                                       RADIOLOGY & ADDITIONAL TESTS:    Imaging Personally Reviewed:yes    Consultant(s) Notes Reviewed:  yes    Care Discussed with Consultants/Other Providers:yes

## 2024-01-08 PROCEDURE — 99232 SBSQ HOSP IP/OBS MODERATE 35: CPT

## 2024-01-08 RX ADMIN — QUETIAPINE FUMARATE 25 MILLIGRAM(S): 200 TABLET, FILM COATED ORAL at 21:26

## 2024-01-08 RX ADMIN — Medication 3 MILLIGRAM(S): at 21:26

## 2024-01-08 NOTE — BH CONSULTATION LIAISON PROGRESS NOTE - NSBHFUPINTERVALHXFT_PSY_A_CORE
Chart reviewed. No overnight events. No PRNs. She is alert and orientedX1. confused but has no complains reports sleeping and eating well.  Chart reviewed. No overnight events. No PRNs. She is alert and orientedX1. confused but has no complaints reports sleeping and eating well.

## 2024-01-08 NOTE — BH CONSULTATION LIAISON PROGRESS NOTE - NSBHASSESSMENTFT_PSY_ALL_CORE
84 y/o F with PMH of possible dementia p/w confusion.  Pt was found by bystander (who is a nurse) wandering by a local grocery store and appeared confused.  Bystander brought pt to her home and noted pt's home was in a poor state, and did not seem safe for pt to live alone.  Pt brought to ED by EMS.     On evaluation patient is alert, oriented X1, calm but confused, needed a lot of redirection but was not agitated. in the ED patient found to have UTI and antibiotic was started. with no know past psychiatric history and her history of infection it is most likely to be delirium with underlying dementia. currently patient is calm, did not need any PRNs for the last 2 days.     1/5: Patient orientedX1, confused, not sleeping and getting agitated at night where she requires PRNs.   1/8: Patient is oriented x1. better sleep and no PRNS. Plan to continue Seroquel 25 mg at 8 pm.     Plan:   - Routine observation   - Medical management per primary team   - Continue Seroquel 25 mg PO at 8 pm.   - For agitation can use Olanzapine 1.25 mg PRN Q6hr. Monitor QTC    Dispo: No role for inpatient psychiatry admission but patient might benefit from higher level of dispo like a nursing home.

## 2024-01-08 NOTE — PROGRESS NOTE ADULT - ASSESSMENT
84 y/o F with pmh of possible dementia p/w confusion, found wandering in the grocery store confused and brought to ED for eval. Found to have UTI, on Ceftriaxone  Currently confused, does not know where she is, witnessed roaming hallways trying to get into others rooms.   Labs wnl, slight leukocytosis that has since normalized  CTH with age related volume loss, no acute pathology \  B12 541. Folate 17.6, RPR neg    Encephalopathy likely due to UTI superimposed on dementia    Plan:  - CTh reviewed  - check  TSH  - continue treatment of UTI  - delirium precautions:   [] please have adequate sleeping environment for the patient, light on, curtains open, TV on during the day with regular stimulation and out of bed to chair if possible. During the night, close curtains, TV off, lights off, and minimize interruptions (limit blood draws and vital sign checks if possible). Regular interaction with patient with staff (introducing selves), frequent reorientation, and encouragement of visitors as allowed by hospital and unit policy. Regular toileting.  [] limitation of sedating medications as tolerated  [] maintain electrolytes within normal limits   - appreciate ID/psych recs  - PT/OT  - DVT ppx

## 2024-01-08 NOTE — BH CONSULTATION LIAISON PROGRESS NOTE - NSBHCONSULTFOLLOWAFTERCARE_PSY_A_CORE FT
No role for inpatient psychiatry admission but warrants a safe discharge and patient might benefit from higher level of dispo like a nursing home.

## 2024-01-08 NOTE — PROGRESS NOTE ADULT - SUBJECTIVE AND OBJECTIVE BOX
Neurology Progress Note    S: Patient seen and examined. No new events overnight.     Medications: MEDICATIONS  (STANDING):  melatonin 3 milliGRAM(s) Oral at bedtime  QUEtiapine 25 milliGRAM(s) Oral <User Schedule>    MEDICATIONS  (PRN):  acetaminophen     Tablet .. 650 milliGRAM(s) Oral every 6 hours PRN Temp greater or equal to 38C (100.4F), Mild Pain (1 - 3)  OLANZapine Injectable 1.25 milliGRAM(s) IntraMuscular every 6 hours PRN agitation       Vitals:  Vital Signs Last 24 Hrs  T(C): 36.7 (08 Jan 2024 11:41), Max: 36.8 (07 Jan 2024 22:13)  T(F): 98 (08 Jan 2024 11:41), Max: 98.2 (07 Jan 2024 22:13)  HR: 59 (08 Jan 2024 11:41) (59 - 86)  BP: 120/58 (08 Jan 2024 11:41) (119/49 - 122/59)  BP(mean): --  RR: 18 (08 Jan 2024 11:41) (18 - 18)  SpO2: 100% (08 Jan 2024 11:41) (96% - 100%)    Parameters below as of 08 Jan 2024 11:41  Patient On (Oxygen Delivery Method): room air              General Exam:   General Appearance: Appropriately dressed and in no acute distress       Head: Normocephalic, atraumatic and no dysmorphic features  Ear, Nose, and Throat: Moist mucous membranes  CVS: S1S2+  Resp: No SOB, no wheeze or rhonchi  Abd: soft NTND  Extremities: No edema, no cyanosis  Skin: No bruises, no rashes     Neurological Exam:  Mental Status: Awake, alert and oriented x 1-2.  Able to follow simple commands. Able to name and repeat. fluent speech. No obvious aphasia or dysarthria noted.   Cranial Nerves: PERRL, EOMI, VFFC, sensation V1-V3 intact,  no obvious facial asymmetry, equal elevation of palate, scm/trap 5/5, tongue is midline on protrusion. hearing is grossly intact.   Motor: Normal bulk, tone and strength throughout. Fine finger movements were intact and symmetric. no tremors or drift noted.    Sensation: Intact to light touch and pinprick throughout. no right/left confusion. no extinction to tactile on DSS.   Reflexes: 1+ throughout at biceps, brachioradialis, triceps, patellars and ankles bilaterally and equal. No clonus. R toe and L toe were both downgoing.  Coordination: No dysmetria on FNF  Gait: Narrow based and steady.       I personally reviewed the below data/images/labs:      LABS:                    < from: CT Head No Cont (01.02.24 @ 17:45) >    ACC: [id number] EXAM:  CT BRAIN   ORDERED BY: MARANDA HAM     PROCEDURE DATE:  01/02/2024          INTERPRETATION:  .    CLINICAL INFORMATION: Confusion. Altered mental status. Dementia.    TECHNIQUE: Multiple axial CT images of the head were obtained without   contrast. Sagittal and coronal reconstructed images were acquired from   the source data.    COMPARISON: No prior CT studies of the brain are available for comparison   at this institution.    FINDINGS: There is no acute intracranial hemorrhage, mass effect, shift   of the midline structures, herniation, extra-axial fluid collection, or   hydrocephalus.    There is diffuse cerebral volume loss with prominence of the sulci,   fissures, and cisternal spaces which is normal for the patient's age.   Moderate ventriculomegaly is seen. 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    The paranasal sinuses and tympanomastoid cavities are clear. The   calvarium is intact. There is evidence of left-sided cataract removal.    IMPRESSION: No acute intracranial hemorrhage, mass effect, or shift of   the midline structures.    < end of copied text >   Neurology Progress Note    S: Patient seen and examined. No new events overnight.     Medications: MEDICATIONS  (STANDING):  melatonin 3 milliGRAM(s) Oral at bedtime  QUEtiapine 25 milliGRAM(s) Oral <User Schedule>    MEDICATIONS  (PRN):  acetaminophen     Tablet .. 650 milliGRAM(s) Oral every 6 hours PRN Temp greater or equal to 38C (100.4F), Mild Pain (1 - 3)  OLANZapine Injectable 1.25 milliGRAM(s) IntraMuscular every 6 hours PRN agitation       Vitals:  Vital Signs Last 24 Hrs  T(C): 36.7 (08 Jan 2024 11:41), Max: 36.8 (07 Jan 2024 22:13)  T(F): 98 (08 Jan 2024 11:41), Max: 98.2 (07 Jan 2024 22:13)  HR: 59 (08 Jan 2024 11:41) (59 - 86)  BP: 120/58 (08 Jan 2024 11:41) (119/49 - 122/59)  BP(mean): --  RR: 18 (08 Jan 2024 11:41) (18 - 18)  SpO2: 100% (08 Jan 2024 11:41) (96% - 100%)    Parameters below as of 08 Jan 2024 11:41  Patient On (Oxygen Delivery Method): room air              General Exam:   General Appearance: Appropriately dressed and in no acute distress       Head: Normocephalic, atraumatic and no dysmorphic features  Ear, Nose, and Throat: Moist mucous membranes  CVS: S1S2+  Resp: No SOB, no wheeze or rhonchi  Abd: soft NTND  Extremities: No edema, no cyanosis  Skin: No bruises, no rashes     Neurological Exam:  Mental Status: Awake, alert and oriented x 1-2.  Able to follow simple commands. Able to name and repeat. fluent speech. No obvious aphasia or dysarthria noted.   Cranial Nerves: PERRL, EOMI, VFFC, sensation V1-V3 intact,  no obvious facial asymmetry, equal elevation of palate, scm/trap 5/5, tongue is midline on protrusion. hearing is grossly intact.   Motor: Normal bulk, tone and strength throughout. Fine finger movements were intact and symmetric. no tremors or drift noted.    Sensation: Intact to light touch and pinprick throughout. no right/left confusion. no extinction to tactile on DSS.   Reflexes: 1+ throughout at biceps, brachioradialis, triceps, patellars and ankles bilaterally and equal. No clonus. R toe and L toe were both downgoing.  Coordination: No dysmetria on FNF  Gait: Narrow based and steady.       I personally reviewed the below data/images/labs:      LABS:                    < from: CT Head No Cont (01.02.24 @ 17:45) >    ACC: [id number] EXAM:  CT BRAIN   ORDERED BY: MARANDA HAM     PROCEDURE DATE:  01/02/2024          INTERPRETATION:  .    CLINICAL INFORMATION: Confusion. Altered mental status. Dementia.    TECHNIQUE: Multiple axial CT images of the head were obtained without   contrast. Sagittal and coronal reconstructed images were acquired from   the source data.    COMPARISON: No prior CT studies of the brain are available for comparison   at this institution.    FINDINGS: There is no acute intracranial hemorrhage, mass effect, shift   of the midline structures, herniation, extra-axial fluid collection, or   hydrocephalus.    There is diffuse cerebral volume loss with prominence of the sulci,   fissures, and cisternal spaces which is normal for the patient's age.   Moderate ventriculomegaly is seen. 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    The paranasal sinuses and tympanomastoid cavities are clear. The   calvarium is intact. There is evidence of left-sided cataract removal.    IMPRESSION: No acute intracranial hemorrhage, mass effect, or shift of   the midline structures.    < end of copied text >

## 2024-01-08 NOTE — BH CONSULTATION LIAISON PROGRESS NOTE - NSBHATTESTBILLING_PSY_A_CORE
00063-Whxoguqchy OBS or IP - moderate complexity OR 35-49 mins 27100-Cptpinojym OBS or IP - moderate complexity OR 35-49 mins

## 2024-01-08 NOTE — PROGRESS NOTE ADULT - SUBJECTIVE AND OBJECTIVE BOX
Patient is a 83y old  Female who presents with a chief complaint of confusion   1/8/24      SUBJECTIVE / OVERNIGHT EVENTS:pt seen and examined    MEDICATIONS  (STANDING):  melatonin 3 milliGRAM(s) Oral at bedtime  QUEtiapine 25 milliGRAM(s) Oral <User Schedule>    MEDICATIONS  (PRN):  acetaminophen     Tablet .. 650 milliGRAM(s) Oral every 6 hours PRN Temp greater or equal to 38C (100.4F), Mild Pain (1 - 3)  OLANZapine Injectable 1.25 milliGRAM(s) IntraMuscular every 6 hours PRN agitation    Vital Signs Last 24 Hrs  T(C): 36.7 (01-08-24 @ 11:41), Max: 36.8 (01-07-24 @ 22:13)  T(F): 98 (01-08-24 @ 11:41), Max: 98.2 (01-07-24 @ 22:13)  HR: 59 (01-08-24 @ 11:41) (59 - 86)  BP: 120/58 (01-08-24 @ 11:41) (119/49 - 122/59)  BP(mean): --  RR: 18 (01-08-24 @ 11:41) (18 - 18)  SpO2: 100% (01-08-24 @ 11:41) (96% - 100%)      PHYSICAL EXAM:  GENERAL: NAD  NECK: Supple, No JVD  CHEST/LUNG: dec breath sounds at bases  HEART: Regular rate and rhythm; No murmurs, rubs, or gallops  ABDOMEN: Soft, Nontender, Nondistended; Bowel sounds present  EXTREMITIES:   No edema  NEUROLOGY: Alert awake confused    LABS:        Creatinine Trend: 0.81 <--, 0.77 <--    Urine Studies:  Urinalysis Basic - ( 03 Jan 2024 05:20 )    Color:  / Appearance:  / SG:  / pH:   Gluc: 81 mg/dL / Ketone:   / Bili:  / Urobili:    Blood:  / Protein:  / Nitrite:    Leuk Esterase:  / RBC:  / WBC    Sq Epi:  / Non Sq Epi:  / Bacteria:                                                                                                               RADIOLOGY & ADDITIONAL TESTS:    Imaging Personally Reviewed:yes    Consultant(s) Notes Reviewed:  yes    Care Discussed with Consultants/Other Providers:yes

## 2024-01-09 LAB
B PERT DNA SPEC QL NAA+PROBE: SIGNIFICANT CHANGE UP
B PERT DNA SPEC QL NAA+PROBE: SIGNIFICANT CHANGE UP
B PERT+PARAPERT DNA PNL SPEC NAA+PROBE: SIGNIFICANT CHANGE UP
B PERT+PARAPERT DNA PNL SPEC NAA+PROBE: SIGNIFICANT CHANGE UP
BORDETELLA PARAPERTUSSIS (RAPRVP): SIGNIFICANT CHANGE UP
BORDETELLA PARAPERTUSSIS (RAPRVP): SIGNIFICANT CHANGE UP
C PNEUM DNA SPEC QL NAA+PROBE: SIGNIFICANT CHANGE UP
C PNEUM DNA SPEC QL NAA+PROBE: SIGNIFICANT CHANGE UP
FLUAV SUBTYP SPEC NAA+PROBE: SIGNIFICANT CHANGE UP
FLUAV SUBTYP SPEC NAA+PROBE: SIGNIFICANT CHANGE UP
FLUBV RNA SPEC QL NAA+PROBE: SIGNIFICANT CHANGE UP
FLUBV RNA SPEC QL NAA+PROBE: SIGNIFICANT CHANGE UP
HADV DNA SPEC QL NAA+PROBE: SIGNIFICANT CHANGE UP
HADV DNA SPEC QL NAA+PROBE: SIGNIFICANT CHANGE UP
HCOV 229E RNA SPEC QL NAA+PROBE: SIGNIFICANT CHANGE UP
HCOV 229E RNA SPEC QL NAA+PROBE: SIGNIFICANT CHANGE UP
HCOV HKU1 RNA SPEC QL NAA+PROBE: SIGNIFICANT CHANGE UP
HCOV HKU1 RNA SPEC QL NAA+PROBE: SIGNIFICANT CHANGE UP
HCOV NL63 RNA SPEC QL NAA+PROBE: SIGNIFICANT CHANGE UP
HCOV NL63 RNA SPEC QL NAA+PROBE: SIGNIFICANT CHANGE UP
HCOV OC43 RNA SPEC QL NAA+PROBE: SIGNIFICANT CHANGE UP
HCOV OC43 RNA SPEC QL NAA+PROBE: SIGNIFICANT CHANGE UP
HMPV RNA SPEC QL NAA+PROBE: SIGNIFICANT CHANGE UP
HMPV RNA SPEC QL NAA+PROBE: SIGNIFICANT CHANGE UP
HPIV1 RNA SPEC QL NAA+PROBE: SIGNIFICANT CHANGE UP
HPIV1 RNA SPEC QL NAA+PROBE: SIGNIFICANT CHANGE UP
HPIV2 RNA SPEC QL NAA+PROBE: SIGNIFICANT CHANGE UP
HPIV2 RNA SPEC QL NAA+PROBE: SIGNIFICANT CHANGE UP
HPIV3 RNA SPEC QL NAA+PROBE: SIGNIFICANT CHANGE UP
HPIV3 RNA SPEC QL NAA+PROBE: SIGNIFICANT CHANGE UP
HPIV4 RNA SPEC QL NAA+PROBE: SIGNIFICANT CHANGE UP
HPIV4 RNA SPEC QL NAA+PROBE: SIGNIFICANT CHANGE UP
M PNEUMO DNA SPEC QL NAA+PROBE: SIGNIFICANT CHANGE UP
M PNEUMO DNA SPEC QL NAA+PROBE: SIGNIFICANT CHANGE UP
RAPID RVP RESULT: DETECTED
RAPID RVP RESULT: DETECTED
RSV RNA SPEC QL NAA+PROBE: SIGNIFICANT CHANGE UP
RSV RNA SPEC QL NAA+PROBE: SIGNIFICANT CHANGE UP
RV+EV RNA SPEC QL NAA+PROBE: SIGNIFICANT CHANGE UP
RV+EV RNA SPEC QL NAA+PROBE: SIGNIFICANT CHANGE UP
SARS-COV-2 RNA SPEC QL NAA+PROBE: DETECTED
SARS-COV-2 RNA SPEC QL NAA+PROBE: DETECTED

## 2024-01-09 RX ADMIN — Medication 650 MILLIGRAM(S): at 17:11

## 2024-01-09 RX ADMIN — QUETIAPINE FUMARATE 25 MILLIGRAM(S): 200 TABLET, FILM COATED ORAL at 22:09

## 2024-01-09 RX ADMIN — Medication 3 MILLIGRAM(S): at 22:09

## 2024-01-09 RX ADMIN — OLANZAPINE 1.25 MILLIGRAM(S): 15 TABLET, FILM COATED ORAL at 20:05

## 2024-01-09 RX ADMIN — Medication 650 MILLIGRAM(S): at 18:10

## 2024-01-09 NOTE — PROGRESS NOTE ADULT - ASSESSMENT
82 y/o F with pmh of possible dementia p/w confusion, found wandering in the grocery store confused and brought to ED for eval. Found to have UTI, on Ceftriaxone  Currently confused, does not know where she is, witnessed roaming hallways trying to get into others rooms.   Labs wnl, slight leukocytosis that has since normalized  CTH with age related volume loss, no acute pathology \  B12 541. Folate 17.6, RPR neg    Encephalopathy likely due to UTI superimposed on dementia    Plan:  - CTh reviewed  - check  TSH  - continue treatment of UTI  - delirium precautions:   [] please have adequate sleeping environment for the patient, light on, curtains open, TV on during the day with regular stimulation and out of bed to chair if possible. During the night, close curtains, TV off, lights off, and minimize interruptions (limit blood draws and vital sign checks if possible). Regular interaction with patient with staff (introducing selves), frequent reorientation, and encouragement of visitors as allowed by hospital and unit policy. Regular toileting.  [] limitation of sedating medications as tolerated  [] maintain electrolytes within normal limits   - appreciate ID/psych recs  - PT/OT  - DVT ppx   84 y/o F with pmh of possible dementia p/w confusion, found wandering in the grocery store confused and brought to ED for eval. Found to have UTI, on Ceftriaxone  Currently confused, does not know where she is, witnessed roaming hallways trying to get into others rooms.   Labs wnl, slight leukocytosis that has since normalized  CTH with age related volume loss, no acute pathology \  B12 541. Folate 17.6, RPR neg    Encephalopathy likely due to UTI superimposed on dementia    Plan:  - CTh reviewed  - check  TSH  - continue treatment of UTI  - delirium precautions:   [] please have adequate sleeping environment for the patient, light on, curtains open, TV on during the day with regular stimulation and out of bed to chair if possible. During the night, close curtains, TV off, lights off, and minimize interruptions (limit blood draws and vital sign checks if possible). Regular interaction with patient with staff (introducing selves), frequent reorientation, and encouragement of visitors as allowed by hospital and unit policy. Regular toileting.  [] limitation of sedating medications as tolerated  [] maintain electrolytes within normal limits   - appreciate ID/psych recs  - PT/OT  - DVT ppx

## 2024-01-09 NOTE — PROGRESS NOTE ADULT - ASSESSMENT
82 y/o F with pmh of possible dementia p/w confusion. and found to have UTI. 84 y/o F with pmh of possible dementia p/w confusion. and found to have UTI.

## 2024-01-09 NOTE — PROGRESS NOTE ADULT - SUBJECTIVE AND OBJECTIVE BOX
Patient is a 83y old  Female who presents with a chief complaint of confusion   1/9/24      SUBJECTIVE / OVERNIGHT EVENTS:pt seen and examined    MEDICATIONS  (STANDING):  melatonin 3 milliGRAM(s) Oral at bedtime  QUEtiapine 25 milliGRAM(s) Oral <User Schedule>    MEDICATIONS  (PRN):  acetaminophen     Tablet .. 650 milliGRAM(s) Oral every 6 hours PRN Temp greater or equal to 38C (100.4F), Mild Pain (1 - 3)  OLANZapine Injectable 1.25 milliGRAM(s) IntraMuscular every 6 hours PRN agitation    Vital Signs Last 24 Hrs  T(C): 36.7 (01-08-24 @ 11:41), Max: 36.8 (01-07-24 @ 22:13)  T(F): 98 (01-08-24 @ 11:41), Max: 98.2 (01-07-24 @ 22:13)  HR: 59 (01-08-24 @ 11:41) (59 - 86)  BP: 120/58 (01-08-24 @ 11:41) (119/49 - 122/59)  BP(mean): --  RR: 18 (01-08-24 @ 11:41) (18 - 18)  SpO2: 100% (01-08-24 @ 11:41) (96% - 100%)      PHYSICAL EXAM:  GENERAL: NAD  NECK: Supple, No JVD  CHEST/LUNG: dec breath sounds at bases  HEART: Regular rate and rhythm; No murmurs, rubs, or gallops  ABDOMEN: Soft, Nontender, Nondistended; Bowel sounds present  EXTREMITIES:   No edema  NEUROLOGY: Alert awake confused    LABS:        Creatinine Trend: 0.81 <--, 0.77 <--    Urine Studies:  Urinalysis Basic - ( 03 Jan 2024 05:20 )    Color:  / Appearance:  / SG:  / pH:   Gluc: 81 mg/dL / Ketone:   / Bili:  / Urobili:    Blood:  / Protein:  / Nitrite:    Leuk Esterase:  / RBC:  / WBC    Sq Epi:  / Non Sq Epi:  / Bacteria:                                                                                                               RADIOLOGY & ADDITIONAL TESTS:    Imaging Personally Reviewed:yes    Consultant(s) Notes Reviewed:  yes    Care Discussed with Consultants/Other Providers:yes

## 2024-01-09 NOTE — PROGRESS NOTE ADULT - SUBJECTIVE AND OBJECTIVE BOX
Neurology Progress Note    S: Patient seen and examined. No new events overnight.     MEDICATIONS:    acetaminophen     Tablet .. 650 milliGRAM(s) Oral every 6 hours PRN  melatonin 3 milliGRAM(s) Oral at bedtime  OLANZapine Injectable 1.25 milliGRAM(s) IntraMuscular every 6 hours PRN  QUEtiapine 25 milliGRAM(s) Oral <User Schedule>      LABS:            CAPILLARY BLOOD GLUCOSE            I&O's Summary    Vital Signs Last 24 Hrs  T(C): 36.4 (09 Jan 2024 04:35), Max: 36.7 (08 Jan 2024 22:07)  T(F): 97.5 (09 Jan 2024 04:35), Max: 98 (08 Jan 2024 22:07)  HR: 73 (09 Jan 2024 04:35) (72 - 73)  BP: 127/65 (09 Jan 2024 04:35) (114/61 - 127/65)  BP(mean): --  RR: 17 (09 Jan 2024 04:35) (17 - 18)  SpO2: 98% (09 Jan 2024 04:35) (98% - 98%)    Parameters below as of 09 Jan 2024 04:35  Patient On (Oxygen Delivery Method): room air        General Exam:   General Appearance: Appropriately dressed and in no acute distress       Head: Normocephalic, atraumatic and no dysmorphic features  Ear, Nose, and Throat: Moist mucous membranes  CVS: S1S2+  Resp: No SOB, no wheeze or rhonchi  Abd: soft NTND  Extremities: No edema, no cyanosis  Skin: No bruises, no rashes     Neurological Exam:  Mental Status: Awake, alert and oriented x 1-2.  Able to follow simple commands. Able to name and repeat. fluent speech. No obvious aphasia or dysarthria noted.   Cranial Nerves: PERRL, EOMI, VFFC, sensation V1-V3 intact,  no obvious facial asymmetry, equal elevation of palate, scm/trap 5/5, tongue is midline on protrusion. hearing is grossly intact.   Motor: Normal bulk, tone and strength throughout. Fine finger movements were intact and symmetric. no tremors or drift noted.    Sensation: Intact to light touch and pinprick throughout. no right/left confusion. no extinction to tactile on DSS.   Reflexes: 1+ throughout at biceps, brachioradialis, triceps, patellars and ankles bilaterally and equal. No clonus. R toe and L toe were both downgoing.  Coordination: No dysmetria on FNF  Gait: Narrow based and steady.       I personally reviewed the below data/images/labs:            < from: CT Head No Cont (01.02.24 @ 17:45) >    ACC: [id number] EXAM:  CT BRAIN   ORDERED BY: MARANDA HAM     PROCEDURE DATE:  01/02/2024          INTERPRETATION:  .    CLINICAL INFORMATION: Confusion. Altered mental status. Dementia.    TECHNIQUE: Multiple axial CT images of the head were obtained without   contrast. Sagittal and coronal reconstructed images were acquired from   the source data.    COMPARISON: No prior CT studies of the brain are available for comparison   at this institution.    FINDINGS: There is no acute intracranial hemorrhage, mass effect, shift   of the midline structures, herniation, extra-axial fluid collection, or   hydrocephalus.    There is diffuse cerebral volume loss with prominence of the sulci,   fissures, and cisternal spaces which is normal for the patient's age.   Moderate ventriculomegaly is seen. 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    The paranasal sinuses and tympanomastoid cavities are clear. The   calvarium is intact. There is evidence of left-sided cataract removal.    IMPRESSION: No acute intracranial hemorrhage, mass effect, or shift of   the midline structures.    < end of copied text >   Neurology Progress Note    S: Patient seen and examined. No new events overnight.     MEDICATIONS:    acetaminophen     Tablet .. 650 milliGRAM(s) Oral every 6 hours PRN  melatonin 3 milliGRAM(s) Oral at bedtime  OLANZapine Injectable 1.25 milliGRAM(s) IntraMuscular every 6 hours PRN  QUEtiapine 25 milliGRAM(s) Oral <User Schedule>      LABS:            CAPILLARY BLOOD GLUCOSE            I&O's Summary    Vital Signs Last 24 Hrs  T(C): 36.4 (09 Jan 2024 04:35), Max: 36.7 (08 Jan 2024 22:07)  T(F): 97.5 (09 Jan 2024 04:35), Max: 98 (08 Jan 2024 22:07)  HR: 73 (09 Jan 2024 04:35) (72 - 73)  BP: 127/65 (09 Jan 2024 04:35) (114/61 - 127/65)  BP(mean): --  RR: 17 (09 Jan 2024 04:35) (17 - 18)  SpO2: 98% (09 Jan 2024 04:35) (98% - 98%)    Parameters below as of 09 Jan 2024 04:35  Patient On (Oxygen Delivery Method): room air        General Exam:   General Appearance: Appropriately dressed and in no acute distress       Head: Normocephalic, atraumatic and no dysmorphic features  Ear, Nose, and Throat: Moist mucous membranes  CVS: S1S2+  Resp: No SOB, no wheeze or rhonchi  Abd: soft NTND  Extremities: No edema, no cyanosis  Skin: No bruises, no rashes     Neurological Exam:  Mental Status: Awake, alert and oriented x 1-2.  Able to follow simple commands. Able to name and repeat. fluent speech. No obvious aphasia or dysarthria noted.   Cranial Nerves: PERRL, EOMI, VFFC, sensation V1-V3 intact,  no obvious facial asymmetry, equal elevation of palate, scm/trap 5/5, tongue is midline on protrusion. hearing is grossly intact.   Motor: Normal bulk, tone and strength throughout. Fine finger movements were intact and symmetric. no tremors or drift noted.    Sensation: Intact to light touch and pinprick throughout. no right/left confusion. no extinction to tactile on DSS.   Reflexes: 1+ throughout at biceps, brachioradialis, triceps, patellars and ankles bilaterally and equal. No clonus. R toe and L toe were both downgoing.  Coordination: No dysmetria on FNF  Gait: Narrow based and steady.       I personally reviewed the below data/images/labs:            < from: CT Head No Cont (01.02.24 @ 17:45) >    ACC: [id number] EXAM:  CT BRAIN   ORDERED BY: MARANDA HAM     PROCEDURE DATE:  01/02/2024          INTERPRETATION:  .    CLINICAL INFORMATION: Confusion. Altered mental status. Dementia.    TECHNIQUE: Multiple axial CT images of the head were obtained without   contrast. Sagittal and coronal reconstructed images were acquired from   the source data.    COMPARISON: No prior CT studies of the brain are available for comparison   at this institution.    FINDINGS: There is no acute intracranial hemorrhage, mass effect, shift   of the midline structures, herniation, extra-axial fluid collection, or   hydrocephalus.    There is diffuse cerebral volume loss with prominence of the sulci,   fissures, and cisternal spaces which is normal for the patient's age.   Moderate ventriculomegaly is seen. 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    The paranasal sinuses and tympanomastoid cavities are clear. The   calvarium is intact. There is evidence of left-sided cataract removal.    IMPRESSION: No acute intracranial hemorrhage, mass effect, or shift of   the midline structures.    < end of copied text >

## 2024-01-10 RX ADMIN — Medication 3 MILLIGRAM(S): at 21:30

## 2024-01-10 RX ADMIN — QUETIAPINE FUMARATE 25 MILLIGRAM(S): 200 TABLET, FILM COATED ORAL at 21:31

## 2024-01-10 NOTE — PROGRESS NOTE ADULT - SUBJECTIVE AND OBJECTIVE BOX
Patient is a 83y old  Female who presents with a chief complaint of confusion   1/10/24      SUBJECTIVE / OVERNIGHT EVENTS:pt seen and examined    MEDICATIONS  (STANDING):  melatonin 3 milliGRAM(s) Oral at bedtime  QUEtiapine 25 milliGRAM(s) Oral <User Schedule>    MEDICATIONS  (PRN):  acetaminophen     Tablet .. 650 milliGRAM(s) Oral every 6 hours PRN Temp greater or equal to 38C (100.4F), Mild Pain (1 - 3)  OLANZapine Injectable 1.25 milliGRAM(s) IntraMuscular every 6 hours PRN agitation    Vital Signs Last 24 Hrs  T(C): 36.7 (01-10-24 @ 19:33), Max: 36.9 (01-10-24 @ 13:20)  T(F): 98 (01-10-24 @ 19:33), Max: 98.5 (01-10-24 @ 17:00)  HR: 68 (01-10-24 @ 19:33) (58 - 98)  BP: 113/68 (01-10-24 @ 19:33) (106/62 - 139/68)  BP(mean): --  RR: 18 (01-10-24 @ 19:33) (18 - 18)  SpO2: 100% (01-10-24 @ 19:33) (97% - 100%)        PHYSICAL EXAM:  GENERAL: NAD  NECK: Supple, No JVD  CHEST/LUNG: dec breath sounds at bases  HEART: Regular rate and rhythm; No murmurs, rubs, or gallops  ABDOMEN: Soft, Nontender, Nondistended; Bowel sounds present  EXTREMITIES:   No edema  NEUROLOGY: Alert awake confused    LABS:        Creatinine Trend:     Urine Studies:                                                                                                                              RADIOLOGY & ADDITIONAL TESTS:    Imaging Personally Reviewed:yes    Consultant(s) Notes Reviewed:  yes    Care Discussed with Consultants/Other Providers:yes

## 2024-01-11 RX ADMIN — Medication 3 MILLIGRAM(S): at 21:14

## 2024-01-11 RX ADMIN — QUETIAPINE FUMARATE 25 MILLIGRAM(S): 200 TABLET, FILM COATED ORAL at 20:55

## 2024-01-11 NOTE — PROGRESS NOTE ADULT - ASSESSMENT
82 y/o F with pmh of possible dementia p/w confusion, found wandering in the grocery store confused and brought to ED for eval. Found to have UTI, on Ceftriaxone  Currently confused, does not know where she is, witnessed roaming hallways trying to get into others rooms.   Labs wnl, slight leukocytosis that has since normalized  CTH with age related volume loss, no acute pathology \  B12 541. Folate 17.6, RPR neg    Encephalopathy likely due to UTI superimposed on dementia    Plan:  - CTh reviewed  - check  TSH  - continue treatment of UTI  - delirium precautions:   [] please have adequate sleeping environment for the patient, light on, curtains open, TV on during the day with regular stimulation and out of bed to chair if possible. During the night, close curtains, TV off, lights off, and minimize interruptions (limit blood draws and vital sign checks if possible). Regular interaction with patient with staff (introducing selves), frequent reorientation, and encouragement of visitors as allowed by hospital and unit policy. Regular toileting.  [] limitation of sedating medications as tolerated  [] maintain electrolytes within normal limits   - appreciate ID/psych recs  - PT/OT  - DVT ppx

## 2024-01-11 NOTE — PROGRESS NOTE ADULT - SUBJECTIVE AND OBJECTIVE BOX
Neurology Progress Note    S: Patient seen and examined. No new events overnight.       MEDICATIONS:    acetaminophen     Tablet .. 650 milliGRAM(s) Oral every 6 hours PRN  melatonin 3 milliGRAM(s) Oral at bedtime  OLANZapine Injectable 1.25 milliGRAM(s) IntraMuscular every 6 hours PRN  QUEtiapine 25 milliGRAM(s) Oral <User Schedule>      LABS:            CAPILLARY BLOOD GLUCOSE            I&O's Summary    Vital Signs Last 24 Hrs  T(C): 36.4 (11 Jan 2024 04:34), Max: 36.9 (10 Carlos Alberto 2024 13:20)  T(F): 97.6 (11 Jan 2024 04:34), Max: 98.5 (10 Carlos Alberto 2024 17:00)  HR: 64 (11 Jan 2024 04:34) (63 - 98)  BP: 121/65 (11 Jan 2024 04:34) (113/68 - 139/68)  BP(mean): --  RR: 18 (11 Jan 2024 04:34) (18 - 18)  SpO2: 100% (11 Jan 2024 04:34) (97% - 100%)    Parameters below as of 11 Jan 2024 04:34  Patient On (Oxygen Delivery Method): room air        General Exam:   General Appearance: Appropriately dressed and in no acute distress       Head: Normocephalic, atraumatic and no dysmorphic features  Ear, Nose, and Throat: Moist mucous membranes  CVS: S1S2+  Resp: No SOB, no wheeze or rhonchi  Abd: soft NTND  Extremities: No edema, no cyanosis  Skin: No bruises, no rashes     Neurological Exam:  Mental Status: Awake, alert and oriented x 1-2.  Able to follow simple commands. Able to name and repeat. fluent speech. No obvious aphasia or dysarthria noted.   Cranial Nerves: PERRL, EOMI, VFFC, sensation V1-V3 intact,  no obvious facial asymmetry, equal elevation of palate, scm/trap 5/5, tongue is midline on protrusion. hearing is grossly intact.   Motor: Normal bulk, tone and strength throughout. Fine finger movements were intact and symmetric. no tremors or drift noted.    Sensation: Intact to light touch and pinprick throughout. no right/left confusion. no extinction to tactile on DSS.   Reflexes: 1+ throughout at biceps, brachioradialis, triceps, patellars and ankles bilaterally and equal. No clonus. R toe and L toe were both downgoing.  Coordination: No dysmetria on FNF  Gait: Narrow based and steady.       I personally reviewed the below data/images/labs:            < from: CT Head No Cont (01.02.24 @ 17:45) >    ACC: [id number] EXAM:  CT BRAIN   ORDERED BY: MARANDA HAM     PROCEDURE DATE:  01/02/2024          INTERPRETATION:  .    CLINICAL INFORMATION: Confusion. Altered mental status. Dementia.    TECHNIQUE: Multiple axial CT images of the head were obtained without   contrast. Sagittal and coronal reconstructed images were acquired from   the source data.    COMPARISON: No prior CT studies of the brain are available for comparison   at this institution.    FINDINGS: There is no acute intracranial hemorrhage, mass effect, shift   of the midline structures, herniation, extra-axial fluid collection, or   hydrocephalus.    There is diffuse cerebral volume loss with prominence of the sulci,   fissures, and cisternal spaces which is normal for the patient's age.   Moderate ventriculomegaly is seen. 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    The paranasal sinuses and tympanomastoid cavities are clear. The   calvarium is intact. There is evidence of left-sided cataract removal.    IMPRESSION: No acute intracranial hemorrhage, mass effect, or shift of   the midline structures.    < end of copied text >   Neurology Progress Note    S: Patient seen and examined. No new events overnight.       MEDICATIONS:    acetaminophen     Tablet .. 650 milliGRAM(s) Oral every 6 hours PRN  melatonin 3 milliGRAM(s) Oral at bedtime  OLANZapine Injectable 1.25 milliGRAM(s) IntraMuscular every 6 hours PRN  QUEtiapine 25 milliGRAM(s) Oral <User Schedule>      LABS:            CAPILLARY BLOOD GLUCOSE            I&O's Summary    Vital Signs Last 24 Hrs  T(C): 36.4 (11 Jan 2024 04:34), Max: 36.9 (10 Carlos Alberto 2024 13:20)  T(F): 97.6 (11 Jan 2024 04:34), Max: 98.5 (10 Carlos Alberto 2024 17:00)  HR: 64 (11 Jan 2024 04:34) (63 - 98)  BP: 121/65 (11 Jan 2024 04:34) (113/68 - 139/68)  BP(mean): --  RR: 18 (11 Jan 2024 04:34) (18 - 18)  SpO2: 100% (11 Jan 2024 04:34) (97% - 100%)    Parameters below as of 11 Jan 2024 04:34  Patient On (Oxygen Delivery Method): room air        General Exam:   General Appearance: Appropriately dressed and in no acute distress       Head: Normocephalic, atraumatic and no dysmorphic features  Ear, Nose, and Throat: Moist mucous membranes  CVS: S1S2+  Resp: No SOB, no wheeze or rhonchi  Abd: soft NTND  Extremities: No edema, no cyanosis  Skin: No bruises, no rashes     Neurological Exam:  Mental Status: Awake, alert and oriented x 1-2.  Able to follow simple commands. Able to name and repeat. fluent speech. No obvious aphasia or dysarthria noted.   Cranial Nerves: PERRL, EOMI, VFFC, sensation V1-V3 intact,  no obvious facial asymmetry, equal elevation of palate, scm/trap 5/5, tongue is midline on protrusion. hearing is grossly intact.   Motor: Normal bulk, tone and strength throughout. Fine finger movements were intact and symmetric. no tremors or drift noted.    Sensation: Intact to light touch and pinprick throughout. no right/left confusion. no extinction to tactile on DSS.   Reflexes: 1+ throughout at biceps, brachioradialis, triceps, patellars and ankles bilaterally and equal. No clonus. R toe and L toe were both downgoing.  Coordination: No dysmetria on FNF  Gait: Narrow based and steady.       I personally reviewed the below data/images/labs:            < from: CT Head No Cont (01.02.24 @ 17:45) >    ACC: [id number] EXAM:  CT BRAIN   ORDERED BY: MARANDA HAM     PROCEDURE DATE:  01/02/2024          INTERPRETATION:  .    CLINICAL INFORMATION: Confusion. Altered mental status. Dementia.    TECHNIQUE: Multiple axial CT images of the head were obtained without   contrast. Sagittal and coronal reconstructed images were acquired from   the source data.    COMPARISON: No prior CT studies of the brain are available for comparison   at this institution.    FINDINGS: There is no acute intracranial hemorrhage, mass effect, shift   of the midline structures, herniation, extra-axial fluid collection, or   hydrocephalus.    There is diffuse cerebral volume loss with prominence of the sulci,   fissures, and cisternal spaces which is normal for the patient's age.   Moderate ventriculomegaly is seen. 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    The paranasal sinuses and tympanomastoid cavities are clear. The   calvarium is intact. There is evidence of left-sided cataract removal.    IMPRESSION: No acute intracranial hemorrhage, mass effect, or shift of   the midline structures.    < end of copied text >

## 2024-01-11 NOTE — PROGRESS NOTE ADULT - SUBJECTIVE AND OBJECTIVE BOX
Patient is a 83y old  Female who presents with a chief complaint of confusion   1/11/24      SUBJECTIVE / OVERNIGHT EVENTS:pt seen and examined    MEDICATIONS  (STANDING):  melatonin 3 milliGRAM(s) Oral at bedtime  QUEtiapine 25 milliGRAM(s) Oral <User Schedule>    MEDICATIONS  (PRN):  acetaminophen     Tablet .. 650 milliGRAM(s) Oral every 6 hours PRN Temp greater or equal to 38C (100.4F), Mild Pain (1 - 3)  OLANZapine Injectable 1.25 milliGRAM(s) IntraMuscular every 6 hours PRN agitation      Vital Signs Last 24 Hrs  T(C): 36.2 (01-11-24 @ 21:36), Max: 37.2 (01-11-24 @ 12:31)  T(F): 97.2 (01-11-24 @ 21:36), Max: 98.9 (01-11-24 @ 12:31)  HR: 64 (01-11-24 @ 21:36) (64 - 81)  BP: 124/61 (01-11-24 @ 21:36) (121/65 - 154/77)  BP(mean): --  RR: 18 (01-11-24 @ 21:36) (18 - 18)  SpO2: 98% (01-11-24 @ 21:36) (98% - 100%)        PHYSICAL EXAM:  GENERAL: NAD  NECK: Supple, No JVD  CHEST/LUNG: dec breath sounds at bases  HEART: Regular rate and rhythm; No murmurs, rubs, or gallops  ABDOMEN: Soft, Nontender, Nondistended; Bowel sounds present  EXTREMITIES:   No edema  NEUROLOGY: Alert awake confused    LABS:        Creatinine Trend:     Urine Studies:                                                                                                                                RADIOLOGY & ADDITIONAL TESTS:    Imaging Personally Reviewed:yes    Consultant(s) Notes Reviewed:  yes    Care Discussed with Consultants/Other Providers:yes

## 2024-01-12 LAB
ANION GAP SERPL CALC-SCNC: 10 MMOL/L — SIGNIFICANT CHANGE UP (ref 7–14)
ANION GAP SERPL CALC-SCNC: 10 MMOL/L — SIGNIFICANT CHANGE UP (ref 7–14)
BUN SERPL-MCNC: 14 MG/DL — SIGNIFICANT CHANGE UP (ref 7–23)
BUN SERPL-MCNC: 14 MG/DL — SIGNIFICANT CHANGE UP (ref 7–23)
CALCIUM SERPL-MCNC: 8.1 MG/DL — LOW (ref 8.4–10.5)
CALCIUM SERPL-MCNC: 8.1 MG/DL — LOW (ref 8.4–10.5)
CHLORIDE SERPL-SCNC: 105 MMOL/L — SIGNIFICANT CHANGE UP (ref 98–107)
CHLORIDE SERPL-SCNC: 105 MMOL/L — SIGNIFICANT CHANGE UP (ref 98–107)
CO2 SERPL-SCNC: 24 MMOL/L — SIGNIFICANT CHANGE UP (ref 22–31)
CO2 SERPL-SCNC: 24 MMOL/L — SIGNIFICANT CHANGE UP (ref 22–31)
CREAT SERPL-MCNC: 0.69 MG/DL — SIGNIFICANT CHANGE UP (ref 0.5–1.3)
CREAT SERPL-MCNC: 0.69 MG/DL — SIGNIFICANT CHANGE UP (ref 0.5–1.3)
EGFR: 86 ML/MIN/1.73M2 — SIGNIFICANT CHANGE UP
EGFR: 86 ML/MIN/1.73M2 — SIGNIFICANT CHANGE UP
GLUCOSE SERPL-MCNC: 85 MG/DL — SIGNIFICANT CHANGE UP (ref 70–99)
GLUCOSE SERPL-MCNC: 85 MG/DL — SIGNIFICANT CHANGE UP (ref 70–99)
HCT VFR BLD CALC: 34.9 % — SIGNIFICANT CHANGE UP (ref 34.5–45)
HCT VFR BLD CALC: 34.9 % — SIGNIFICANT CHANGE UP (ref 34.5–45)
HGB BLD-MCNC: 11.3 G/DL — LOW (ref 11.5–15.5)
HGB BLD-MCNC: 11.3 G/DL — LOW (ref 11.5–15.5)
MAGNESIUM SERPL-MCNC: 1.9 MG/DL — SIGNIFICANT CHANGE UP (ref 1.6–2.6)
MAGNESIUM SERPL-MCNC: 1.9 MG/DL — SIGNIFICANT CHANGE UP (ref 1.6–2.6)
MCHC RBC-ENTMCNC: 30.1 PG — SIGNIFICANT CHANGE UP (ref 27–34)
MCHC RBC-ENTMCNC: 30.1 PG — SIGNIFICANT CHANGE UP (ref 27–34)
MCHC RBC-ENTMCNC: 32.4 GM/DL — SIGNIFICANT CHANGE UP (ref 32–36)
MCHC RBC-ENTMCNC: 32.4 GM/DL — SIGNIFICANT CHANGE UP (ref 32–36)
MCV RBC AUTO: 92.8 FL — SIGNIFICANT CHANGE UP (ref 80–100)
MCV RBC AUTO: 92.8 FL — SIGNIFICANT CHANGE UP (ref 80–100)
NRBC # BLD: 0 /100 WBCS — SIGNIFICANT CHANGE UP (ref 0–0)
NRBC # BLD: 0 /100 WBCS — SIGNIFICANT CHANGE UP (ref 0–0)
NRBC # FLD: 0 K/UL — SIGNIFICANT CHANGE UP (ref 0–0)
NRBC # FLD: 0 K/UL — SIGNIFICANT CHANGE UP (ref 0–0)
PHOSPHATE SERPL-MCNC: 3.2 MG/DL — SIGNIFICANT CHANGE UP (ref 2.5–4.5)
PHOSPHATE SERPL-MCNC: 3.2 MG/DL — SIGNIFICANT CHANGE UP (ref 2.5–4.5)
PLATELET # BLD AUTO: 128 K/UL — LOW (ref 150–400)
PLATELET # BLD AUTO: 128 K/UL — LOW (ref 150–400)
POTASSIUM SERPL-MCNC: 4 MMOL/L — SIGNIFICANT CHANGE UP (ref 3.5–5.3)
POTASSIUM SERPL-MCNC: 4 MMOL/L — SIGNIFICANT CHANGE UP (ref 3.5–5.3)
POTASSIUM SERPL-SCNC: 4 MMOL/L — SIGNIFICANT CHANGE UP (ref 3.5–5.3)
POTASSIUM SERPL-SCNC: 4 MMOL/L — SIGNIFICANT CHANGE UP (ref 3.5–5.3)
RBC # BLD: 3.76 M/UL — LOW (ref 3.8–5.2)
RBC # BLD: 3.76 M/UL — LOW (ref 3.8–5.2)
RBC # FLD: 13.6 % — SIGNIFICANT CHANGE UP (ref 10.3–14.5)
RBC # FLD: 13.6 % — SIGNIFICANT CHANGE UP (ref 10.3–14.5)
SODIUM SERPL-SCNC: 139 MMOL/L — SIGNIFICANT CHANGE UP (ref 135–145)
SODIUM SERPL-SCNC: 139 MMOL/L — SIGNIFICANT CHANGE UP (ref 135–145)
TSH SERPL-MCNC: 2.02 UIU/ML — SIGNIFICANT CHANGE UP (ref 0.27–4.2)
TSH SERPL-MCNC: 2.02 UIU/ML — SIGNIFICANT CHANGE UP (ref 0.27–4.2)
TSH SERPL-MCNC: 2.05 UIU/ML — SIGNIFICANT CHANGE UP (ref 0.27–4.2)
TSH SERPL-MCNC: 2.05 UIU/ML — SIGNIFICANT CHANGE UP (ref 0.27–4.2)
WBC # BLD: 4.72 K/UL — SIGNIFICANT CHANGE UP (ref 3.8–10.5)
WBC # BLD: 4.72 K/UL — SIGNIFICANT CHANGE UP (ref 3.8–10.5)
WBC # FLD AUTO: 4.72 K/UL — SIGNIFICANT CHANGE UP (ref 3.8–10.5)
WBC # FLD AUTO: 4.72 K/UL — SIGNIFICANT CHANGE UP (ref 3.8–10.5)

## 2024-01-12 RX ADMIN — QUETIAPINE FUMARATE 25 MILLIGRAM(S): 200 TABLET, FILM COATED ORAL at 20:52

## 2024-01-12 RX ADMIN — Medication 3 MILLIGRAM(S): at 21:08

## 2024-01-12 NOTE — PROGRESS NOTE ADULT - SUBJECTIVE AND OBJECTIVE BOX
Patient is a 83y old  Female who presents with a chief complaint of confusion   1/12/24      SUBJECTIVE / OVERNIGHT EVENTS:pt seen and examined  covid+    MEDICATIONS  (STANDING):  melatonin 3 milliGRAM(s) Oral at bedtime  QUEtiapine 25 milliGRAM(s) Oral <User Schedule>    MEDICATIONS  (PRN):  acetaminophen     Tablet .. 650 milliGRAM(s) Oral every 6 hours PRN Temp greater or equal to 38C (100.4F), Mild Pain (1 - 3)  OLANZapine Injectable 1.25 milliGRAM(s) IntraMuscular every 6 hours PRN agitation    Vital Signs Last 24 Hrs  T(C): 36.7 (01-12-24 @ 22:19), Max: 37.1 (01-12-24 @ 11:47)  T(F): 98 (01-12-24 @ 22:19), Max: 98.7 (01-12-24 @ 11:47)  HR: 78 (01-12-24 @ 22:19) (67 - 85)  BP: 135/65 (01-12-24 @ 22:19) (126/66 - 135/65)  BP(mean): --  RR: 17 (01-12-24 @ 22:19) (17 - 18)  SpO2: 97% (01-12-24 @ 22:19) (97% - 99%)          PHYSICAL EXAM:  GENERAL: NAD  NECK: Supple, No JVD  CHEST/LUNG: dec breath sounds at bases  HEART: Regular rate and rhythm; No murmurs, rubs, or gallops  ABDOMEN: Soft, Nontender, Nondistended; Bowel sounds present  EXTREMITIES:   No edema  NEUROLOGY: Alert awake confused    LABS:        Creatinine Trend:     Urine Studies:                                                                                                                                RADIOLOGY & ADDITIONAL TESTS:    Imaging Personally Reviewed:yes    Consultant(s) Notes Reviewed:  yes    Care Discussed with Consultants/Other Providers:yes

## 2024-01-12 NOTE — PROGRESS NOTE ADULT - SUBJECTIVE AND OBJECTIVE BOX
Neurology Progress Note    S: Patient seen and examined. No new events overnight.     MEDICATIONS:    acetaminophen     Tablet .. 650 milliGRAM(s) Oral every 6 hours PRN  melatonin 3 milliGRAM(s) Oral at bedtime  OLANZapine Injectable 1.25 milliGRAM(s) IntraMuscular every 6 hours PRN  QUEtiapine 25 milliGRAM(s) Oral <User Schedule>      LABS:                          11.3   4.72  )-----------( 128      ( 12 Jan 2024 05:00 )             34.9     01-12    139  |  105  |  14  ----------------------------<  85  4.0   |  24  |  0.69    Ca    8.1<L>      12 Jan 2024 05:00  Phos  3.2     01-12  Mg     1.90     01-12      CAPILLARY BLOOD GLUCOSE          Urinalysis Basic - ( 12 Jan 2024 05:00 )    Color: x / Appearance: x / SG: x / pH: x  Gluc: 85 mg/dL / Ketone: x  / Bili: x / Urobili: x   Blood: x / Protein: x / Nitrite: x   Leuk Esterase: x / RBC: x / WBC x   Sq Epi: x / Non Sq Epi: x / Bacteria: x      I&O's Summary    Vital Signs Last 24 Hrs  T(C): 36.4 (12 Jan 2024 04:41), Max: 37.2 (11 Jan 2024 12:31)  T(F): 97.6 (12 Jan 2024 04:41), Max: 98.9 (11 Jan 2024 12:31)  HR: 67 (12 Jan 2024 04:41) (64 - 81)  BP: 126/66 (12 Jan 2024 04:41) (124/61 - 154/77)  BP(mean): --  RR: 18 (12 Jan 2024 04:41) (18 - 18)  SpO2: 99% (12 Jan 2024 04:41) (98% - 99%)    Parameters below as of 12 Jan 2024 04:41  Patient On (Oxygen Delivery Method): room air            General Exam:   General Appearance: Appropriately dressed and in no acute distress       Head: Normocephalic, atraumatic and no dysmorphic features  Ear, Nose, and Throat: Moist mucous membranes  CVS: S1S2+  Resp: No SOB, no wheeze or rhonchi  Abd: soft NTND  Extremities: No edema, no cyanosis  Skin: No bruises, no rashes     Neurological Exam:  Mental Status: Awake, alert and oriented x 1-2.  Able to follow simple commands. Able to name and repeat. fluent speech. No obvious aphasia or dysarthria noted.   Cranial Nerves: PERRL, EOMI, VFFC, sensation V1-V3 intact,  no obvious facial asymmetry, equal elevation of palate, scm/trap 5/5, tongue is midline on protrusion. hearing is grossly intact.   Motor: Normal bulk, tone and strength throughout. Fine finger movements were intact and symmetric. no tremors or drift noted.    Sensation: Intact to light touch and pinprick throughout. no right/left confusion. no extinction to tactile on DSS.   Reflexes: 1+ throughout at biceps, brachioradialis, triceps, patellars and ankles bilaterally and equal. No clonus. R toe and L toe were both downgoing.  Coordination: No dysmetria on FNF  Gait: Narrow based and steady.       I personally reviewed the below data/images/labs:            < from: CT Head No Cont (01.02.24 @ 17:45) >    ACC: [id number] EXAM:  CT BRAIN   ORDERED BY: MARANDA HAM     PROCEDURE DATE:  01/02/2024          INTERPRETATION:  .    CLINICAL INFORMATION: Confusion. Altered mental status. Dementia.    TECHNIQUE: Multiple axial CT images of the head were obtained without   contrast. Sagittal and coronal reconstructed images were acquired from   the source data.    COMPARISON: No prior CT studies of the brain are available for comparison   at this institution.    FINDINGS: There is no acute intracranial hemorrhage, mass effect, shift   of the midline structures, herniation, extra-axial fluid collection, or   hydrocephalus.    There is diffuse cerebral volume loss with prominence of the sulci,   fissures, and cisternal spaces which is normal for the patient's age.   Moderate ventriculomegaly is seen. 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    The paranasal sinuses and tympanomastoid cavities are clear. The   calvarium is intact. There is evidence of left-sided cataract removal.    IMPRESSION: No acute intracranial hemorrhage, mass effect, or shift of   the midline structures.    < end of copied text >   Neurology Progress Note    S: Patient seen and examined. No new events overnight.     MEDICATIONS:    acetaminophen     Tablet .. 650 milliGRAM(s) Oral every 6 hours PRN  melatonin 3 milliGRAM(s) Oral at bedtime  OLANZapine Injectable 1.25 milliGRAM(s) IntraMuscular every 6 hours PRN  QUEtiapine 25 milliGRAM(s) Oral <User Schedule>      LABS:                          11.3   4.72  )-----------( 128      ( 12 Jan 2024 05:00 )             34.9     01-12    139  |  105  |  14  ----------------------------<  85  4.0   |  24  |  0.69    Ca    8.1<L>      12 Jan 2024 05:00  Phos  3.2     01-12  Mg     1.90     01-12      CAPILLARY BLOOD GLUCOSE          Urinalysis Basic - ( 12 Jan 2024 05:00 )    Color: x / Appearance: x / SG: x / pH: x  Gluc: 85 mg/dL / Ketone: x  / Bili: x / Urobili: x   Blood: x / Protein: x / Nitrite: x   Leuk Esterase: x / RBC: x / WBC x   Sq Epi: x / Non Sq Epi: x / Bacteria: x      I&O's Summary    Vital Signs Last 24 Hrs  T(C): 36.4 (12 Jan 2024 04:41), Max: 37.2 (11 Jan 2024 12:31)  T(F): 97.6 (12 Jan 2024 04:41), Max: 98.9 (11 Jan 2024 12:31)  HR: 67 (12 Jan 2024 04:41) (64 - 81)  BP: 126/66 (12 Jan 2024 04:41) (124/61 - 154/77)  BP(mean): --  RR: 18 (12 Jan 2024 04:41) (18 - 18)  SpO2: 99% (12 Jan 2024 04:41) (98% - 99%)    Parameters below as of 12 Jan 2024 04:41  Patient On (Oxygen Delivery Method): room air            General Exam:   General Appearance: Appropriately dressed and in no acute distress       Head: Normocephalic, atraumatic and no dysmorphic features  Ear, Nose, and Throat: Moist mucous membranes  CVS: S1S2+  Resp: No SOB, no wheeze or rhonchi  Abd: soft NTND  Extremities: No edema, no cyanosis  Skin: No bruises, no rashes     Neurological Exam:  Mental Status: Awake, alert and oriented x 1-2.  Able to follow simple commands. Able to name and repeat. fluent speech. No obvious aphasia or dysarthria noted.   Cranial Nerves: PERRL, EOMI, VFFC, sensation V1-V3 intact,  no obvious facial asymmetry, equal elevation of palate, scm/trap 5/5, tongue is midline on protrusion. hearing is grossly intact.   Motor: Normal bulk, tone and strength throughout. Fine finger movements were intact and symmetric. no tremors or drift noted.    Sensation: Intact to light touch and pinprick throughout. no right/left confusion. no extinction to tactile on DSS.   Reflexes: 1+ throughout at biceps, brachioradialis, triceps, patellars and ankles bilaterally and equal. No clonus. R toe and L toe were both downgoing.  Coordination: No dysmetria on FNF  Gait: Narrow based and steady.       I personally reviewed the below data/images/labs:            < from: CT Head No Cont (01.02.24 @ 17:45) >    ACC: [id number] EXAM:  CT BRAIN   ORDERED BY: MARANDA HAM     PROCEDURE DATE:  01/02/2024          INTERPRETATION:  .    CLINICAL INFORMATION: Confusion. Altered mental status. Dementia.    TECHNIQUE: Multiple axial CT images of the head were obtained without   contrast. Sagittal and coronal reconstructed images were acquired from   the source data.    COMPARISON: No prior CT studies of the brain are available for comparison   at this institution.    FINDINGS: There is no acute intracranial hemorrhage, mass effect, shift   of the midline structures, herniation, extra-axial fluid collection, or   hydrocephalus.    There is diffuse cerebral volume loss with prominence of the sulci,   fissures, and cisternal spaces which is normal for the patient's age.   Moderate ventriculomegaly is seen. 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    The paranasal sinuses and tympanomastoid cavities are clear. The   calvarium is intact. There is evidence of left-sided cataract removal.    IMPRESSION: No acute intracranial hemorrhage, mass effect, or shift of   the midline structures.    < end of copied text >

## 2024-01-13 RX ADMIN — QUETIAPINE FUMARATE 25 MILLIGRAM(S): 200 TABLET, FILM COATED ORAL at 22:02

## 2024-01-13 RX ADMIN — Medication 3 MILLIGRAM(S): at 22:01

## 2024-01-13 NOTE — PROGRESS NOTE ADULT - SUBJECTIVE AND OBJECTIVE BOX
Patient is a 83y old  Female who presents with a chief complaint of confusion   1/13/24      SUBJECTIVE / OVERNIGHT EVENTS:pt seen and examined  covid+    MEDICATIONS  (STANDING):  melatonin 3 milliGRAM(s) Oral at bedtime  QUEtiapine 25 milliGRAM(s) Oral <User Schedule>    MEDICATIONS  (PRN):  acetaminophen     Tablet .. 650 milliGRAM(s) Oral every 6 hours PRN Temp greater or equal to 38C (100.4F), Mild Pain (1 - 3)  OLANZapine Injectable 1.25 milliGRAM(s) IntraMuscular every 6 hours PRN agitation    Vital Signs Last 24 Hrs  T(C): 36.8 (01-13-24 @ 12:20), Max: 36.8 (01-13-24 @ 02:00)  T(F): 98.3 (01-13-24 @ 12:20), Max: 98.3 (01-13-24 @ 02:00)  HR: 66 (01-13-24 @ 12:20) (57 - 82)  BP: 115/80 (01-13-24 @ 12:20) (115/80 - 135/65)  BP(mean): --  RR: 17 (01-13-24 @ 12:20) (17 - 17)  SpO2: 98% (01-13-24 @ 12:20) (97% - 98%)          PHYSICAL EXAM:  GENERAL: NAD  NECK: Supple, No JVD  CHEST/LUNG: dec breath sounds at bases  HEART: Regular rate and rhythm; No murmurs, rubs, or gallops  ABDOMEN: Soft, Nontender, Nondistended; Bowel sounds present  EXTREMITIES:   No edema  NEUROLOGY: Alert awake confused    LABS:        Creatinine Trend:     Urine Studies:                                                                                                                                RADIOLOGY & ADDITIONAL TESTS:    Imaging Personally Reviewed:yes    Consultant(s) Notes Reviewed:  yes    Care Discussed with Consultants/Other Providers:yes

## 2024-01-14 RX ADMIN — QUETIAPINE FUMARATE 25 MILLIGRAM(S): 200 TABLET, FILM COATED ORAL at 21:37

## 2024-01-14 RX ADMIN — Medication 3 MILLIGRAM(S): at 21:37

## 2024-01-14 NOTE — PROGRESS NOTE ADULT - SUBJECTIVE AND OBJECTIVE BOX
Patient is a 83y old  Female who presents with a chief complaint of confusion   1/14/24      SUBJECTIVE / OVERNIGHT EVENTS:pt seen and examined  covid+    MEDICATIONS  (STANDING):  melatonin 3 milliGRAM(s) Oral at bedtime  QUEtiapine 25 milliGRAM(s) Oral <User Schedule>    MEDICATIONS  (PRN):  acetaminophen     Tablet .. 650 milliGRAM(s) Oral every 6 hours PRN Temp greater or equal to 38C (100.4F), Mild Pain (1 - 3)  OLANZapine Injectable 1.25 milliGRAM(s) IntraMuscular every 6 hours PRN agitation    Vital Signs Last 24 Hrs  T(C): 36.4 (01-14-24 @ 19:48), Max: 36.8 (01-14-24 @ 04:40)  T(F): 97.6 (01-14-24 @ 19:48), Max: 98.2 (01-14-24 @ 04:40)  HR: 69 (01-14-24 @ 19:48) (69 - 92)  BP: 107/54 (01-14-24 @ 19:48) (105/48 - 137/62)  BP(mean): --  RR: 18 (01-14-24 @ 19:48) (17 - 18)  SpO2: 100% (01-14-24 @ 19:48) (96% - 100%)            PHYSICAL EXAM:  GENERAL: NAD  NECK: Supple, No JVD  CHEST/LUNG: dec breath sounds at bases  HEART: Regular rate and rhythm; No murmurs, rubs, or gallops  ABDOMEN: Soft, Nontender, Nondistended; Bowel sounds present  EXTREMITIES:   No edema  NEUROLOGY: Alert awake confused    LABS:        Creatinine Trend:     Urine Studies:                                                                                                                                RADIOLOGY & ADDITIONAL TESTS:    Imaging Personally Reviewed:yes    Consultant(s) Notes Reviewed:  yes    Care Discussed with Consultants/Other Providers:yes

## 2024-01-15 PROCEDURE — 93010 ELECTROCARDIOGRAM REPORT: CPT

## 2024-01-15 RX ADMIN — QUETIAPINE FUMARATE 25 MILLIGRAM(S): 200 TABLET, FILM COATED ORAL at 21:08

## 2024-01-15 RX ADMIN — Medication 3 MILLIGRAM(S): at 21:08

## 2024-01-15 NOTE — PROGRESS NOTE ADULT - SUBJECTIVE AND OBJECTIVE BOX
Neurology Progress Note    S: Patient seen and examined. No new events overnight.       MEDICATIONS:    acetaminophen     Tablet .. 650 milliGRAM(s) Oral every 6 hours PRN  melatonin 3 milliGRAM(s) Oral at bedtime  OLANZapine Injectable 1.25 milliGRAM(s) IntraMuscular every 6 hours PRN  QUEtiapine 25 milliGRAM(s) Oral <User Schedule>      LABS:            CAPILLARY BLOOD GLUCOSE            I&O's Summary    Vital Signs Last 24 Hrs  T(C): 36.3 (15 Carlos Alberto 2024 05:01), Max: 36.7 (14 Jan 2024 14:27)  T(F): 97.4 (15 Carlos Alberto 2024 05:01), Max: 98.1 (14 Jan 2024 14:27)  HR: 68 (15 Carlos Alberto 2024 05:01) (68 - 92)  BP: 122/62 (15 Carlos Alberto 2024 05:01) (107/54 - 137/62)  BP(mean): --  RR: 18 (15 Carlos Alberto 2024 05:01) (17 - 18)  SpO2: 100% (15 Carlos Alberto 2024 05:01) (99% - 100%)    Parameters below as of 15 Carlos Alberto 2024 05:01  Patient On (Oxygen Delivery Method): room air          General Exam:   General Appearance: Appropriately dressed and in no acute distress       Head: Normocephalic, atraumatic and no dysmorphic features  Ear, Nose, and Throat: Moist mucous membranes  CVS: S1S2+  Resp: No SOB, no wheeze or rhonchi  Abd: soft NTND  Extremities: No edema, no cyanosis  Skin: No bruises, no rashes     Neurological Exam:  Mental Status: Awake, alert and oriented x 1-2.  Able to follow simple commands. Able to name and repeat. fluent speech. No obvious aphasia or dysarthria noted.   Cranial Nerves: PERRL, EOMI, VFFC, sensation V1-V3 intact,  no obvious facial asymmetry, equal elevation of palate, scm/trap 5/5, tongue is midline on protrusion. hearing is grossly intact.   Motor: Normal bulk, tone and strength throughout. Fine finger movements were intact and symmetric. no tremors or drift noted.    Sensation: Intact to light touch and pinprick throughout. no right/left confusion. no extinction to tactile on DSS.   Reflexes: 1+ throughout at biceps, brachioradialis, triceps, patellars and ankles bilaterally and equal. No clonus. R toe and L toe were both downgoing.  Coordination: No dysmetria on FNF  Gait: Narrow based and steady.       I personally reviewed the below data/images/labs:            < from: CT Head No Cont (01.02.24 @ 17:45) >    ACC: [id number] EXAM:  CT BRAIN   ORDERED BY: MARANDA HAM     PROCEDURE DATE:  01/02/2024          INTERPRETATION:  .    CLINICAL INFORMATION: Confusion. Altered mental status. Dementia.    TECHNIQUE: Multiple axial CT images of the head were obtained without   contrast. Sagittal and coronal reconstructed images were acquired from   the source data.    COMPARISON: No prior CT studies of the brain are available for comparison   at this institution.    FINDINGS: There is no acute intracranial hemorrhage, mass effect, shift   of the midline structures, herniation, extra-axial fluid collection, or   hydrocephalus.    There is diffuse cerebral volume loss with prominence of the sulci,   fissures, and cisternal spaces which is normal for the patient's age.   Moderate ventriculomegaly is seen. 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    The paranasal sinuses and tympanomastoid cavities are clear. The   calvarium is intact. There is evidence of left-sided cataract removal.    IMPRESSION: No acute intracranial hemorrhage, mass effect, or shift of   the midline structures.    < end of copied text >   Neurology Progress Note    S: Patient seen and examined. No new events overnight.       MEDICATIONS:    acetaminophen     Tablet .. 650 milliGRAM(s) Oral every 6 hours PRN  melatonin 3 milliGRAM(s) Oral at bedtime  OLANZapine Injectable 1.25 milliGRAM(s) IntraMuscular every 6 hours PRN  QUEtiapine 25 milliGRAM(s) Oral <User Schedule>      LABS:            CAPILLARY BLOOD GLUCOSE            I&O's Summary    Vital Signs Last 24 Hrs  T(C): 36.3 (15 Carlos Alberto 2024 05:01), Max: 36.7 (14 Jan 2024 14:27)  T(F): 97.4 (15 Carlos Alberto 2024 05:01), Max: 98.1 (14 Jan 2024 14:27)  HR: 68 (15 Carlos Alberto 2024 05:01) (68 - 92)  BP: 122/62 (15 Carlos Alberto 2024 05:01) (107/54 - 137/62)  BP(mean): --  RR: 18 (15 Carlos Alberto 2024 05:01) (17 - 18)  SpO2: 100% (15 Carlos Alberto 2024 05:01) (99% - 100%)    Parameters below as of 15 Carlos Alberto 2024 05:01  Patient On (Oxygen Delivery Method): room air          General Exam:   General Appearance: Appropriately dressed and in no acute distress       Head: Normocephalic, atraumatic and no dysmorphic features  Ear, Nose, and Throat: Moist mucous membranes  CVS: S1S2+  Resp: No SOB, no wheeze or rhonchi  Abd: soft NTND  Extremities: No edema, no cyanosis  Skin: No bruises, no rashes     Neurological Exam:  Mental Status: Awake, alert and oriented x 1-2.  Able to follow simple commands. Able to name and repeat. fluent speech. No obvious aphasia or dysarthria noted.   Cranial Nerves: PERRL, EOMI, VFFC, sensation V1-V3 intact,  no obvious facial asymmetry, equal elevation of palate, scm/trap 5/5, tongue is midline on protrusion. hearing is grossly intact.   Motor: Normal bulk, tone and strength throughout. Fine finger movements were intact and symmetric. no tremors or drift noted.    Sensation: Intact to light touch and pinprick throughout. no right/left confusion. no extinction to tactile on DSS.   Reflexes: 1+ throughout at biceps, brachioradialis, triceps, patellars and ankles bilaterally and equal. No clonus. R toe and L toe were both downgoing.  Coordination: No dysmetria on FNF  Gait: Narrow based and steady.       I personally reviewed the below data/images/labs:            < from: CT Head No Cont (01.02.24 @ 17:45) >    ACC: [id number] EXAM:  CT BRAIN   ORDERED BY: MARANDA HAM     PROCEDURE DATE:  01/02/2024          INTERPRETATION:  .    CLINICAL INFORMATION: Confusion. Altered mental status. Dementia.    TECHNIQUE: Multiple axial CT images of the head were obtained without   contrast. Sagittal and coronal reconstructed images were acquired from   the source data.    COMPARISON: No prior CT studies of the brain are available for comparison   at this institution.    FINDINGS: There is no acute intracranial hemorrhage, mass effect, shift   of the midline structures, herniation, extra-axial fluid collection, or   hydrocephalus.    There is diffuse cerebral volume loss with prominence of the sulci,   fissures, and cisternal spaces which is normal for the patient's age.   Moderate ventriculomegaly is seen. 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    The paranasal sinuses and tympanomastoid cavities are clear. The   calvarium is intact. There is evidence of left-sided cataract removal.    IMPRESSION: No acute intracranial hemorrhage, mass effect, or shift of   the midline structures.    < end of copied text >   Neurology Progress Note    S: Patient seen and examined. No new events overnight.       MEDICATIONS:    acetaminophen     Tablet .. 650 milliGRAM(s) Oral every 6 hours PRN  melatonin 3 milliGRAM(s) Oral at bedtime  OLANZapine Injectable 1.25 milliGRAM(s) IntraMuscular every 6 hours PRN  QUEtiapine 25 milliGRAM(s) Oral <User Schedule>      LABS:            CAPILLARY BLOOD GLUCOSE            I&O's Summary    Vital Signs Last 24 Hrs  T(C): 36.3 (15 Carlos Alberto 2024 05:01), Max: 36.7 (14 Jan 2024 14:27)  T(F): 97.4 (15 Carlos Alberto 2024 05:01), Max: 98.1 (14 Jan 2024 14:27)  HR: 68 (15 Carlos Alberto 2024 05:01) (68 - 92)  BP: 122/62 (15 Carlos Alberto 2024 05:01) (107/54 - 137/62)  BP(mean): --  RR: 18 (15 Carlos Alberto 2024 05:01) (17 - 18)  SpO2: 100% (15 Carlos Alberto 2024 05:01) (99% - 100%)    Parameters below as of 15 Carlos Alberto 2024 05:01  Patient On (Oxygen Delivery Method): room air          General Exam:   General Appearance: Appropriately dressed and in no acute distress       Head: Normocephalic, atraumatic and no dysmorphic features  Ear, Nose, and Throat: Moist mucous membranes  CVS: S1S2+  Resp: No SOB, no wheeze or rhonchi  Abd: soft NTND  Extremities: No edema, no cyanosis  Skin: No bruises, no rashes     Neurological Exam:  Mental Status: Awake, alert and oriented x 1-2.  Able to follow simple commands. Able to name and repeat. fluent speech. No obvious aphasia or dysarthria noted.   Cranial Nerves: PERRL, EOMI, VFFC, sensation V1-V3 intact,  no obvious facial asymmetry, equal elevation of palate, scm/trap 5/5, tongue is midline on protrusion. hearing is grossly intact.   Motor: Normal bulk, tone and strength throughout. Fine finger movements were intact and symmetric. no tremors or drift noted.    Sensation: Intact to light touch and pinprick throughout. no right/left confusion. no extinction to tactile on DSS.   Reflexes: 1+ throughout at biceps, brachioradialis, triceps, patellars and ankles bilaterally and equal. No clonus. R toe and L toe were both downgoing.  Coordination: No dysmetria on FNF  Gait: Narrow based and steady.       I personally reviewed the below data/images/labs:            < from: CT Head No Cont (01.02.24 @ 17:45) >    ACC: [id number] EXAM:  CT BRAIN   ORDERED BY: MARANDA HAM     PROCEDURE DATE:  01/02/2024          INTERPRETATION:  .    CLINICAL INFORMATION: Confusion. Altered mental status. Dementia.    TECHNIQUE: Multiple axial CT images of the head were obtained without   contrast. Sagittal and coronal reconstructed images were acquired from   the source data.    COMPARISON: No prior CT studies of the brain are available for comparison   at this institution.    FINDINGS: There is no acute intracranial hemorrhage, mass effect, shift   of the midline structures, herniation, extra-axial fluid collection, or   hydrocephalus.    There is diffuse cerebral volume loss with prominence of the sulci,   fissures, and cisternal spaces which is normal for the patient's age.   Moderate ventriculomegaly is seen. 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    The paranasal sinuses and tympanomastoid cavities are clear. The   calvarium is intact. There is evidence of left-sided cataract removal.    IMPRESSION: No acute intracranial hemorrhage, mass effect, or shift of   the midline structures.    < end of copied text >

## 2024-01-15 NOTE — PROGRESS NOTE ADULT - SUBJECTIVE AND OBJECTIVE BOX
Patient is a 83y old  Female who presents with a chief complaint of confusion   1/15/24      SUBJECTIVE / OVERNIGHT EVENTS:pt seen and examined  covid+    MEDICATIONS  (STANDING):  melatonin 3 milliGRAM(s) Oral at bedtime  QUEtiapine 25 milliGRAM(s) Oral <User Schedule>    MEDICATIONS  (PRN):  acetaminophen     Tablet .. 650 milliGRAM(s) Oral every 6 hours PRN Temp greater or equal to 38C (100.4F), Mild Pain (1 - 3)  OLANZapine Injectable 1.25 milliGRAM(s) IntraMuscular every 6 hours PRN agitation    Vital Signs Last 24 Hrs  T(C): 36.6 (01-15-24 @ 14:13), Max: 36.6 (01-15-24 @ 14:13)  T(F): 97.9 (01-15-24 @ 14:13), Max: 97.9 (01-15-24 @ 14:13)  HR: 57 (01-15-24 @ 14:13) (57 - 69)  BP: 112/49 (01-15-24 @ 14:13) (107/54 - 122/62)  BP(mean): --  RR: 18 (01-15-24 @ 14:13) (18 - 18)  SpO2: 100% (01-15-24 @ 14:13) (100% - 100%)    SpO2: 100% (01-14-24 @ 19:48) (96% - 100%)            PHYSICAL EXAM:  GENERAL: NAD  NECK: Supple, No JVD  CHEST/LUNG: dec breath sounds at bases  HEART: Regular rate and rhythm; No murmurs, rubs, or gallops  ABDOMEN: Soft, Nontender, Nondistended; Bowel sounds present  EXTREMITIES:   No edema  NEUROLOGY: Alert awake confused    LABS:        Creatinine Trend: 0.69 <--    Urine Studies:  Urinalysis Basic - ( 12 Jan 2024 05:00 )    Color:  / Appearance:  / SG:  / pH:   Gluc: 85 mg/dL / Ketone:   / Bili:  / Urobili:    Blood:  / Protein:  / Nitrite:    Leuk Esterase:  / RBC:  / WBC    Sq Epi:  / Non Sq Epi:  / Bacteria:                                                                                                                                         RADIOLOGY & ADDITIONAL TESTS:    Imaging Personally Reviewed:yes    Consultant(s) Notes Reviewed:  yes    Care Discussed with Consultants/Other Providers:yes

## 2024-01-16 RX ADMIN — QUETIAPINE FUMARATE 25 MILLIGRAM(S): 200 TABLET, FILM COATED ORAL at 21:43

## 2024-01-16 RX ADMIN — Medication 3 MILLIGRAM(S): at 21:49

## 2024-01-16 NOTE — PROGRESS NOTE ADULT - SUBJECTIVE AND OBJECTIVE BOX
Patient is a 83y old  Female who presents with a chief complaint of confusion   1/16/24      SUBJECTIVE / OVERNIGHT EVENTS:pt seen and examined  covid+    MEDICATIONS  (STANDING):  melatonin 3 milliGRAM(s) Oral at bedtime  QUEtiapine 25 milliGRAM(s) Oral <User Schedule>    MEDICATIONS  (PRN):  acetaminophen     Tablet .. 650 milliGRAM(s) Oral every 6 hours PRN Temp greater or equal to 38C (100.4F), Mild Pain (1 - 3)  OLANZapine Injectable 1.25 milliGRAM(s) IntraMuscular every 6 hours PRN agitation    Vital Signs Last 24 Hrs  T(C): 36.4 (01-16-24 @ 20:55), Max: 36.4 (01-16-24 @ 20:55)  T(F): 97.6 (01-16-24 @ 20:55), Max: 97.6 (01-16-24 @ 20:55)  HR: 52 (01-16-24 @ 20:55) (50 - 65)  BP: 101/45 (01-16-24 @ 20:55) (101/45 - 111/58)  BP(mean): --  RR: 18 (01-16-24 @ 20:55) (18 - 18)  SpO2: 100% (01-16-24 @ 20:55) (100% - 100%)          PHYSICAL EXAM:  GENERAL: NAD  NECK: Supple, No JVD  CHEST/LUNG: dec breath sounds at bases  HEART: Regular rate and rhythm; No murmurs, rubs, or gallops  ABDOMEN: Soft, Nontender, Nondistended; Bowel sounds present  EXTREMITIES:   No edema  NEUROLOGY: Alert awake confused    LABS:        Creatinine Trend: 0.69 <--    Urine Studies:  Urinalysis Basic - ( 12 Jan 2024 05:00 )    Color:  / Appearance:  / SG:  / pH:   Gluc: 85 mg/dL / Ketone:   / Bili:  / Urobili:    Blood:  / Protein:  / Nitrite:    Leuk Esterase:  / RBC:  / WBC    Sq Epi:  / Non Sq Epi:  / Bacteria:                                                                                                                                                             RADIOLOGY & ADDITIONAL TESTS:    Imaging Personally Reviewed:yes    Consultant(s) Notes Reviewed:  yes    Care Discussed with Consultants/Other Providers:yes

## 2024-01-16 NOTE — PROGRESS NOTE ADULT - SUBJECTIVE AND OBJECTIVE BOX
Neurology Progress Note    S: Patient seen and examined. No new events overnight.     MEDICATIONS:    acetaminophen     Tablet .. 650 milliGRAM(s) Oral every 6 hours PRN  melatonin 3 milliGRAM(s) Oral at bedtime  OLANZapine Injectable 1.25 milliGRAM(s) IntraMuscular every 6 hours PRN  QUEtiapine 25 milliGRAM(s) Oral <User Schedule>      LABS:            CAPILLARY BLOOD GLUCOSE            I&O's Summary    Vital Signs Last 24 Hrs  T(C): 36.3 (16 Jan 2024 11:31), Max: 36.6 (15 Carlos Alberto 2024 14:13)  T(F): 97.4 (16 Jan 2024 11:31), Max: 97.9 (15 Carlos Alberto 2024 14:13)  HR: 65 (16 Jan 2024 11:31) (50 - 65)  BP: 111/58 (16 Jan 2024 11:31) (106/52 - 131/64)  BP(mean): --  RR: 18 (16 Jan 2024 11:31) (18 - 18)  SpO2: 100% (16 Jan 2024 11:31) (100% - 100%)    Parameters below as of 16 Jan 2024 11:31  Patient On (Oxygen Delivery Method): room air          General Exam:   General Appearance: Appropriately dressed and in no acute distress       Head: Normocephalic, atraumatic and no dysmorphic features  Ear, Nose, and Throat: Moist mucous membranes  CVS: S1S2+  Resp: No SOB, no wheeze or rhonchi  Abd: soft NTND  Extremities: No edema, no cyanosis  Skin: No bruises, no rashes     Neurological Exam:  Mental Status: Awake, alert and oriented x 1-2.  Able to follow simple commands. Able to name and repeat. fluent speech. No obvious aphasia or dysarthria noted.   Cranial Nerves: PERRL, EOMI, VFFC, sensation V1-V3 intact,  no obvious facial asymmetry, equal elevation of palate, scm/trap 5/5, tongue is midline on protrusion. hearing is grossly intact.   Motor: Normal bulk, tone and strength throughout. Fine finger movements were intact and symmetric. no tremors or drift noted.    Sensation: Intact to light touch and pinprick throughout. no right/left confusion. no extinction to tactile on DSS.   Reflexes: 1+ throughout at biceps, brachioradialis, triceps, patellars and ankles bilaterally and equal. No clonus. R toe and L toe were both downgoing.  Coordination: No dysmetria on FNF  Gait: Narrow based and steady.       I personally reviewed the below data/images/labs:            < from: CT Head No Cont (01.02.24 @ 17:45) >    ACC: [id number] EXAM:  CT BRAIN   ORDERED BY: MARANDA HAM     PROCEDURE DATE:  01/02/2024          INTERPRETATION:  .    CLINICAL INFORMATION: Confusion. Altered mental status. Dementia.    TECHNIQUE: Multiple axial CT images of the head were obtained without   contrast. Sagittal and coronal reconstructed images were acquired from   the source data.    COMPARISON: No prior CT studies of the brain are available for comparison   at this institution.    FINDINGS: There is no acute intracranial hemorrhage, mass effect, shift   of the midline structures, herniation, extra-axial fluid collection, or   hydrocephalus.    There is diffuse cerebral volume loss with prominence of the sulci,   fissures, and cisternal spaces which is normal for the patient's age.   Moderate ventriculomegaly is seen. 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    The paranasal sinuses and tympanomastoid cavities are clear. The   calvarium is intact. There is evidence of left-sided cataract removal.    IMPRESSION: No acute intracranial hemorrhage, mass effect, or shift of   the midline structures.    < end of copied text >   Neurology Progress Note    S: Patient seen and examined. No new events overnight.     MEDICATIONS:    acetaminophen     Tablet .. 650 milliGRAM(s) Oral every 6 hours PRN  melatonin 3 milliGRAM(s) Oral at bedtime  OLANZapine Injectable 1.25 milliGRAM(s) IntraMuscular every 6 hours PRN  QUEtiapine 25 milliGRAM(s) Oral <User Schedule>      LABS:            CAPILLARY BLOOD GLUCOSE            I&O's Summary    Vital Signs Last 24 Hrs  T(C): 36.3 (16 Jan 2024 11:31), Max: 36.6 (15 Carlos Alberto 2024 14:13)  T(F): 97.4 (16 Jan 2024 11:31), Max: 97.9 (15 Carlos Alberto 2024 14:13)  HR: 65 (16 Jan 2024 11:31) (50 - 65)  BP: 111/58 (16 Jan 2024 11:31) (106/52 - 131/64)  BP(mean): --  RR: 18 (16 Jan 2024 11:31) (18 - 18)  SpO2: 100% (16 Jan 2024 11:31) (100% - 100%)    Parameters below as of 16 Jan 2024 11:31  Patient On (Oxygen Delivery Method): room air          General Exam:   General Appearance: Appropriately dressed and in no acute distress       Head: Normocephalic, atraumatic and no dysmorphic features  Ear, Nose, and Throat: Moist mucous membranes  CVS: S1S2+  Resp: No SOB, no wheeze or rhonchi  Abd: soft NTND  Extremities: No edema, no cyanosis  Skin: No bruises, no rashes     Neurological Exam:  Mental Status: Awake, alert and oriented x 1-2.  Able to follow simple commands. Able to name and repeat. fluent speech. No obvious aphasia or dysarthria noted.   Cranial Nerves: PERRL, EOMI, VFFC, sensation V1-V3 intact,  no obvious facial asymmetry, equal elevation of palate, scm/trap 5/5, tongue is midline on protrusion. hearing is grossly intact.   Motor: Normal bulk, tone and strength throughout. Fine finger movements were intact and symmetric. no tremors or drift noted.    Sensation: Intact to light touch and pinprick throughout. no right/left confusion. no extinction to tactile on DSS.   Reflexes: 1+ throughout at biceps, brachioradialis, triceps, patellars and ankles bilaterally and equal. No clonus. R toe and L toe were both downgoing.  Coordination: No dysmetria on FNF  Gait: Narrow based and steady.       I personally reviewed the below data/images/labs:            < from: CT Head No Cont (01.02.24 @ 17:45) >    ACC: [id number] EXAM:  CT BRAIN   ORDERED BY: MARANDA HAM     PROCEDURE DATE:  01/02/2024          INTERPRETATION:  .    CLINICAL INFORMATION: Confusion. Altered mental status. Dementia.    TECHNIQUE: Multiple axial CT images of the head were obtained without   contrast. Sagittal and coronal reconstructed images were acquired from   the source data.    COMPARISON: No prior CT studies of the brain are available for comparison   at this institution.    FINDINGS: There is no acute intracranial hemorrhage, mass effect, shift   of the midline structures, herniation, extra-axial fluid collection, or   hydrocephalus.    There is diffuse cerebral volume loss with prominence of the sulci,   fissures, and cisternal spaces which is normal for the patient's age.   Moderate ventriculomegaly is seen. 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    The paranasal sinuses and tympanomastoid cavities are clear. The   calvarium is intact. There is evidence of left-sided cataract removal.    IMPRESSION: No acute intracranial hemorrhage, mass effect, or shift of   the midline structures.    < end of copied text >

## 2024-01-17 RX ADMIN — QUETIAPINE FUMARATE 25 MILLIGRAM(S): 200 TABLET, FILM COATED ORAL at 22:30

## 2024-01-17 RX ADMIN — Medication 3 MILLIGRAM(S): at 22:31

## 2024-01-17 NOTE — PROGRESS NOTE ADULT - SUBJECTIVE AND OBJECTIVE BOX
Neurology Progress Note    S: Patient seen and examined. No new events overnight.       MEDICATIONS:    acetaminophen     Tablet .. 650 milliGRAM(s) Oral every 6 hours PRN  melatonin 3 milliGRAM(s) Oral at bedtime  OLANZapine Injectable 1.25 milliGRAM(s) IntraMuscular every 6 hours PRN  QUEtiapine 25 milliGRAM(s) Oral <User Schedule>      LABS:            CAPILLARY BLOOD GLUCOSE            I&O's Summary    Vital Signs Last 24 Hrs  T(C): 36.3 (17 Jan 2024 11:54), Max: 36.4 (16 Jan 2024 20:55)  T(F): 97.3 (17 Jan 2024 11:54), Max: 97.6 (16 Jan 2024 20:55)  HR: 60 (17 Jan 2024 11:54) (52 - 74)  BP: 100/50 (17 Jan 2024 11:54) (100/50 - 105/54)  BP(mean): --  RR: 19 (17 Jan 2024 11:54) (18 - 19)  SpO2: 97% (17 Jan 2024 11:54) (97% - 100%)    Parameters below as of 17 Jan 2024 11:54  Patient On (Oxygen Delivery Method): room air              General Exam:   General Appearance: Appropriately dressed and in no acute distress       Head: Normocephalic, atraumatic and no dysmorphic features  Ear, Nose, and Throat: Moist mucous membranes  CVS: S1S2+  Resp: No SOB, no wheeze or rhonchi  Abd: soft NTND  Extremities: No edema, no cyanosis  Skin: No bruises, no rashes     Neurological Exam:  Mental Status: Awake, alert and oriented x 1-2.  Able to follow simple commands. Able to name and repeat. fluent speech. No obvious aphasia or dysarthria noted.   Cranial Nerves: PERRL, EOMI, VFFC, sensation V1-V3 intact,  no obvious facial asymmetry, equal elevation of palate, scm/trap 5/5, tongue is midline on protrusion. hearing is grossly intact.   Motor: Normal bulk, tone and strength throughout. Fine finger movements were intact and symmetric. no tremors or drift noted.    Sensation: Intact to light touch and pinprick throughout. no right/left confusion. no extinction to tactile on DSS.   Reflexes: 1+ throughout at biceps, brachioradialis, triceps, patellars and ankles bilaterally and equal. No clonus. R toe and L toe were both downgoing.  Coordination: No dysmetria on FNF  Gait: Narrow based and steady.       I personally reviewed the below data/images/labs:            < from: CT Head No Cont (01.02.24 @ 17:45) >    ACC: [id number] EXAM:  CT BRAIN   ORDERED BY: MARANDA HAM     PROCEDURE DATE:  01/02/2024          INTERPRETATION:  .    CLINICAL INFORMATION: Confusion. Altered mental status. Dementia.    TECHNIQUE: Multiple axial CT images of the head were obtained without   contrast. Sagittal and coronal reconstructed images were acquired from   the source data.    COMPARISON: No prior CT studies of the brain are available for comparison   at this institution.    FINDINGS: There is no acute intracranial hemorrhage, mass effect, shift   of the midline structures, herniation, extra-axial fluid collection, or   hydrocephalus.    There is diffuse cerebral volume loss with prominence of the sulci,   fissures, and cisternal spaces which is normal for the patient's age.   Moderate ventriculomegaly is seen. 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    The paranasal sinuses and tympanomastoid cavities are clear. The   calvarium is intact. There is evidence of left-sided cataract removal.    IMPRESSION: No acute intracranial hemorrhage, mass effect, or shift of   the midline structures.    < end of copied text >

## 2024-01-17 NOTE — PROGRESS NOTE ADULT - SUBJECTIVE AND OBJECTIVE BOX
Patient is a 83y old  Female who presents with a chief complaint of confusion   1/17/24      SUBJECTIVE / OVERNIGHT EVENTS:pt seen and examined  covid+    MEDICATIONS  (STANDING):  melatonin 3 milliGRAM(s) Oral at bedtime  QUEtiapine 25 milliGRAM(s) Oral <User Schedule>    MEDICATIONS  (PRN):  acetaminophen     Tablet .. 650 milliGRAM(s) Oral every 6 hours PRN Temp greater or equal to 38C (100.4F), Mild Pain (1 - 3)  OLANZapine Injectable 1.25 milliGRAM(s) IntraMuscular every 6 hours PRN agitation    Vital Signs Last 24 Hrs  T(C): 36.3 (01-17-24 @ 21:00), Max: 36.4 (01-17-24 @ 05:12)  T(F): 97.4 (01-17-24 @ 21:00), Max: 97.6 (01-17-24 @ 05:12)  HR: 55 (01-17-24 @ 21:00) (55 - 74)  BP: 111/57 (01-17-24 @ 21:00) (100/50 - 111/57)  BP(mean): --  RR: 18 (01-17-24 @ 21:00) (18 - 19)  SpO2: 100% (01-17-24 @ 21:00) (97% - 100%)            PHYSICAL EXAM:  GENERAL: NAD  NECK: Supple, No JVD  CHEST/LUNG: dec breath sounds at bases  HEART: Regular rate and rhythm; No murmurs, rubs, or gallops  ABDOMEN: Soft, Nontender, Nondistended; Bowel sounds present  EXTREMITIES:   No edema  NEUROLOGY: Alert awake confused    LABS:        Creatinine Trend: 0.69 <--    Urine Studies:  Urinalysis Basic - ( 12 Jan 2024 05:00 )    Color:  / Appearance:  / SG:  / pH:   Gluc: 85 mg/dL / Ketone:   / Bili:  / Urobili:    Blood:  / Protein:  / Nitrite:    Leuk Esterase:  / RBC:  / WBC    Sq Epi:  / Non Sq Epi:  / Bacteria:                                                                                                                                                                     RADIOLOGY & ADDITIONAL TESTS:    Imaging Personally Reviewed:yes    Consultant(s) Notes Reviewed:  yes    Care Discussed with Consultants/Other Providers:yes

## 2024-01-18 RX ADMIN — Medication 3 MILLIGRAM(S): at 21:32

## 2024-01-18 RX ADMIN — QUETIAPINE FUMARATE 25 MILLIGRAM(S): 200 TABLET, FILM COATED ORAL at 20:31

## 2024-01-18 NOTE — PROGRESS NOTE ADULT - SUBJECTIVE AND OBJECTIVE BOX
Patient is a 83y old  Female who presents with a chief complaint of confusion   1/18/24      SUBJECTIVE / OVERNIGHT EVENTS:pt seen and examined  covid+    MEDICATIONS  (STANDING):  melatonin 3 milliGRAM(s) Oral at bedtime  QUEtiapine 25 milliGRAM(s) Oral <User Schedule>    MEDICATIONS  (PRN):  acetaminophen     Tablet .. 650 milliGRAM(s) Oral every 6 hours PRN Temp greater or equal to 38C (100.4F), Mild Pain (1 - 3)  OLANZapine Injectable 1.25 milliGRAM(s) IntraMuscular every 6 hours PRN agitation    Vital Signs Last 24 Hrs  T(C): 36.4 (01-18-24 @ 22:05), Max: 36.6 (01-18-24 @ 12:27)  T(F): 97.5 (01-18-24 @ 22:05), Max: 97.8 (01-18-24 @ 12:27)  HR: 71 (01-18-24 @ 22:05) (62 - 74)  BP: 101/57 (01-18-24 @ 22:05) (101/57 - 134/61)  BP(mean): --  RR: 18 (01-18-24 @ 22:05) (18 - 18)  SpO2: 98% (01-18-24 @ 22:05) (97% - 98%)        PHYSICAL EXAM:  GENERAL: NAD  NECK: Supple, No JVD  CHEST/LUNG: dec breath sounds at bases  HEART: Regular rate and rhythm; No murmurs, rubs, or gallops  ABDOMEN: Soft, Nontender, Nondistended; Bowel sounds present  EXTREMITIES:   No edema  NEUROLOGY: Alert awake confused    LABS:        Creatinine Trend: 0.69 <--    Urine Studies:  Urinalysis Basic - ( 12 Jan 2024 05:00 )    Color:  / Appearance:  / SG:  / pH:   Gluc: 85 mg/dL / Ketone:   / Bili:  / Urobili:    Blood:  / Protein:  / Nitrite:    Leuk Esterase:  / RBC:  / WBC    Sq Epi:  / Non Sq Epi:  / Bacteria:                                                                                                                                                                       RADIOLOGY & ADDITIONAL TESTS:    Imaging Personally Reviewed:yes    Consultant(s) Notes Reviewed:  yes    Care Discussed with Consultants/Other Providers:yes

## 2024-01-19 LAB
ANION GAP SERPL CALC-SCNC: 8 MMOL/L — SIGNIFICANT CHANGE UP (ref 7–14)
BUN SERPL-MCNC: 16 MG/DL — SIGNIFICANT CHANGE UP (ref 7–23)
CALCIUM SERPL-MCNC: 8.3 MG/DL — LOW (ref 8.4–10.5)
CHLORIDE SERPL-SCNC: 107 MMOL/L — SIGNIFICANT CHANGE UP (ref 98–107)
CO2 SERPL-SCNC: 25 MMOL/L — SIGNIFICANT CHANGE UP (ref 22–31)
CREAT SERPL-MCNC: 0.75 MG/DL — SIGNIFICANT CHANGE UP (ref 0.5–1.3)
EGFR: 79 ML/MIN/1.73M2 — SIGNIFICANT CHANGE UP
GLUCOSE SERPL-MCNC: 91 MG/DL — SIGNIFICANT CHANGE UP (ref 70–99)
HCT VFR BLD CALC: 33.5 % — LOW (ref 34.5–45)
HGB BLD-MCNC: 11.1 G/DL — LOW (ref 11.5–15.5)
MAGNESIUM SERPL-MCNC: 2 MG/DL — SIGNIFICANT CHANGE UP (ref 1.6–2.6)
MCHC RBC-ENTMCNC: 29.9 PG — SIGNIFICANT CHANGE UP (ref 27–34)
MCHC RBC-ENTMCNC: 33.1 GM/DL — SIGNIFICANT CHANGE UP (ref 32–36)
MCV RBC AUTO: 90.3 FL — SIGNIFICANT CHANGE UP (ref 80–100)
NRBC # BLD: 0 /100 WBCS — SIGNIFICANT CHANGE UP (ref 0–0)
NRBC # FLD: 0 K/UL — SIGNIFICANT CHANGE UP (ref 0–0)
PHOSPHATE SERPL-MCNC: 3.4 MG/DL — SIGNIFICANT CHANGE UP (ref 2.5–4.5)
PLATELET # BLD AUTO: 181 K/UL — SIGNIFICANT CHANGE UP (ref 150–400)
POTASSIUM SERPL-MCNC: 4 MMOL/L — SIGNIFICANT CHANGE UP (ref 3.5–5.3)
POTASSIUM SERPL-SCNC: 4 MMOL/L — SIGNIFICANT CHANGE UP (ref 3.5–5.3)
RBC # BLD: 3.71 M/UL — LOW (ref 3.8–5.2)
RBC # FLD: 13.4 % — SIGNIFICANT CHANGE UP (ref 10.3–14.5)
SODIUM SERPL-SCNC: 140 MMOL/L — SIGNIFICANT CHANGE UP (ref 135–145)
WBC # BLD: 5.4 K/UL — SIGNIFICANT CHANGE UP (ref 3.8–10.5)
WBC # FLD AUTO: 5.4 K/UL — SIGNIFICANT CHANGE UP (ref 3.8–10.5)

## 2024-01-19 RX ADMIN — QUETIAPINE FUMARATE 25 MILLIGRAM(S): 200 TABLET, FILM COATED ORAL at 19:39

## 2024-01-19 RX ADMIN — Medication 3 MILLIGRAM(S): at 21:33

## 2024-01-19 NOTE — CHART NOTE - NSCHARTNOTEFT_GEN_A_CORE
NUTRITION FOLLOW-UP:    84 y/o F with pmh of possible dementia p/w confusion. and found to have UTI.    Pt seen confused, RN reports 25-50% intake of meals and Hormel shakes twice per day. Noted skin ecchymosis, no edema per nursing flow sheet. Pt with stable weights in past 2 weeks.     Weight: 115# (1/17/24), 113.3# (1/1/24)    Labs:  01-19 Na 140 mmol/L Glu 91 mg/dL K+ 4.0 mmol/L Cr 0.75 mg/dL BUN 16 mg/dL Phos 3.4 mg/dL      Current Diet: Diet, Regular (12-29-23 @ 22:08)    Skin-intact, no edema as per RN flow sheet    MEDICATIONS  (STANDING):  melatonin 3 milliGRAM(s) Oral at bedtime  QUEtiapine 25 milliGRAM(s) Oral <User Schedule>    Estimated needs:  No changes since previous assessment    Nutrition Diagnosis:  Ongoing    RD to Remain Available:    Additional Recommendations:   Continue with current diet and supplement as ordered.  Monitor PO intake, weight trends, nutrition related lab values, BMs/GI distress, hydration status, skin integrity.       Kae Browne RDN #[id number]

## 2024-01-19 NOTE — PROGRESS NOTE ADULT - SUBJECTIVE AND OBJECTIVE BOX
Neurology Progress Note    S: Patient seen and examined. No new events overnight.     MEDICATIONS:    acetaminophen     Tablet .. 650 milliGRAM(s) Oral every 6 hours PRN  melatonin 3 milliGRAM(s) Oral at bedtime  OLANZapine Injectable 1.25 milliGRAM(s) IntraMuscular every 6 hours PRN  QUEtiapine 25 milliGRAM(s) Oral <User Schedule>      LABS:                          11.1   5.40  )-----------( 181      ( 19 Jan 2024 05:38 )             33.5     01-19    140  |  107  |  16  ----------------------------<  91  4.0   |  25  |  0.75    Ca    8.3<L>      19 Jan 2024 05:38  Phos  3.4     01-19  Mg     2.00     01-19      CAPILLARY BLOOD GLUCOSE          Urinalysis Basic - ( 19 Jan 2024 05:38 )    Color: x / Appearance: x / SG: x / pH: x  Gluc: 91 mg/dL / Ketone: x  / Bili: x / Urobili: x   Blood: x / Protein: x / Nitrite: x   Leuk Esterase: x / RBC: x / WBC x   Sq Epi: x / Non Sq Epi: x / Bacteria: x      I&O's Summary    Vital Signs Last 24 Hrs  T(C): 36.3 (19 Jan 2024 04:36), Max: 36.6 (18 Jan 2024 12:27)  T(F): 97.3 (19 Jan 2024 04:36), Max: 97.8 (18 Jan 2024 12:27)  HR: 75 (19 Jan 2024 04:36) (71 - 75)  BP: 113/57 (19 Jan 2024 04:36) (101/57 - 134/61)  BP(mean): --  RR: 18 (19 Jan 2024 04:36) (18 - 18)  SpO2: 98% (19 Jan 2024 04:36) (97% - 98%)    Parameters below as of 19 Jan 2024 04:36  Patient On (Oxygen Delivery Method): room air          General Exam:   General Appearance: Appropriately dressed and in no acute distress       Head: Normocephalic, atraumatic and no dysmorphic features  Ear, Nose, and Throat: Moist mucous membranes  CVS: S1S2+  Resp: No SOB, no wheeze or rhonchi  Abd: soft NTND  Extremities: No edema, no cyanosis  Skin: No bruises, no rashes     Neurological Exam:  Mental Status: Awake, alert and oriented x 1-2.  Able to follow simple commands. Able to name and repeat. fluent speech. No obvious aphasia or dysarthria noted.   Cranial Nerves: PERRL, EOMI, VFFC, sensation V1-V3 intact,  no obvious facial asymmetry, equal elevation of palate, scm/trap 5/5, tongue is midline on protrusion. hearing is grossly intact.   Motor: Normal bulk, tone and strength throughout. Fine finger movements were intact and symmetric. no tremors or drift noted.    Sensation: Intact to light touch and pinprick throughout. no right/left confusion. no extinction to tactile on DSS.   Reflexes: 1+ throughout at biceps, brachioradialis, triceps, patellars and ankles bilaterally and equal. No clonus. R toe and L toe were both downgoing.  Coordination: No dysmetria on FNF  Gait: Narrow based and steady.       I personally reviewed the below data/images/labs:            < from: CT Head No Cont (01.02.24 @ 17:45) >    ACC: [id number] EXAM:  CT BRAIN   ORDERED BY: MARANDA HAM     PROCEDURE DATE:  01/02/2024          INTERPRETATION:  .    CLINICAL INFORMATION: Confusion. Altered mental status. Dementia.    TECHNIQUE: Multiple axial CT images of the head were obtained without   contrast. Sagittal and coronal reconstructed images were acquired from   the source data.    COMPARISON: No prior CT studies of the brain are available for comparison   at this institution.    FINDINGS: There is no acute intracranial hemorrhage, mass effect, shift   of the midline structures, herniation, extra-axial fluid collection, or   hydrocephalus.    There is diffuse cerebral volume loss with prominence of the sulci,   fissures, and cisternal spaces which is normal for the patient's age.   Moderate ventriculomegaly is seen. 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    The paranasal sinuses and tympanomastoid cavities are clear. The   calvarium is intact. There is evidence of left-sided cataract removal.    IMPRESSION: No acute intracranial hemorrhage, mass effect, or shift of   the midline structures.    < end of copied text >

## 2024-01-19 NOTE — PROGRESS NOTE ADULT - SUBJECTIVE AND OBJECTIVE BOX
Patient is a 83y old  Female who presents with a chief complaint of confusion   1/19/24      SUBJECTIVE / OVERNIGHT EVENTS:pt seen and examined  covid+    MEDICATIONS  (STANDING):  melatonin 3 milliGRAM(s) Oral at bedtime  QUEtiapine 25 milliGRAM(s) Oral <User Schedule>    MEDICATIONS  (PRN):  acetaminophen     Tablet .. 650 milliGRAM(s) Oral every 6 hours PRN Temp greater or equal to 38C (100.4F), Mild Pain (1 - 3)  OLANZapine Injectable 1.25 milliGRAM(s) IntraMuscular every 6 hours PRN agitation    Vital Signs Last 24 Hrs  T(C): 36.6 (01-19-24 @ 20:33), Max: 37.3 (01-19-24 @ 11:45)  T(F): 97.8 (01-19-24 @ 20:33), Max: 99.1 (01-19-24 @ 11:45)  HR: 64 (01-19-24 @ 20:33) (64 - 75)  BP: 105/64 (01-19-24 @ 20:33) (105/64 - 113/57)  BP(mean): --  RR: 18 (01-19-24 @ 20:33) (18 - 18)  SpO2: 100% (01-19-24 @ 20:33) (98% - 100%)        PHYSICAL EXAM:  GENERAL: NAD  NECK: Supple, No JVD  CHEST/LUNG: dec breath sounds at bases  HEART: Regular rate and rhythm; No murmurs, rubs, or gallops  ABDOMEN: Soft, Nontender, Nondistended; Bowel sounds present  EXTREMITIES:   No edema  NEUROLOGY: Alert awake confused    LABS:  01-19    140  |  107  |  16  ----------------------------<  91  4.0   |  25  |  0.75    Ca    8.3<L>      19 Jan 2024 05:38  Phos  3.4     01-19  Mg     2.00     01-19      Creatinine Trend: 0.75 <--                        11.1   5.40  )-----------( 181      ( 19 Jan 2024 05:38 )             33.5     Urine Studies:  Urinalysis Basic - ( 19 Jan 2024 05:38 )    Color:  / Appearance:  / SG:  / pH:   Gluc: 91 mg/dL / Ketone:   / Bili:  / Urobili:    Blood:  / Protein:  / Nitrite:    Leuk Esterase:  / RBC:  / WBC    Sq Epi:  / Non Sq Epi:  / Bacteria:                                                                                                                                                                                   RADIOLOGY & ADDITIONAL TESTS:    Imaging Personally Reviewed:yes    Consultant(s) Notes Reviewed:  yes    Care Discussed with Consultants/Other Providers:yes

## 2024-01-20 RX ADMIN — QUETIAPINE FUMARATE 25 MILLIGRAM(S): 200 TABLET, FILM COATED ORAL at 22:08

## 2024-01-20 RX ADMIN — Medication 3 MILLIGRAM(S): at 22:08

## 2024-01-20 NOTE — PROGRESS NOTE ADULT - SUBJECTIVE AND OBJECTIVE BOX
Patient is a 83y old  Female who presents with a chief complaint of confusion   1/20/24      SUBJECTIVE / OVERNIGHT EVENTS:pt seen and examined    MEDICATIONS  (STANDING):  melatonin 3 milliGRAM(s) Oral at bedtime  QUEtiapine 25 milliGRAM(s) Oral <User Schedule>    MEDICATIONS  (PRN):  acetaminophen     Tablet .. 650 milliGRAM(s) Oral every 6 hours PRN Temp greater or equal to 38C (100.4F), Mild Pain (1 - 3)  OLANZapine Injectable 1.25 milliGRAM(s) IntraMuscular every 6 hours PRN agitation    Vital Signs Last 24 Hrs  T(C): 36.4 (01-20-24 @ 20:04), Max: 36.7 (01-20-24 @ 13:16)  T(F): 97.6 (01-20-24 @ 20:04), Max: 98.1 (01-20-24 @ 13:16)  HR: 66 (01-20-24 @ 20:04) (64 - 67)  BP: 109/62 (01-20-24 @ 20:04) (99/78 - 113/60)  BP(mean): --  RR: 18 (01-20-24 @ 20:04) (18 - 18)  SpO2: 98% (01-20-24 @ 20:04) (98% - 100%)      PHYSICAL EXAM:  GENERAL: NAD  NECK: Supple, No JVD  CHEST/LUNG: dec breath sounds at bases  HEART: Regular rate and rhythm; No murmurs, rubs, or gallops  ABDOMEN: Soft, Nontender, Nondistended; Bowel sounds present  EXTREMITIES:   No edema  NEUROLOGY: Alert awake confused    LABS:  01-19    140  |  107  |  16  ----------------------------<  91  4.0   |  25  |  0.75    Ca    8.3<L>      19 Jan 2024 05:38  Phos  3.4     01-19  Mg     2.00     01-19      Creatinine Trend: 0.75 <--                        11.1   5.40  )-----------( 181      ( 19 Jan 2024 05:38 )             33.5     Urine Studies:  Urinalysis Basic - ( 19 Jan 2024 05:38 )    Color:  / Appearance:  / SG:  / pH:   Gluc: 91 mg/dL / Ketone:   / Bili:  / Urobili:    Blood:  / Protein:  / Nitrite:    Leuk Esterase:  / RBC:  / WBC    Sq Epi:  / Non Sq Epi:  / Bacteria:                                                                                                                                                                                   RADIOLOGY & ADDITIONAL TESTS:    Imaging Personally Reviewed:yes    Consultant(s) Notes Reviewed:  yes    Care Discussed with Consultants/Other Providers:yes

## 2024-01-21 RX ADMIN — Medication 3 MILLIGRAM(S): at 21:26

## 2024-01-21 RX ADMIN — QUETIAPINE FUMARATE 25 MILLIGRAM(S): 200 TABLET, FILM COATED ORAL at 21:26

## 2024-01-21 NOTE — PROGRESS NOTE ADULT - SUBJECTIVE AND OBJECTIVE BOX
Patient is a 83y old  Female who presents with a chief complaint of confusion   1/21/24      SUBJECTIVE / OVERNIGHT EVENTS:pt seen and examined    MEDICATIONS  (STANDING):  melatonin 3 milliGRAM(s) Oral at bedtime  QUEtiapine 25 milliGRAM(s) Oral <User Schedule>    MEDICATIONS  (PRN):  acetaminophen     Tablet .. 650 milliGRAM(s) Oral every 6 hours PRN Temp greater or equal to 38C (100.4F), Mild Pain (1 - 3)  OLANZapine Injectable 1.25 milliGRAM(s) IntraMuscular every 6 hours PRN agitation    Vital Signs Last 24 Hrs  T(C): 37.1 (01-21-24 @ 11:00), Max: 37.1 (01-21-24 @ 11:00)  T(F): 98.8 (01-21-24 @ 11:00), Max: 98.8 (01-21-24 @ 11:00)  HR: 74 (01-21-24 @ 11:00) (55 - 74)  BP: 100/70 (01-21-24 @ 11:00) (100/70 - 103/57)  BP(mean): --  RR: 18 (01-21-24 @ 11:00) (18 - 18)  SpO2: 98% (01-21-24 @ 11:00) (98% - 100%)    PHYSICAL EXAM:  GENERAL: NAD  NECK: Supple, No JVD  CHEST/LUNG: dec breath sounds at bases  HEART: Regular rate and rhythm; No murmurs, rubs, or gallops  ABDOMEN: Soft, Nontender, Nondistended; Bowel sounds present  EXTREMITIES:   No edema  NEUROLOGY: Alert awake confused    LABS:        Creatinine Trend: 0.75 <--    Urine Studies:  Urinalysis Basic - ( 19 Jan 2024 05:38 )    Color:  / Appearance:  / SG:  / pH:   Gluc: 91 mg/dL / Ketone:   / Bili:  / Urobili:    Blood:  / Protein:  / Nitrite:    Leuk Esterase:  / RBC:  / WBC    Sq Epi:  / Non Sq Epi:  / Bacteria:                                                                                                                                                                                           RADIOLOGY & ADDITIONAL TESTS:    Imaging Personally Reviewed:yes    Consultant(s) Notes Reviewed:  yes    Care Discussed with Consultants/Other Providers:yes

## 2024-01-22 PROCEDURE — 93010 ELECTROCARDIOGRAM REPORT: CPT

## 2024-01-22 RX ADMIN — Medication 3 MILLIGRAM(S): at 21:29

## 2024-01-22 RX ADMIN — QUETIAPINE FUMARATE 25 MILLIGRAM(S): 200 TABLET, FILM COATED ORAL at 21:29

## 2024-01-22 NOTE — PROGRESS NOTE ADULT - SUBJECTIVE AND OBJECTIVE BOX
Patient is a 83y old  Female who presents with a chief complaint of confusion   1/22/24      SUBJECTIVE / OVERNIGHT EVENTS:pt seen and examined    MEDICATIONS  (STANDING):  melatonin 3 milliGRAM(s) Oral at bedtime  QUEtiapine 25 milliGRAM(s) Oral <User Schedule>    MEDICATIONS  (PRN):  acetaminophen     Tablet .. 650 milliGRAM(s) Oral every 6 hours PRN Temp greater or equal to 38C (100.4F), Mild Pain (1 - 3)  OLANZapine Injectable 1.25 milliGRAM(s) IntraMuscular every 6 hours PRN agitation    Vital Signs Last 24 Hrs  T(C): 36.8 (01-22-24 @ 20:28), Max: 36.8 (01-22-24 @ 11:44)  T(F): 98.3 (01-22-24 @ 20:28), Max: 98.3 (01-22-24 @ 20:28)  HR: 58 (01-22-24 @ 20:28) (58 - 69)  BP: 108/64 (01-22-24 @ 20:28) (108/64 - 141/67)  BP(mean): --  RR: 18 (01-22-24 @ 20:28) (18 - 18)  SpO2: 97% (01-22-24 @ 20:28) (97% - 100%)      PHYSICAL EXAM:  GENERAL: NAD  NECK: Supple, No JVD  CHEST/LUNG: dec breath sounds at bases  HEART: Regular rate and rhythm; No murmurs, rubs, or gallops  ABDOMEN: Soft, Nontender, Nondistended; Bowel sounds present  EXTREMITIES:   No edema  NEUROLOGY: Alert awake confused    LABS:        Creatinine Trend: 0.75 <--    Urine Studies:  Urinalysis Basic - ( 19 Jan 2024 05:38 )    Color:  / Appearance:  / SG:  / pH:   Gluc: 91 mg/dL / Ketone:   / Bili:  / Urobili:    Blood:  / Protein:  / Nitrite:    Leuk Esterase:  / RBC:  / WBC    Sq Epi:  / Non Sq Epi:  / Bacteria:                                                                                                                                                                                                         RADIOLOGY & ADDITIONAL TESTS:    Imaging Personally Reviewed:yes    Consultant(s) Notes Reviewed:  yes    Care Discussed with Consultants/Other Providers:yes

## 2024-01-23 RX ORDER — OLANZAPINE 15 MG/1
1.25 TABLET, FILM COATED ORAL ONCE
Refills: 0 | Status: COMPLETED | OUTPATIENT
Start: 2024-01-23 | End: 2024-01-23

## 2024-01-23 RX ADMIN — Medication 3 MILLIGRAM(S): at 20:11

## 2024-01-23 RX ADMIN — OLANZAPINE 1.25 MILLIGRAM(S): 15 TABLET, FILM COATED ORAL at 14:44

## 2024-01-23 RX ADMIN — QUETIAPINE FUMARATE 25 MILLIGRAM(S): 200 TABLET, FILM COATED ORAL at 20:11

## 2024-01-23 NOTE — PROGRESS NOTE ADULT - SUBJECTIVE AND OBJECTIVE BOX
Patient is a 83y old  Female who presents with a chief complaint of confusion   1/23/24      SUBJECTIVE / OVERNIGHT EVENTS:pt seen and examined    MEDICATIONS  (STANDING):  melatonin 3 milliGRAM(s) Oral at bedtime  QUEtiapine 25 milliGRAM(s) Oral <User Schedule>    MEDICATIONS  (PRN):  acetaminophen     Tablet .. 650 milliGRAM(s) Oral every 6 hours PRN Temp greater or equal to 38C (100.4F), Mild Pain (1 - 3)  OLANZapine Injectable 1.25 milliGRAM(s) IntraMuscular every 6 hours PRN agitation    Vital Signs Last 24 Hrs  T(C): 36.4 (01-23-24 @ 11:56), Max: 36.8 (01-22-24 @ 20:28)  T(F): 97.6 (01-23-24 @ 11:56), Max: 98.3 (01-22-24 @ 20:28)  HR: 66 (01-23-24 @ 11:56) (58 - 66)  BP: 120/65 (01-23-24 @ 11:56) (108/64 - 120/65)  BP(mean): --  RR: 18 (01-23-24 @ 11:56) (18 - 18)  SpO2: 100% (01-23-24 @ 11:56) (97% - 100%)        PHYSICAL EXAM:  GENERAL: NAD  NECK: Supple, No JVD  CHEST/LUNG: dec breath sounds at bases  HEART: Regular rate and rhythm; No murmurs, rubs, or gallops  ABDOMEN: Soft, Nontender, Nondistended; Bowel sounds present  EXTREMITIES:   No edema  NEUROLOGY: Alert awake confused    LABS:        Creatinine Trend: 0.75 <--    Urine Studies:  Urinalysis Basic - ( 19 Jan 2024 05:38 )    Color:  / Appearance:  / SG:  / pH:   Gluc: 91 mg/dL / Ketone:   / Bili:  / Urobili:    Blood:  / Protein:  / Nitrite:    Leuk Esterase:  / RBC:  / WBC    Sq Epi:  / Non Sq Epi:  / Bacteria:                                                                                                                                                                                                                   RADIOLOGY & ADDITIONAL TESTS:    Imaging Personally Reviewed:yes    Consultant(s) Notes Reviewed:  yes    Care Discussed with Consultants/Other Providers:yes

## 2024-01-23 NOTE — PROGRESS NOTE ADULT - ASSESSMENT
Patient Instructions by Shana Gonzalez DO at 10/10/17 02:36 PM     Author:  Shana Gonzalez DO Service:  (none) Author Type:  Physician     Filed:  10/10/17 02:37 PM Encounter Date:  10/10/2017 Status:  Signed     :  Shana Gonzalez DO (Physician)            1. get stress test  2. 2 weeks from now get labs done  3. See Hematologist for blood levels.   4. See Gastroenterologist for elevate liver tests. (can do this after surgery)           Revision History        User Key Date/Time User Provider Type Action    > [N/A] 10/10/17 02:37 PM Shana Gonzalez DO Physician Sign             82 y/o F with pmh of possible dementia p/w confusion. and found to have UTI.

## 2024-01-24 PROCEDURE — 93010 ELECTROCARDIOGRAM REPORT: CPT

## 2024-01-24 PROCEDURE — 99232 SBSQ HOSP IP/OBS MODERATE 35: CPT

## 2024-01-24 RX ORDER — QUETIAPINE FUMARATE 200 MG/1
50 TABLET, FILM COATED ORAL
Refills: 0 | Status: DISCONTINUED | OUTPATIENT
Start: 2024-01-24 | End: 2024-03-07

## 2024-01-24 RX ADMIN — Medication 3 MILLIGRAM(S): at 20:30

## 2024-01-24 RX ADMIN — QUETIAPINE FUMARATE 50 MILLIGRAM(S): 200 TABLET, FILM COATED ORAL at 20:30

## 2024-01-24 NOTE — BH CONSULTATION LIAISON PROGRESS NOTE - NSBHFUPINTERVALHXFT_PSY_A_CORE
Chart reviewed. No overnight events. received Olanzapine 1.25 mg PRN for agitation. She is alert and orientedX1. confused but has no complaints reports sleeping and eating well.  Chart reviewed. No overnight events. received Olanzapine 1.25 mg PO PRN for agitation. She is alert and orientedX1. confused but has no complaints reports sleeping and eating well. Denies AVh, denies si and hi

## 2024-01-24 NOTE — PROGRESS NOTE ADULT - SUBJECTIVE AND OBJECTIVE BOX
Patient is a 83y old  Female who presents with a chief complaint of confusion   1/24/24      SUBJECTIVE / OVERNIGHT EVENTS:pt seen and examined    MEDICATIONS  (STANDING):  melatonin 3 milliGRAM(s) Oral at bedtime  QUEtiapine 50 milliGRAM(s) Oral <User Schedule>    MEDICATIONS  (PRN):  acetaminophen     Tablet .. 650 milliGRAM(s) Oral every 6 hours PRN Temp greater or equal to 38C (100.4F), Mild Pain (1 - 3)  OLANZapine Injectable 1.25 milliGRAM(s) IntraMuscular every 6 hours PRN agitation    Vital Signs Last 24 Hrs  T(C): 36.6 (01-24-24 @ 19:52), Max: 36.6 (01-24-24 @ 05:00)  T(F): 97.8 (01-24-24 @ 19:52), Max: 97.9 (01-24-24 @ 05:00)  HR: 75 (01-24-24 @ 19:52) (62 - 75)  BP: 129/67 (01-24-24 @ 19:52) (107/57 - 129/67)  BP(mean): --  RR: 18 (01-24-24 @ 19:52) (18 - 18)  SpO2: 100% (01-24-24 @ 19:52) (100% - 100%)      PHYSICAL EXAM:  GENERAL: NAD  NECK: Supple, No JVD  CHEST/LUNG: dec breath sounds at bases  HEART: Regular rate and rhythm; No murmurs, rubs, or gallops  ABDOMEN: Soft, Nontender, Nondistended; Bowel sounds present  EXTREMITIES:   No edema  NEUROLOGY: Alert awake confused    LABS:        Creatinine Trend: 0.75 <--    Urine Studies:  Urinalysis Basic - ( 19 Jan 2024 05:38 )    Color:  / Appearance:  / SG:  / pH:   Gluc: 91 mg/dL / Ketone:   / Bili:  / Urobili:    Blood:  / Protein:  / Nitrite:    Leuk Esterase:  / RBC:  / WBC    Sq Epi:  / Non Sq Epi:  / Bacteria:                                                                                                                                                                                                               RADIOLOGY & ADDITIONAL TESTS:    Imaging Personally Reviewed:yes    Consultant(s) Notes Reviewed:  yes    Care Discussed with Consultants/Other Providers:yes

## 2024-01-24 NOTE — BH CONSULTATION LIAISON PROGRESS NOTE - OTHER
paranoia " keeping her against her well because she is not doing the job right" but patient was not able to elaborate more.  concrete/goal directed smiling frequently during an interview able to name pen

## 2024-01-24 NOTE — BH CONSULTATION LIAISON PROGRESS NOTE - NSBHASSESSMENTFT_PSY_ALL_CORE
82 y/o F with PMH of possible dementia p/w confusion.  Pt was found by bystander (who is a nurse) wandering by a local grocery store and appeared confused.  Bystander brought pt to her home and noted pt's home was in a poor state, and did not seem safe for pt to live alone.  Pt brought to ED by EMS.     On evaluation patient is alert, oriented X1, calm but confused, needed a lot of redirection but was not agitated. in the ED patient found to have UTI and antibiotic was started. with no know past psychiatric history and her history of infection it is most likely to be delirium with underlying dementia. currently patient is calm, did not need any PRNs for the last 2 days.     1/5: Patient orientedX1, confused, not sleeping and getting agitated at night where she requires PRNs.   1/8: Patient is oriented x1. better sleep and no PRNS. Plan to continue Seroquel 25 mg at 8 pm.   1/24: Patient is confused, received Olanzapine PRN for agitation in the last 24 hours. Can increase Seroquel to 75 mg at 8 pm.     Plan:   - Routine observation   - Medical management per primary team   - Increase Seroquel 25 mg PO at 8 pm.   - For agitation can use Olanzapine 1.25 mg PRN Q6hr. Monitor QTC    Dispo: No role for inpatient psychiatry admission but patient might benefit from higher level of dispo like a nursing home. 82 y/o F with PMH of possible dementia p/w confusion.  Pt was found by bystander (who is a nurse) wandering by a local grocery store and appeared confused.  Bystander brought pt to her home and noted pt's home was in a poor state, and did not seem safe for pt to live alone.  Pt brought to ED by EMS.     On evaluation patient is alert, oriented X1, calm but confused, needed a lot of redirection but was not agitated. in the ED patient found to have UTI and antibiotic was started. with no know past psychiatric history and her history of infection it is most likely to be delirium with underlying dementia. currently patient is calm, did not need any PRNs for the last 2 days.     1/5: Patient orientedX1, confused, not sleeping and getting agitated at night where she requires PRNs.   1/8: Patient is oriented x1. better sleep and no PRNS. Plan to continue Seroquel 25 mg at 8 pm.   1/24: Patient is confused, received Olanzapine PRN PO X1 for agitation in the last 24 hours. Can increase Seroquel to 50 mg at 8 pm.     Plan:   - Routine observation   - Medical management per primary team   - Increase Seroquel 50 mg PO at 8 pm if qtc <500.   - For agitation can use Olanzapine 1.25 mg PO/IM PRN Q6hr. Monitor QTC    Dispo: No role for inpatient psychiatry admission but patient might benefit from higher level of dispo like a nursing home. 82 y/o F with PMH of possible dementia p/w confusion.  Pt was found by bystander (who is a nurse) wandering by a local grocery store and appeared confused.  Bystander brought pt to her home and noted pt's home was in a poor state, and did not seem safe for pt to live alone.  Pt brought to ED by EMS.     On evaluation patient is alert, oriented X1, calm but confused, needed a lot of redirection but was not agitated. in the ED patient found to have UTI and antibiotic was started. with no know past psychiatric history and her history of infection it is most likely to be delirium with underlying dementia. currently patient is calm, did not need any PRNs for the last 2 days.     1/5: Patient orientedX1, confused, not sleeping and getting agitated at night where she requires PRNs.   1/8: Patient is oriented x1. better sleep and no PRNS. Plan to continue Seroquel 25 mg at 8 pm.   1/24: Patient is confused, received Olanzapine PRN PO X1 for agitation in the last 24 hours. Can increase Seroquel to 50 mg at 8 pm.     Plan:   - Routine observation   - Medical management per primary team   - Increase Seroquel 50 mg PO at 8 pm if qtc <500.   - Does not have capacity to participate in safe discharge planning   - For agitation can use Olanzapine 1.25 mg PO/IM PRN Q6hr. Monitor QTC    Dispo: No role for inpatient psychiatry admission but patient might benefit from higher level of dispo like a nursing home.

## 2024-01-25 RX ADMIN — QUETIAPINE FUMARATE 50 MILLIGRAM(S): 200 TABLET, FILM COATED ORAL at 21:10

## 2024-01-25 RX ADMIN — OLANZAPINE 1.25 MILLIGRAM(S): 15 TABLET, FILM COATED ORAL at 16:35

## 2024-01-25 RX ADMIN — Medication 3 MILLIGRAM(S): at 21:11

## 2024-01-25 NOTE — PROGRESS NOTE ADULT - ASSESSMENT
Attempt # 1  Called #   Telephone Information:   Mobile [phone]       Left a non detailed VM to call back at [phone] and ask for any available Triage Nurse.    Koffi Jones RN   [facility]     82 y/o F with pmh of possible dementia p/w confusion. and found to have UTI.

## 2024-01-26 RX ADMIN — QUETIAPINE FUMARATE 50 MILLIGRAM(S): 200 TABLET, FILM COATED ORAL at 21:04

## 2024-01-26 RX ADMIN — Medication 3 MILLIGRAM(S): at 21:04

## 2024-01-26 NOTE — PROGRESS NOTE ADULT - SUBJECTIVE AND OBJECTIVE BOX
Patient is a 83y old  Female who presents with a chief complaint of confusion   1/26/24      SUBJECTIVE / OVERNIGHT EVENTS:pt seen and examined    MEDICATIONS  (STANDING):  melatonin 3 milliGRAM(s) Oral at bedtime  QUEtiapine 50 milliGRAM(s) Oral <User Schedule>    MEDICATIONS  (PRN):  acetaminophen     Tablet .. 650 milliGRAM(s) Oral every 6 hours PRN Temp greater or equal to 38C (100.4F), Mild Pain (1 - 3)  OLANZapine Injectable 1.25 milliGRAM(s) IntraMuscular every 6 hours PRN agitation    Vital Signs Last 24 Hrs  T(C): 36.9 (01-26-24 @ 19:47), Max: 36.9 (01-26-24 @ 19:47)  T(F): 98.4 (01-26-24 @ 19:47), Max: 98.4 (01-26-24 @ 19:47)  HR: 74 (01-26-24 @ 19:47) (57 - 74)  BP: 106/60 (01-26-24 @ 19:47) (106/52 - 106/60)  BP(mean): --  RR: 18 (01-26-24 @ 19:47) (18 - 18)  SpO2: 99% (01-26-24 @ 19:47) (99% - 100%)          PHYSICAL EXAM:  GENERAL: NAD  NECK: Supple, No JVD  CHEST/LUNG: dec breath sounds at bases  HEART: Regular rate and rhythm; No murmurs, rubs, or gallops  ABDOMEN: Soft, Nontender, Nondistended; Bowel sounds present  EXTREMITIES:   No edema  NEUROLOGY: Alert awake confused    LABS:        Creatinine Trend:     Urine Studies:                                                                                                                                                                                                                              RADIOLOGY & ADDITIONAL TESTS:    Imaging Personally Reviewed:yes    Consultant(s) Notes Reviewed:  yes    Care Discussed with Consultants/Other Providers:yes

## 2024-01-26 NOTE — PROGRESS NOTE ADULT - PROBLEM SELECTOR PROBLEM 3
Need for case management follow-up

## 2024-01-26 NOTE — PROGRESS NOTE ADULT - PROBLEM SELECTOR PLAN 3
Medication Changes:  Continue to titrate up your Remodulin as planned.   No other changes today.     Patient Instructions:  1. Continue staying active and eat a heart healthy diet.    2. Please keep current list of medications with you at all times.    3. Remember to weigh yourself daily after voiding and before you consume any food or beverages and log the numbers.  If you have gained 2 pounds overnight or 5 pounds in a week contact us immediately for medication adjustments or further instructions.    4. **Please call us immediately if you have any syncope (fainting or passing out), chest pain, edema (swelling or weight gain), or decline in your functional status (general decline in how you are feeling).    Follow up Appointment Information:  Will plan for a 1 month video visit with Shawna.  Tentative plan is to return in about 2 months with repeat heart catheterization and see Dr. Mathew.     Results:    We are located on the third floor of the [facility] ([facility] [place]) on the [facility].  Our address is     [address] [place] [address] on [address]rd [place] [address]    Thank you for allowing us to be a part of your care here at the [facility]    If you have questions or concerns please contact us at:    Elan Arnett RN, BSN   Lexus Balbuena (Schedule,Prior Auth)  Nurse Coordinator     Clinic   Pulmonary Hypertension   Pulmonary Hypertension  [facility]  [facility]  (Phone)[phone]    (Phone) [phone]        (Fax) [phone]    ** Please note that you will NOT receive a reminder call regarding your scheduled testing, reminder calls are for provider appointments only.  If you are scheduled for testing within the [facility] system you may receive a call regarding pre-registration for billing purposes only.**     Remember to weigh yourself daily after voiding and before you 
Pt has outpatient  attempting to obtain more services for patient  - Will need CM and SW in hospital to arrange for services.
consume any food or beverages and log the numbers.  If you have gained/lost 2 pounds overnight or 5 pounds in a week contact us immediately for medication adjustments or further instructions.   **Please call us immediately if you have any syncope, chest pain, edema, or decline in your functional status.    Support Group:  Pulmonary Hypertension Association  Https://www.phassociation.org/  **Look at the Events Tab** They even have Support Groups that you can call into    [facility] Support Group  Second Saturday of the Month from 1-3 PM   Location: [address] [place] [id number]  Leader: Jessenia Moeller and Marielos Loo  Phone: [phone] or [phone]  Email: [email or web address]     
Pt has outpatient  attempting to obtain more services for patient  - Will need CM and SW in hospital to arrange for services.

## 2024-01-27 RX ADMIN — Medication 3 MILLIGRAM(S): at 20:41

## 2024-01-27 RX ADMIN — QUETIAPINE FUMARATE 50 MILLIGRAM(S): 200 TABLET, FILM COATED ORAL at 20:41

## 2024-01-27 RX ADMIN — OLANZAPINE 1.25 MILLIGRAM(S): 15 TABLET, FILM COATED ORAL at 18:01

## 2024-01-27 NOTE — PROGRESS NOTE ADULT - SUBJECTIVE AND OBJECTIVE BOX
Patient is a 83y old  Female who presents with a chief complaint of confusion   1/27/24      SUBJECTIVE / OVERNIGHT EVENTS:pt seen and examined    MEDICATIONS  (STANDING):  melatonin 3 milliGRAM(s) Oral at bedtime  QUEtiapine 50 milliGRAM(s) Oral <User Schedule>    MEDICATIONS  (PRN):  acetaminophen     Tablet .. 650 milliGRAM(s) Oral every 6 hours PRN Temp greater or equal to 38C (100.4F), Mild Pain (1 - 3)  OLANZapine Injectable 1.25 milliGRAM(s) IntraMuscular every 6 hours PRN agitation    Vital Signs Last 24 Hrs  T(C): 36.7 (01-27-24 @ 20:37), Max: 36.7 (01-27-24 @ 05:00)  T(F): 98.1 (01-27-24 @ 20:37), Max: 98.1 (01-27-24 @ 20:37)  HR: 88 (01-27-24 @ 20:37) (65 - 88)  BP: 122/70 (01-27-24 @ 20:37) (109/80 - 122/70)  BP(mean): --  RR: 18 (01-27-24 @ 20:37) (18 - 18)  SpO2: 100% (01-27-24 @ 20:37) (100% - 100%)        PHYSICAL EXAM:  GENERAL: NAD  NECK: Supple, No JVD  CHEST/LUNG: dec breath sounds at bases  HEART: Regular rate and rhythm; No murmurs, rubs, or gallops  ABDOMEN: Soft, Nontender, Nondistended; Bowel sounds present  EXTREMITIES:   No edema  NEUROLOGY: Alert awake confused    LABS:        Creatinine Trend:     Urine Studies:                                                                                                                                                                                                                                        RADIOLOGY & ADDITIONAL TESTS:    Imaging Personally Reviewed:yes    Consultant(s) Notes Reviewed:  yes    Care Discussed with Consultants/Other Providers:yes

## 2024-01-28 RX ADMIN — Medication 3 MILLIGRAM(S): at 21:22

## 2024-01-28 RX ADMIN — QUETIAPINE FUMARATE 50 MILLIGRAM(S): 200 TABLET, FILM COATED ORAL at 21:22

## 2024-01-28 NOTE — PROGRESS NOTE ADULT - SUBJECTIVE AND OBJECTIVE BOX
Patient is a 83y old  Female who presents with a chief complaint of confusion   1/28/24      SUBJECTIVE / OVERNIGHT EVENTS:pt seen and examined    MEDICATIONS  (STANDING):  melatonin 3 milliGRAM(s) Oral at bedtime  QUEtiapine 50 milliGRAM(s) Oral <User Schedule>    MEDICATIONS  (PRN):  acetaminophen     Tablet .. 650 milliGRAM(s) Oral every 6 hours PRN Temp greater or equal to 38C (100.4F), Mild Pain (1 - 3)  OLANZapine Injectable 1.25 milliGRAM(s) IntraMuscular every 6 hours PRN agitation    MEDICATIONS  (STANDING):  melatonin 3 milliGRAM(s) Oral at bedtime  QUEtiapine 50 milliGRAM(s) Oral <User Schedule>    MEDICATIONS  (PRN):  acetaminophen     Tablet .. 650 milliGRAM(s) Oral every 6 hours PRN Temp greater or equal to 38C (100.4F), Mild Pain (1 - 3)  OLANZapine Injectable 1.25 milliGRAM(s) IntraMuscular every 6 hours PRN agitation    Vital Signs Last 24 Hrs  T(C): 36.7 (01-28-24 @ 22:37), Max: 36.8 (01-28-24 @ 12:46)  T(F): 98.1 (01-28-24 @ 22:37), Max: 98.3 (01-28-24 @ 12:46)  HR: 86 (01-28-24 @ 22:37) (60 - 86)  BP: 125/70 (01-28-24 @ 22:37) (107/66 - 125/70)  BP(mean): --  RR: 19 (01-28-24 @ 22:37) (18 - 19)  SpO2: 100% (01-28-24 @ 22:37) (98% - 100%)        PHYSICAL EXAM:  GENERAL: NAD  NECK: Supple, No JVD  CHEST/LUNG: dec breath sounds at bases  HEART: Regular rate and rhythm; No murmurs, rubs, or gallops  ABDOMEN: Soft, Nontender, Nondistended; Bowel sounds present  EXTREMITIES:   No edema  NEUROLOGY: Alert awake confused    LABS:        Creatinine Trend:     Urine Studies:                                                                                                                                                                                                                                        RADIOLOGY & ADDITIONAL TESTS:    Imaging Personally Reviewed:yes    Consultant(s) Notes Reviewed:  yes    Care Discussed with Consultants/Other Providers:yes

## 2024-01-29 RX ADMIN — QUETIAPINE FUMARATE 50 MILLIGRAM(S): 200 TABLET, FILM COATED ORAL at 18:01

## 2024-01-29 RX ADMIN — Medication 3 MILLIGRAM(S): at 21:37

## 2024-01-29 NOTE — PROGRESS NOTE ADULT - SUBJECTIVE AND OBJECTIVE BOX
Patient is a 83y old  Female who presents with a chief complaint of confusion   1/29/24      SUBJECTIVE / OVERNIGHT EVENTS:pt seen and examined    MEDICATIONS  (STANDING):  melatonin 3 milliGRAM(s) Oral at bedtime  QUEtiapine 50 milliGRAM(s) Oral <User Schedule>    MEDICATIONS  (PRN):  acetaminophen     Tablet .. 650 milliGRAM(s) Oral every 6 hours PRN Temp greater or equal to 38C (100.4F), Mild Pain (1 - 3)  OLANZapine Injectable 1.25 milliGRAM(s) IntraMuscular every 6 hours PRN agitation    Vital Signs Last 24 Hrs  T(C): 36.4 (01-29-24 @ 11:50), Max: 36.8 (01-29-24 @ 05:14)  T(F): 97.5 (01-29-24 @ 11:50), Max: 98.3 (01-29-24 @ 05:14)  HR: 70 (01-29-24 @ 11:50) (70 - 88)  BP: 139/85 (01-29-24 @ 11:50) (112/70 - 139/85)  BP(mean): --  RR: 18 (01-29-24 @ 11:50) (18 - 19)  SpO2: 100% (01-29-24 @ 11:50) (99% - 100%)          PHYSICAL EXAM:  GENERAL: NAD  NECK: Supple, No JVD  CHEST/LUNG: dec breath sounds at bases  HEART: Regular rate and rhythm; No murmurs, rubs, or gallops  ABDOMEN: Soft, Nontender, Nondistended; Bowel sounds present  EXTREMITIES:   No edema  NEUROLOGY: Alert awake confused  ambulating on floor without difficulty     LABS:        Creatinine Trend:     Urine Studies:                                                                                                                                                                                                                                        RADIOLOGY & ADDITIONAL TESTS:    Imaging Personally Reviewed:yes    Consultant(s) Notes Reviewed:  yes    Care Discussed with Consultants/Other Providers:yes

## 2024-01-29 NOTE — PROGRESS NOTE ADULT - ASSESSMENT
82 y/o F with pmh of possible dementia p/w confusion, found wandering in the grocery store confused and brought to ED for eval. Found to have UTI, on Ceftriaxone  Currently confused, does not know where she is, witnessed roaming hallways trying to get into others rooms.   Labs wnl, slight leukocytosis that has since normalized  CTH with age related volume loss, no acute pathology \  B12 541. Folate 17.6, RPR neg    Encephalopathy likely due to UTI superimposed on dementia    Plan:  - CTh reviewed  - check  TSH  - s/p Abx for UTI  - delirium precautions:   [] please have adequate sleeping environment for the patient, light on, curtains open, TV on during the day with regular stimulation and out of bed to chair if possible. During the night, close curtains, TV off, lights off, and minimize interruptions (limit blood draws and vital sign checks if possible). Regular interaction with patient with staff (introducing selves), frequent reorientation, and encouragement of visitors as allowed by hospital and unit policy. Regular toileting.  [] limitation of sedating medications as tolerated  [] maintain electrolytes within normal limits   - appreciate psych recs  - PT/OT  - DVT ppx  dc planning

## 2024-01-29 NOTE — PROGRESS NOTE ADULT - SUBJECTIVE AND OBJECTIVE BOX
Neurology Progress Note    S: Patient seen and examined. No new events overnight.         Medications: MEDICATIONS  (STANDING):  melatonin 3 milliGRAM(s) Oral at bedtime  QUEtiapine 50 milliGRAM(s) Oral <User Schedule>    MEDICATIONS  (PRN):  acetaminophen     Tablet .. 650 milliGRAM(s) Oral every 6 hours PRN Temp greater or equal to 38C (100.4F), Mild Pain (1 - 3)  OLANZapine Injectable 1.25 milliGRAM(s) IntraMuscular every 6 hours PRN agitation       Vitals:  Vital Signs Last 24 Hrs  T(C): 36.4 (29 Jan 2024 11:50), Max: 36.8 (29 Jan 2024 05:14)  T(F): 97.5 (29 Jan 2024 11:50), Max: 98.3 (29 Jan 2024 05:14)  HR: 70 (29 Jan 2024 11:50) (70 - 88)  BP: 139/85 (29 Jan 2024 11:50) (112/70 - 139/85)  BP(mean): --  RR: 18 (29 Jan 2024 11:50) (18 - 19)  SpO2: 100% (29 Jan 2024 11:50) (99% - 100%)    Parameters below as of 29 Jan 2024 11:50  Patient On (Oxygen Delivery Method): room air            General Exam:   General Appearance: Appropriately dressed and in no acute distress       Head: Normocephalic, atraumatic and no dysmorphic features  Ear, Nose, and Throat: Moist mucous membranes  CVS: S1S2+  Resp: No SOB, no wheeze or rhonchi  Abd: soft NTND  Extremities: No edema, no cyanosis  Skin: No bruises, no rashes     Neurological Exam:  Mental Status: Awake, alert and oriented x 1-2.  Able to follow simple commands. Able to name and repeat. fluent speech. No obvious aphasia or dysarthria noted.   Cranial Nerves: PERRL, EOMI, VFFC, sensation V1-V3 intact,  no obvious facial asymmetry, equal elevation of palate, scm/trap 5/5, tongue is midline on protrusion. hearing is grossly intact.   Motor: Normal bulk, tone and strength throughout. Fine finger movements were intact and symmetric. no tremors or drift noted.    Sensation: Intact to light touch and pinprick throughout. no right/left confusion. no extinction to tactile on DSS.   Reflexes: 1+ throughout at biceps, brachioradialis, triceps, patellars and ankles bilaterally and equal. No clonus. R toe and L toe were both downgoing.  Coordination: No dysmetria on FNF  Gait: Narrow based and steady.       I personally reviewed the below data/images/labs:            < from: CT Head No Cont (01.02.24 @ 17:45) >    ACC: [id number] EXAM:  CT BRAIN   ORDERED BY: MARANDA HAM     PROCEDURE DATE:  01/02/2024          INTERPRETATION:  .    CLINICAL INFORMATION: Confusion. Altered mental status. Dementia.    TECHNIQUE: Multiple axial CT images of the head were obtained without   contrast. Sagittal and coronal reconstructed images were acquired from   the source data.    COMPARISON: No prior CT studies of the brain are available for comparison   at this institution.    FINDINGS: There is no acute intracranial hemorrhage, mass effect, shift   of the midline structures, herniation, extra-axial fluid collection, or   hydrocephalus.    There is diffuse cerebral volume loss with prominence of the sulci,   fissures, and cisternal spaces which is normal for the patient's age.   Moderate ventriculomegaly is seen. 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    The paranasal sinuses and tympanomastoid cavities are clear. The   calvarium is intact. There is evidence of left-sided cataract removal.    IMPRESSION: No acute intracranial hemorrhage, mass effect, or shift of   the midline structures.    < end of copied text >

## 2024-01-30 RX ADMIN — QUETIAPINE FUMARATE 50 MILLIGRAM(S): 200 TABLET, FILM COATED ORAL at 20:29

## 2024-01-30 RX ADMIN — OLANZAPINE 1.25 MILLIGRAM(S): 15 TABLET, FILM COATED ORAL at 12:47

## 2024-01-30 RX ADMIN — Medication 3 MILLIGRAM(S): at 20:29

## 2024-01-30 NOTE — CHART NOTE - NSCHARTNOTEFT_GEN_A_CORE
NUTRITION FOLLOW-UP:    82 y/o F with pmh of possible dementia p/w confusion. and found to have UTI.    Pt f/u per protocol, consuming 75% intake of meals and Hormel shakes twice per day. No GI distress (nausea/vomiting/diarrhea/constipation.) at present. Noted skin ecchymosis, no edema per nursing flow sheet. Pt within UBW range in past one month.     Weight: 112.8# (1/24/24), 115# (1/17/24), 113.3# (1/1/24)    Labs:  n/a    Current Diet: Diet, Regular (12-29-23 @ 22:08) [Active]    Skin- ecchymosis, no edema as per RN flow sheet    MEDICATIONS  (STANDING):  melatonin 3 milliGRAM(s) Oral at bedtime  QUEtiapine 50 milliGRAM(s) Oral <User Schedule>    Estimated needs: NO changes since previous assessment    Nutrition Diagnosis:  Ongoing    RD to Remain Available:    Additional Recommendations:   Continue with current diet and shakes as ordered.  Monitor PO intake, weight trends, nutrition related lab values, BMs/GI distress, hydration status, skin integrity.     Kae Browne RDN #[id number]

## 2024-01-30 NOTE — PROGRESS NOTE ADULT - ASSESSMENT
84 y/o F with pmh of possible dementia p/w confusion, found wandering in the grocery store confused and brought to ED for eval. Found to have UTI, on Ceftriaxone  Currently confused, does not know where she is, witnessed roaming hallways trying to get into others rooms.   Labs wnl, slight leukocytosis that has since normalized  CTH with age related volume loss, no acute pathology \  B12 541. Folate 17.6, RPR neg    Encephalopathy likely due to UTI superimposed on dementia    Plan:  - CTh reviewed  - check  TSH  - s/p Abx for UTI  - delirium precautions:   [] please have adequate sleeping environment for the patient, light on, curtains open, TV on during the day with regular stimulation and out of bed to chair if possible. During the night, close curtains, TV off, lights off, and minimize interruptions (limit blood draws and vital sign checks if possible). Regular interaction with patient with staff (introducing selves), frequent reorientation, and encouragement of visitors as allowed by hospital and unit policy. Regular toileting.  [] limitation of sedating medications as tolerated  [] maintain electrolytes within normal limits   - appreciate psych recs  - PT/OT  - DVT ppx  dc planning

## 2024-01-30 NOTE — PROGRESS NOTE ADULT - SUBJECTIVE AND OBJECTIVE BOX
Neurology Progress Note    S: Patient seen and examined. No new events overnight.       Medications: MEDICATIONS  (STANDING):  melatonin 3 milliGRAM(s) Oral at bedtime  QUEtiapine 50 milliGRAM(s) Oral <User Schedule>    MEDICATIONS  (PRN):  acetaminophen     Tablet .. 650 milliGRAM(s) Oral every 6 hours PRN Temp greater or equal to 38C (100.4F), Mild Pain (1 - 3)  OLANZapine Injectable 1.25 milliGRAM(s) IntraMuscular every 6 hours PRN agitation       Vitals:  Vital Signs Last 24 Hrs  T(C): 36.8 (30 Jan 2024 19:30), Max: 36.8 (30 Jan 2024 11:03)  T(F): 98.3 (30 Jan 2024 19:30), Max: 98.3 (30 Jan 2024 19:30)  HR: 78 (30 Jan 2024 19:30) (63 - 78)  BP: 117/48 (30 Jan 2024 19:30) (105/62 - 117/48)  BP(mean): --  RR: 18 (30 Jan 2024 19:30) (18 - 18)  SpO2: 100% (30 Jan 2024 19:30) (99% - 100%)    Parameters below as of 30 Jan 2024 19:30  Patient On (Oxygen Delivery Method): room air          General Exam:   General Appearance: Appropriately dressed and in no acute distress       Head: Normocephalic, atraumatic and no dysmorphic features  Ear, Nose, and Throat: Moist mucous membranes  CVS: S1S2+  Resp: No SOB, no wheeze or rhonchi  Abd: soft NTND  Extremities: No edema, no cyanosis  Skin: No bruises, no rashes     Neurological Exam:  Mental Status: Awake, alert and oriented x 1-2.  Able to follow simple commands. Able to name and repeat. fluent speech. No obvious aphasia or dysarthria noted.   Cranial Nerves: PERRL, EOMI, VFFC, sensation V1-V3 intact,  no obvious facial asymmetry, equal elevation of palate, scm/trap 5/5, tongue is midline on protrusion. hearing is grossly intact.   Motor: Normal bulk, tone and strength throughout. Fine finger movements were intact and symmetric. no tremors or drift noted.    Sensation: Intact to light touch and pinprick throughout. no right/left confusion. no extinction to tactile on DSS.   Reflexes: 1+ throughout at biceps, brachioradialis, triceps, patellars and ankles bilaterally and equal. No clonus. R toe and L toe were both downgoing.  Coordination: No dysmetria on FNF  Gait: Narrow based and steady.       I personally reviewed the below data/images/labs:            < from: CT Head No Cont (01.02.24 @ 17:45) >    ACC: [id number] EXAM:  CT BRAIN   ORDERED BY: MARANDA HAM     PROCEDURE DATE:  01/02/2024          INTERPRETATION:  .    CLINICAL INFORMATION: Confusion. Altered mental status. Dementia.    TECHNIQUE: Multiple axial CT images of the head were obtained without   contrast. Sagittal and coronal reconstructed images were acquired from   the source data.    COMPARISON: No prior CT studies of the brain are available for comparison   at this institution.    FINDINGS: There is no acute intracranial hemorrhage, mass effect, shift   of the midline structures, herniation, extra-axial fluid collection, or   hydrocephalus.    There is diffuse cerebral volume loss with prominence of the sulci,   fissures, and cisternal spaces which is normal for the patient's age.   Moderate ventriculomegaly is seen. 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    The paranasal sinuses and tympanomastoid cavities are clear. The   calvarium is intact. There is evidence of left-sided cataract removal.    IMPRESSION: No acute intracranial hemorrhage, mass effect, or shift of   the midline structures.    < end of copied text >

## 2024-01-30 NOTE — PROGRESS NOTE ADULT - SUBJECTIVE AND OBJECTIVE BOX
Patient is a 83y old  Female who presents with a chief complaint of confusion   1/30/24      SUBJECTIVE / OVERNIGHT EVENTS:pt seen and examined    MEDICATIONS  (STANDING):  melatonin 3 milliGRAM(s) Oral at bedtime  QUEtiapine 50 milliGRAM(s) Oral <User Schedule>    MEDICATIONS  (PRN):  acetaminophen     Tablet .. 650 milliGRAM(s) Oral every 6 hours PRN Temp greater or equal to 38C (100.4F), Mild Pain (1 - 3)  OLANZapine Injectable 1.25 milliGRAM(s) IntraMuscular every 6 hours PRN agitation    Vital Signs Last 24 Hrs  T(C): 36.8 (01-30-24 @ 19:30), Max: 36.8 (01-30-24 @ 11:03)  T(F): 98.3 (01-30-24 @ 19:30), Max: 98.3 (01-30-24 @ 19:30)  HR: 78 (01-30-24 @ 19:30) (63 - 78)  BP: 116/56 (01-30-24 @ 20:29) (105/62 - 117/48)  BP(mean): --  RR: 18 (01-30-24 @ 19:30) (18 - 18)  SpO2: 100% (01-30-24 @ 19:30) (99% - 100%)      PHYSICAL EXAM:  GENERAL: NAD  NECK: Supple, No JVD  CHEST/LUNG: dec breath sounds at bases  HEART: Regular rate and rhythm; No murmurs, rubs, or gallops  ABDOMEN: Soft, Nontender, Nondistended; Bowel sounds present  EXTREMITIES:   No edema  NEUROLOGY: Alert awake confused  ambulating on floor without difficulty     LABS:        Creatinine Trend:     Urine Studies:                                                                                                                                                                                                                                          RADIOLOGY & ADDITIONAL TESTS:    Imaging Personally Reviewed:yes    Consultant(s) Notes Reviewed:  yes    Care Discussed with Consultants/Other Providers:yes

## 2024-01-31 PROCEDURE — 99232 SBSQ HOSP IP/OBS MODERATE 35: CPT

## 2024-01-31 RX ORDER — QUETIAPINE FUMARATE 200 MG/1
12.5 TABLET, FILM COATED ORAL DAILY
Refills: 0 | Status: DISCONTINUED | OUTPATIENT
Start: 2024-01-31 | End: 2024-01-31

## 2024-01-31 RX ORDER — QUETIAPINE FUMARATE 200 MG/1
12.5 TABLET, FILM COATED ORAL
Refills: 0 | Status: DISCONTINUED | OUTPATIENT
Start: 2024-01-31 | End: 2024-02-05

## 2024-01-31 RX ADMIN — Medication 3 MILLIGRAM(S): at 20:13

## 2024-01-31 RX ADMIN — QUETIAPINE FUMARATE 50 MILLIGRAM(S): 200 TABLET, FILM COATED ORAL at 20:13

## 2024-01-31 NOTE — BH CONSULTATION LIAISON PROGRESS NOTE - OTHER
smiling frequently during an interview concrete/goal directed able to name pen concrete/goal directed  impoverished poverty of content

## 2024-01-31 NOTE — BH CONSULTATION LIAISON PROGRESS NOTE - NSBHFUPINTERVALHXFT_PSY_A_CORE
Chart reviewed. No overnight events. received Olanzapine 1.25 mg PO PRN for agitation. She is alert and orientedX1. confused but has no complaints reports sleeping and eating well. Denies AVh, denies si and hi Chart reviewed. No overnight events. received Olanzapine 1.25 mg IM PRN for agitation. She is alert and orientedX1. confused but has no complaints reports sleeping and eating well. Denies AVh, denies si and hi

## 2024-01-31 NOTE — BH CONSULTATION LIAISON PROGRESS NOTE - NSBHASSESSMENTFT_PSY_ALL_CORE
82 y/o F with PMH of possible dementia p/w confusion.  Pt was found by bystander (who is a nurse) wandering by a local grocery store and appeared confused.  Bystander brought pt to her home and noted pt's home was in a poor state, and did not seem safe for pt to live alone.  Pt brought to ED by EMS.     On evaluation patient is alert, oriented X1, calm but confused, needed a lot of redirection but was not agitated. in the ED patient found to have UTI and antibiotic was started. with no know past psychiatric history and her history of infection it is most likely to be delirium with underlying dementia. currently patient is calm, did not need any PRNs for the last 2 days.     1/5: Patient orientedX1, confused, not sleeping and getting agitated at night where she requires PRNs.   1/8: Patient is oriented x1. better sleep and no PRNS. Plan to continue Seroquel 25 mg at 8 pm.   1/24: Patient is confused, received Olanzapine PRN PO X1 for agitation in the last 24 hours. Can increase Seroquel to 50 mg at 8 pm.   1/31: Patient is confused, intermittently agitated receiving PRNs, Patient often get agitated due to confusion in the morning and would benefit from a small dose of Seroquel in the morning as it has been working well.     Plan:   - Routine observation   - Medical management per primary team   - []START Seroquel 12.5 mg in the AM    - []CONTINUE Seroquel 50 mg PO at 8 pm if qtc <500.   - Does not have capacity to participate in safe discharge planning   - For agitation can use Olanzapine 1.25 mg PO/IM PRN Q6hr. Monitor QTC    Dispo: No role for inpatient psychiatry admission but patient might benefit from higher level of dispo like a nursing home. 82 y/o F with PMH of possible dementia p/w confusion.  Pt was found by bystander (who is a nurse) wandering by a local grocery store and appeared confused.  Bystander brought pt to her home and noted pt's home was in a poor state, and did not seem safe for pt to live alone.  Pt brought to ED by EMS.     On evaluation patient is alert, oriented X1, calm but confused, needed a lot of redirection but was not agitated. in the ED patient found to have UTI and antibiotic was started. with no know past psychiatric history and her history of infection it is most likely to be delirium with underlying dementia. currently patient is calm, did not need any PRNs for the last 2 days.     1/5: Patient orientedX1, confused, not sleeping and getting agitated at night where she requires PRNs.   1/8: Patient is oriented x1. better sleep and no PRNS. Plan to continue Seroquel 25 mg at 8 pm.   1/24: Patient is confused, received Olanzapine PRN PO X1 for agitation in the last 24 hours. Can increase Seroquel to 50 mg at 8 pm.   1/31: Patient is confused, intermittently agitated receiving PRNs, Patient often get agitated due to confusion in the morning and would benefit from a small dose of Seroquel in the morning as it has been working well.     Plan:   - Routine observation   - Medical management per primary team   - []START Seroquel 12.5 mg in the AM    - []CONTINUE Seroquel 50 mg PO at 8 pm if qtc <500.   - Does not have capacity to participate in safe discharge planning   - For agitation can use Olanzapine 1.25 mg PO/IM PRN Q6hr.   - Hold antipsychotics if qtc >500    Dispo: No role for inpatient psychiatry admission but patient might benefit from higher level of dispo like a nursing home.

## 2024-01-31 NOTE — PROGRESS NOTE ADULT - SUBJECTIVE AND OBJECTIVE BOX
Neurology Progress Note    S: Patient seen and examined. Got zyprexa overnight      Medications: MEDICATIONS  (STANDING):  melatonin 3 milliGRAM(s) Oral at bedtime  QUEtiapine 12.5 milliGRAM(s) Oral <User Schedule>  QUEtiapine 50 milliGRAM(s) Oral <User Schedule>    MEDICATIONS  (PRN):  acetaminophen     Tablet .. 650 milliGRAM(s) Oral every 6 hours PRN Temp greater or equal to 38C (100.4F), Mild Pain (1 - 3)  OLANZapine Injectable 1.25 milliGRAM(s) IntraMuscular every 6 hours PRN agitation       Vitals:  Vital Signs Last 24 Hrs  T(C): 36.7 (31 Jan 2024 11:25), Max: 36.8 (30 Jan 2024 19:30)  T(F): 98.1 (31 Jan 2024 11:25), Max: 98.3 (30 Jan 2024 19:30)  HR: 60 (31 Jan 2024 11:25) (60 - 100)  BP: 104/58 (31 Jan 2024 11:25) (96/77 - 117/48)  BP(mean): --  RR: 18 (31 Jan 2024 11:25) (18 - 18)  SpO2: 99% (31 Jan 2024 11:25) (99% - 100%)    Parameters below as of 31 Jan 2024 11:25  Patient On (Oxygen Delivery Method): room air          General Exam:   General Appearance: Appropriately dressed and in no acute distress       Head: Normocephalic, atraumatic and no dysmorphic features  Ear, Nose, and Throat: Moist mucous membranes  CVS: S1S2+  Resp: No SOB, no wheeze or rhonchi  Abd: soft NTND  Extremities: No edema, no cyanosis  Skin: No bruises, no rashes     Neurological Exam:  Mental Status: Awake, alert and oriented x 1-2.  Able to follow simple commands. Able to name and repeat. fluent speech. No obvious aphasia or dysarthria noted.   Cranial Nerves: PERRL, EOMI, VFFC, sensation V1-V3 intact,  no obvious facial asymmetry, equal elevation of palate, scm/trap 5/5, tongue is midline on protrusion. hearing is grossly intact.   Motor: Normal bulk, tone and strength throughout. Fine finger movements were intact and symmetric. no tremors or drift noted.    Sensation: Intact to light touch and pinprick throughout. no right/left confusion. no extinction to tactile on DSS.   Reflexes: 1+ throughout at biceps, brachioradialis, triceps, patellars and ankles bilaterally and equal. No clonus. R toe and L toe were both downgoing.  Coordination: No dysmetria on FNF  Gait: Narrow based and steady.       I personally reviewed the below data/images/labs:        LABS:      no new labs      < from: CT Head No Cont (01.02.24 @ 17:45) >    ACC: [id number] EXAM:  CT BRAIN   ORDERED BY: MARANDA HAM     PROCEDURE DATE:  01/02/2024          INTERPRETATION:  .    CLINICAL INFORMATION: Confusion. Altered mental status. Dementia.    TECHNIQUE: Multiple axial CT images of the head were obtained without   contrast. Sagittal and coronal reconstructed images were acquired from   the source data.    COMPARISON: No prior CT studies of the brain are available for comparison   at this institution.    FINDINGS: There is no acute intracranial hemorrhage, mass effect, shift   of the midline structures, herniation, extra-axial fluid collection, or   hydrocephalus.    There is diffuse cerebral volume loss with prominence of the sulci,   fissures, and cisternal spaces which is normal for the patient's age.   Moderate ventriculomegaly is seen. 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    The paranasal sinuses and tympanomastoid cavities are clear. The   calvarium is intact. There is evidence of left-sided cataract removal.    IMPRESSION: No acute intracranial hemorrhage, mass effect, or shift of   the midline structures.    < end of copied text >

## 2024-01-31 NOTE — PROGRESS NOTE ADULT - ASSESSMENT
84 y/o F with pmh of possible dementia p/w confusion, found wandering in the grocery store confused and brought to ED for eval. Found to have UTI, on Ceftriaxone  Currently confused, does not know where she is, witnessed roaming hallways trying to get into others rooms.   Labs wnl, slight leukocytosis that has since normalized  CTH with age related volume loss, no acute pathology \  B12 541. Folate 17.6, RPR neg    Encephalopathy likely due to UTI superimposed on dementia    Plan:  - now on seroquel per psych 12.5/50 - monitor qtc  - CTh reviewed  - check  TSH  - s/p Abx for UTI  - delirium precautions:   [] please have adequate sleeping environment for the patient, light on, curtains open, TV on during the day with regular stimulation and out of bed to chair if possible. During the night, close curtains, TV off, lights off, and minimize interruptions (limit blood draws and vital sign checks if possible). Regular interaction with patient with staff (introducing selves), frequent reorientation, and encouragement of visitors as allowed by hospital and unit policy. Regular toileting.  [] limitation of sedating medications as tolerated  [] maintain electrolytes within normal limits   - appreciate psych recs  - PT/OT  - DVT ppx  dc planning

## 2024-01-31 NOTE — BH CONSULTATION LIAISON PROGRESS NOTE - NSBHCONSULTFOLLOWAFTERCARE_PSY_A_CORE FT
No role for inpatient psychiatry admission but warrants a safe discharge and patient might benefit from higher level of dispo like a nursing home. No role for inpatient psychiatry admission but warrants a safe discharge and patient might benefit from higher level of dispo like a nursing home.  Follow up with the psychiatrist at the facility

## 2024-01-31 NOTE — PROGRESS NOTE ADULT - SUBJECTIVE AND OBJECTIVE BOX
Patient is a 83y old  Female who presents with a chief complaint of confusion   1/31/24      SUBJECTIVE / OVERNIGHT EVENTS:pt seen and examined    MEDICATIONS  (STANDING):  melatonin 3 milliGRAM(s) Oral at bedtime  QUEtiapine 12.5 milliGRAM(s) Oral <User Schedule>  QUEtiapine 50 milliGRAM(s) Oral <User Schedule>    MEDICATIONS  (PRN):  acetaminophen     Tablet .. 650 milliGRAM(s) Oral every 6 hours PRN Temp greater or equal to 38C (100.4F), Mild Pain (1 - 3)  OLANZapine Injectable 1.25 milliGRAM(s) IntraMuscular every 6 hours PRN agitation    Vital Signs Last 24 Hrs  T(C): 36.8 (01-31-24 @ 19:38), Max: 36.8 (01-31-24 @ 19:38)  T(F): 98.2 (01-31-24 @ 19:38), Max: 98.2 (01-31-24 @ 19:38)  HR: 73 (01-31-24 @ 19:38) (60 - 100)  BP: 118/52 (01-31-24 @ 19:38) (96/77 - 118/52)  BP(mean): --  RR: 18 (01-31-24 @ 19:38) (18 - 18)  SpO2: 99% (01-31-24 @ 19:38) (99% - 99%)        PHYSICAL EXAM:  GENERAL: NAD  NECK: Supple, No JVD  CHEST/LUNG: dec breath sounds at bases  HEART: Regular rate and rhythm; No murmurs, rubs, or gallops  ABDOMEN: Soft, Nontender, Nondistended; Bowel sounds present  EXTREMITIES:   No edema  NEUROLOGY: Alert awake confused  ambulating on floor without difficulty     LABS:        Creatinine Trend:     Urine Studies:                                                                                                                                                                                                                                          RADIOLOGY & ADDITIONAL TESTS:    Imaging Personally Reviewed:yes    Consultant(s) Notes Reviewed:  yes    Care Discussed with Consultants/Other Providers:yes

## 2024-02-01 RX ADMIN — OLANZAPINE 1.25 MILLIGRAM(S): 15 TABLET, FILM COATED ORAL at 00:30

## 2024-02-01 RX ADMIN — QUETIAPINE FUMARATE 12.5 MILLIGRAM(S): 200 TABLET, FILM COATED ORAL at 05:49

## 2024-02-01 RX ADMIN — Medication 3 MILLIGRAM(S): at 20:26

## 2024-02-01 RX ADMIN — QUETIAPINE FUMARATE 50 MILLIGRAM(S): 200 TABLET, FILM COATED ORAL at 19:18

## 2024-02-01 RX ADMIN — OLANZAPINE 1.25 MILLIGRAM(S): 15 TABLET, FILM COATED ORAL at 21:38

## 2024-02-01 NOTE — PROGRESS NOTE ADULT - ASSESSMENT
82 y/o F with pmh of possible dementia p/w confusion, found wandering in the grocery store confused and brought to ED for eval. Found to have UTI, on Ceftriaxone  Currently confused, does not know where she is, witnessed roaming hallways trying to get into others rooms.   Labs wnl, slight leukocytosis that has since normalized  CTH with age related volume loss, no acute pathology \  B12 541. Folate 17.6, RPR neg    Encephalopathy likely due to UTI superimposed on dementia    Plan:  - now on seroquel per psych 12.5/50 - monitor qtc  - CTh reviewed  - check  TSH  - s/p Abx for UTI  - delirium precautions:   [] please have adequate sleeping environment for the patient, light on, curtains open, TV on during the day with regular stimulation and out of bed to chair if possible. During the night, close curtains, TV off, lights off, and minimize interruptions (limit blood draws and vital sign checks if possible). Regular interaction with patient with staff (introducing selves), frequent reorientation, and encouragement of visitors as allowed by hospital and unit policy. Regular toileting.  [] limitation of sedating medications as tolerated  [] maintain electrolytes within normal limits   - appreciate psych recs  - PT/OT  - DVT ppx  dc planning

## 2024-02-01 NOTE — PROGRESS NOTE ADULT - SUBJECTIVE AND OBJECTIVE BOX
Patient is a 83y old  Female who presents with a chief complaint of confusion   2/1/24      SUBJECTIVE / OVERNIGHT EVENTS:pt seen and examined    MEDICATIONS  (STANDING):  melatonin 3 milliGRAM(s) Oral at bedtime  QUEtiapine 50 milliGRAM(s) Oral <User Schedule>  QUEtiapine 12.5 milliGRAM(s) Oral <User Schedule>    MEDICATIONS  (PRN):  acetaminophen     Tablet .. 650 milliGRAM(s) Oral every 6 hours PRN Temp greater or equal to 38C (100.4F), Mild Pain (1 - 3)  OLANZapine Injectable 1.25 milliGRAM(s) IntraMuscular every 6 hours PRN agitation    Vital Signs Last 24 Hrs  T(C): 36.4 (02-01-24 @ 22:10), Max: 37.6 (02-01-24 @ 20:14)  T(F): 97.5 (02-01-24 @ 22:10), Max: 99.7 (02-01-24 @ 20:14)  HR: 72 (02-01-24 @ 22:10) (60 - 80)  BP: 105/89 (02-01-24 @ 22:10) (98/60 - 121/71)  BP(mean): --  RR: 18 (02-01-24 @ 22:10) (16 - 18)  SpO2: 100% (02-01-24 @ 22:10) (99% - 100%)        PHYSICAL EXAM:  GENERAL: NAD  NECK: Supple, No JVD  CHEST/LUNG: dec breath sounds at bases  HEART: Regular rate and rhythm; No murmurs, rubs, or gallops  ABDOMEN: Soft, Nontender, Nondistended; Bowel sounds present  EXTREMITIES:   No edema  NEUROLOGY: Alert awake confused  ambulating on floor without difficulty     LABS:        Creatinine Trend:     Urine Studies:                                                                                                                                                                                                                                                RADIOLOGY & ADDITIONAL TESTS:    Imaging Personally Reviewed:yes    Consultant(s) Notes Reviewed:  yes    Care Discussed with Consultants/Other Providers:yes

## 2024-02-01 NOTE — PROGRESS NOTE ADULT - SUBJECTIVE AND OBJECTIVE BOX
Neurology Progress Note    S: Patient seen and examined.       Medications: MEDICATIONS  (STANDING):  melatonin 3 milliGRAM(s) Oral at bedtime  QUEtiapine 12.5 milliGRAM(s) Oral <User Schedule>  QUEtiapine 50 milliGRAM(s) Oral <User Schedule>    MEDICATIONS  (PRN):  acetaminophen     Tablet .. 650 milliGRAM(s) Oral every 6 hours PRN Temp greater or equal to 38C (100.4F), Mild Pain (1 - 3)  OLANZapine Injectable 1.25 milliGRAM(s) IntraMuscular every 6 hours PRN agitation       Vitals:  Vital Signs Last 24 Hrs  T(C): 36.7 (01 Feb 2024 11:10), Max: 36.8 (31 Jan 2024 19:38)  T(F): 98.1 (01 Feb 2024 11:10), Max: 98.2 (31 Jan 2024 19:38)  HR: 60 (01 Feb 2024 11:10) (60 - 78)  BP: 98/60 (01 Feb 2024 11:10) (98/60 - 118/52)  BP(mean): --  RR: 18 (01 Feb 2024 11:10) (18 - 18)  SpO2: 100% (01 Feb 2024 11:10) (99% - 100%)    Parameters below as of 01 Feb 2024 11:10  Patient On (Oxygen Delivery Method): room air              General Exam:   General Appearance: Appropriately dressed and in no acute distress       Head: Normocephalic, atraumatic and no dysmorphic features  Ear, Nose, and Throat: Moist mucous membranes  CVS: S1S2+  Resp: No SOB, no wheeze or rhonchi  Abd: soft NTND  Extremities: No edema, no cyanosis  Skin: No bruises, no rashes     Neurological Exam:  Mental Status: Awake, alert and oriented x 1-2.  Able to follow simple commands. Able to name and repeat. fluent speech. No obvious aphasia or dysarthria noted.   Cranial Nerves: PERRL, EOMI, VFFC, sensation V1-V3 intact,  no obvious facial asymmetry, equal elevation of palate, scm/trap 5/5, tongue is midline on protrusion. hearing is grossly intact.   Motor: Normal bulk, tone and strength throughout. Fine finger movements were intact and symmetric. no tremors or drift noted.    Sensation: Intact to light touch and pinprick throughout. no right/left confusion. no extinction to tactile on DSS.   Reflexes: 1+ throughout at biceps, brachioradialis, triceps, patellars and ankles bilaterally and equal. No clonus. R toe and L toe were both downgoing.  Coordination: No dysmetria on FNF  Gait: Narrow based and steady.       I personally reviewed the below data/images/labs:        LABS:      no new labs      < from: CT Head No Cont (01.02.24 @ 17:45) >    ACC: [id number] EXAM:  CT BRAIN   ORDERED BY: MARANDA HAM     PROCEDURE DATE:  01/02/2024          INTERPRETATION:  .    CLINICAL INFORMATION: Confusion. Altered mental status. Dementia.    TECHNIQUE: Multiple axial CT images of the head were obtained without   contrast. Sagittal and coronal reconstructed images were acquired from   the source data.    COMPARISON: No prior CT studies of the brain are available for comparison   at this institution.    FINDINGS: There is no acute intracranial hemorrhage, mass effect, shift   of the midline structures, herniation, extra-axial fluid collection, or   hydrocephalus.    There is diffuse cerebral volume loss with prominence of the sulci,   fissures, and cisternal spaces which is normal for the patient's age.   Moderate ventriculomegaly is seen. 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    The paranasal sinuses and tympanomastoid cavities are clear. The   calvarium is intact. There is evidence of left-sided cataract removal.    IMPRESSION: No acute intracranial hemorrhage, mass effect, or shift of   the midline structures.    < end of copied text >

## 2024-02-02 RX ADMIN — OLANZAPINE 1.25 MILLIGRAM(S): 15 TABLET, FILM COATED ORAL at 19:21

## 2024-02-02 RX ADMIN — Medication 3 MILLIGRAM(S): at 21:31

## 2024-02-02 RX ADMIN — QUETIAPINE FUMARATE 50 MILLIGRAM(S): 200 TABLET, FILM COATED ORAL at 18:10

## 2024-02-02 RX ADMIN — QUETIAPINE FUMARATE 12.5 MILLIGRAM(S): 200 TABLET, FILM COATED ORAL at 05:16

## 2024-02-02 RX ADMIN — Medication 650 MILLIGRAM(S): at 15:13

## 2024-02-02 RX ADMIN — Medication 650 MILLIGRAM(S): at 14:11

## 2024-02-02 NOTE — PROGRESS NOTE ADULT - SUBJECTIVE AND OBJECTIVE BOX
Neurology Progress Note    S: Patient seen and examined.     Medications: MEDICATIONS  (STANDING):  melatonin 3 milliGRAM(s) Oral at bedtime  QUEtiapine 50 milliGRAM(s) Oral <User Schedule>  QUEtiapine 12.5 milliGRAM(s) Oral <User Schedule>    MEDICATIONS  (PRN):  acetaminophen     Tablet .. 650 milliGRAM(s) Oral every 6 hours PRN Temp greater or equal to 38C (100.4F), Mild Pain (1 - 3)  OLANZapine Injectable 1.25 milliGRAM(s) IntraMuscular every 6 hours PRN agitation       Vitals:  Vital Signs Last 24 Hrs  T(C): 36.6 (02 Feb 2024 11:20), Max: 37.6 (01 Feb 2024 20:14)  T(F): 97.8 (02 Feb 2024 11:20), Max: 99.7 (01 Feb 2024 20:14)  HR: 85 (02 Feb 2024 11:20) (63 - 85)  BP: 125/87 (02 Feb 2024 11:20) (105/89 - 125/87)  BP(mean): --  RR: 16 (02 Feb 2024 11:20) (16 - 18)  SpO2: 98% (02 Feb 2024 11:20) (98% - 100%)    Parameters below as of 02 Feb 2024 11:20  Patient On (Oxygen Delivery Method): room air              General Exam:   General Appearance: Appropriately dressed and in no acute distress       Head: Normocephalic, atraumatic and no dysmorphic features  Ear, Nose, and Throat: Moist mucous membranes  CVS: S1S2+  Resp: No SOB, no wheeze or rhonchi  Abd: soft NTND  Extremities: No edema, no cyanosis  Skin: No bruises, no rashes     Neurological Exam:  Mental Status: Awake, alert and oriented x 1-2.  Able to follow simple commands. Able to name and repeat. fluent speech. No obvious aphasia or dysarthria noted.   Cranial Nerves: PERRL, EOMI, VFFC, sensation V1-V3 intact,  no obvious facial asymmetry, equal elevation of palate, scm/trap 5/5, tongue is midline on protrusion. hearing is grossly intact.   Motor: Normal bulk, tone and strength throughout. Fine finger movements were intact and symmetric. no tremors or drift noted.    Sensation: Intact to light touch and pinprick throughout. no right/left confusion. no extinction to tactile on DSS.   Reflexes: 1+ throughout at biceps, brachioradialis, triceps, patellars and ankles bilaterally and equal. No clonus. R toe and L toe were both downgoing.  Coordination: No dysmetria on FNF  Gait: Narrow based and steady.       I personally reviewed the below data/images/labs:        LABS:      no new labs      < from: CT Head No Cont (01.02.24 @ 17:45) >    ACC: [id number] EXAM:  CT BRAIN   ORDERED BY: MARANDA HAM     PROCEDURE DATE:  01/02/2024          INTERPRETATION:  .    CLINICAL INFORMATION: Confusion. Altered mental status. Dementia.    TECHNIQUE: Multiple axial CT images of the head were obtained without   contrast. Sagittal and coronal reconstructed images were acquired from   the source data.    COMPARISON: No prior CT studies of the brain are available for comparison   at this institution.    FINDINGS: There is no acute intracranial hemorrhage, mass effect, shift   of the midline structures, herniation, extra-axial fluid collection, or   hydrocephalus.    There is diffuse cerebral volume loss with prominence of the sulci,   fissures, and cisternal spaces which is normal for the patient's age.   Moderate ventriculomegaly is seen. 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    The paranasal sinuses and tympanomastoid cavities are clear. The   calvarium is intact. There is evidence of left-sided cataract removal.    IMPRESSION: No acute intracranial hemorrhage, mass effect, or shift of   the midline structures.    < end of copied text >

## 2024-02-02 NOTE — PROGRESS NOTE ADULT - SUBJECTIVE AND OBJECTIVE BOX
Patient is a 83y old  Female who presents with a chief complaint of confusion   2/2/24      SUBJECTIVE / OVERNIGHT EVENTS:pt seen and examined    MEDICATIONS  (STANDING):  melatonin 3 milliGRAM(s) Oral at bedtime  QUEtiapine 50 milliGRAM(s) Oral <User Schedule>  QUEtiapine 12.5 milliGRAM(s) Oral <User Schedule>    MEDICATIONS  (PRN):  acetaminophen     Tablet .. 650 milliGRAM(s) Oral every 6 hours PRN Temp greater or equal to 38C (100.4F), Mild Pain (1 - 3)  OLANZapine Injectable 1.25 milliGRAM(s) IntraMuscular every 6 hours PRN agitation    Vital Signs Last 24 Hrs  T(C): 36.6 (02-02-24 @ 11:20), Max: 37.6 (02-01-24 @ 20:14)  T(F): 97.8 (02-02-24 @ 11:20), Max: 99.7 (02-01-24 @ 20:14)  HR: 85 (02-02-24 @ 11:20) (63 - 85)  BP: 125/87 (02-02-24 @ 11:20) (105/89 - 125/87)  BP(mean): --  RR: 16 (02-02-24 @ 11:20) (16 - 18)  SpO2: 98% (02-02-24 @ 11:20) (98% - 100%)        PHYSICAL EXAM:  GENERAL: NAD  NECK: Supple, No JVD  CHEST/LUNG: dec breath sounds at bases  HEART: Regular rate and rhythm; No murmurs, rubs, or gallops  ABDOMEN: Soft, Nontender, Nondistended; Bowel sounds present  EXTREMITIES:   No edema  NEUROLOGY: Alert awake confused  ambulating on floor without difficulty     LABS:        Creatinine Trend:     Urine Studies:                                                                                                                                                                                                                                                RADIOLOGY & ADDITIONAL TESTS:    Imaging Personally Reviewed:yes    Consultant(s) Notes Reviewed:  yes    Care Discussed with Consultants/Other Providers:yes

## 2024-02-03 RX ADMIN — Medication 3 MILLIGRAM(S): at 21:23

## 2024-02-03 RX ADMIN — QUETIAPINE FUMARATE 50 MILLIGRAM(S): 200 TABLET, FILM COATED ORAL at 21:23

## 2024-02-03 RX ADMIN — QUETIAPINE FUMARATE 12.5 MILLIGRAM(S): 200 TABLET, FILM COATED ORAL at 05:08

## 2024-02-03 NOTE — PROGRESS NOTE ADULT - SUBJECTIVE AND OBJECTIVE BOX
Patient is a 83y old  Female who presents with a chief complaint of confusion   2/3/24      SUBJECTIVE / OVERNIGHT EVENTS:pt seen and examined    MEDICATIONS  (STANDING):  melatonin 3 milliGRAM(s) Oral at bedtime  QUEtiapine 12.5 milliGRAM(s) Oral <User Schedule>  QUEtiapine 50 milliGRAM(s) Oral <User Schedule>    MEDICATIONS  (PRN):  acetaminophen     Tablet .. 650 milliGRAM(s) Oral every 6 hours PRN Temp greater or equal to 38C (100.4F), Mild Pain (1 - 3)  OLANZapine Injectable 1.25 milliGRAM(s) IntraMuscular every 6 hours PRN agitation    Vital Signs Last 24 Hrs  T(C): 36.3 (02-03-24 @ 20:09), Max: 36.9 (02-03-24 @ 05:13)  T(F): 97.4 (02-03-24 @ 20:09), Max: 98.5 (02-03-24 @ 05:13)  HR: 91 (02-03-24 @ 20:09) (60 - 95)  BP: 122/62 (02-03-24 @ 20:09) (111/58 - 122/62)  BP(mean): --  RR: 18 (02-03-24 @ 20:09) (17 - 18)  SpO2: 100% (02-03-24 @ 20:09) (100% - 100%)      PHYSICAL EXAM:  GENERAL: NAD  NECK: Supple, No JVD  CHEST/LUNG: dec breath sounds at bases  HEART: Regular rate and rhythm; No murmurs, rubs, or gallops  ABDOMEN: Soft, Nontender, Nondistended; Bowel sounds present  EXTREMITIES:   No edema  NEUROLOGY: Alert awake confused  ambulating on floor without difficulty     LABS:        Creatinine Trend:     Urine Studies:                                                                                                                                                                                                                                                RADIOLOGY & ADDITIONAL TESTS:    Imaging Personally Reviewed:yes    Consultant(s) Notes Reviewed:  yes    Care Discussed with Consultants/Other Providers:yes

## 2024-02-04 RX ADMIN — OLANZAPINE 1.25 MILLIGRAM(S): 15 TABLET, FILM COATED ORAL at 15:30

## 2024-02-04 RX ADMIN — OLANZAPINE 1.25 MILLIGRAM(S): 15 TABLET, FILM COATED ORAL at 21:30

## 2024-02-04 RX ADMIN — QUETIAPINE FUMARATE 50 MILLIGRAM(S): 200 TABLET, FILM COATED ORAL at 18:02

## 2024-02-04 RX ADMIN — Medication 3 MILLIGRAM(S): at 21:21

## 2024-02-04 RX ADMIN — QUETIAPINE FUMARATE 12.5 MILLIGRAM(S): 200 TABLET, FILM COATED ORAL at 05:42

## 2024-02-04 NOTE — PROGRESS NOTE ADULT - SUBJECTIVE AND OBJECTIVE BOX
Patient is a 83y old  Female who presents with a chief complaint of confusion   2/4/24      SUBJECTIVE / OVERNIGHT EVENTS:pt seen and examined    MEDICATIONS  (STANDING):  melatonin 3 milliGRAM(s) Oral at bedtime  QUEtiapine 50 milliGRAM(s) Oral <User Schedule>  QUEtiapine 12.5 milliGRAM(s) Oral <User Schedule>    MEDICATIONS  (PRN):  acetaminophen     Tablet .. 650 milliGRAM(s) Oral every 6 hours PRN Temp greater or equal to 38C (100.4F), Mild Pain (1 - 3)  OLANZapine Injectable 1.25 milliGRAM(s) IntraMuscular every 6 hours PRN agitation    Vital Signs Last 24 Hrs  T(C): 36.3 (02-04-24 @ 04:45), Max: 36.3 (02-03-24 @ 20:09)  T(F): 97.4 (02-04-24 @ 04:45), Max: 97.4 (02-03-24 @ 20:09)  HR: 66 (02-04-24 @ 04:45) (66 - 91)  BP: 119/64 (02-04-24 @ 04:45) (119/64 - 122/62)  BP(mean): --  RR: 17 (02-04-24 @ 04:45) (17 - 18)  SpO2: 97% (02-04-24 @ 04:45) (97% - 100%)        PHYSICAL EXAM:  GENERAL: NAD  NECK: Supple, No JVD  CHEST/LUNG: dec breath sounds at bases  HEART: Regular rate and rhythm; No murmurs, rubs, or gallops  ABDOMEN: Soft, Nontender, Nondistended; Bowel sounds present  EXTREMITIES:   No edema  NEUROLOGY: Alert awake confused  ambulating on floor without difficulty     LABS:        Creatinine Trend:     Urine Studies:                                                                                                                                                                                                                                                  RADIOLOGY & ADDITIONAL TESTS:    Imaging Personally Reviewed:yes    Consultant(s) Notes Reviewed:  yes    Care Discussed with Consultants/Other Providers:yes

## 2024-02-05 RX ORDER — QUETIAPINE FUMARATE 200 MG/1
12.5 TABLET, FILM COATED ORAL
Refills: 0 | Status: DISCONTINUED | OUTPATIENT
Start: 2024-02-05 | End: 2024-02-16

## 2024-02-05 RX ORDER — QUETIAPINE FUMARATE 200 MG/1
25 TABLET, FILM COATED ORAL
Refills: 0 | Status: DISCONTINUED | OUTPATIENT
Start: 2024-02-05 | End: 2024-03-07

## 2024-02-05 RX ADMIN — QUETIAPINE FUMARATE 12.5 MILLIGRAM(S): 200 TABLET, FILM COATED ORAL at 13:02

## 2024-02-05 RX ADMIN — Medication 3 MILLIGRAM(S): at 21:02

## 2024-02-05 RX ADMIN — QUETIAPINE FUMARATE 50 MILLIGRAM(S): 200 TABLET, FILM COATED ORAL at 17:16

## 2024-02-05 RX ADMIN — QUETIAPINE FUMARATE 12.5 MILLIGRAM(S): 200 TABLET, FILM COATED ORAL at 05:44

## 2024-02-05 NOTE — PROGRESS NOTE ADULT - SUBJECTIVE AND OBJECTIVE BOX
Neurology Progress Note    S: Patient seen and examined.       Medications: MEDICATIONS  (STANDING):  melatonin 3 milliGRAM(s) Oral at bedtime  QUEtiapine 25 milliGRAM(s) Oral <User Schedule>  QUEtiapine 50 milliGRAM(s) Oral <User Schedule>  QUEtiapine 12.5 milliGRAM(s) Oral <User Schedule>    MEDICATIONS  (PRN):  acetaminophen     Tablet .. 650 milliGRAM(s) Oral every 6 hours PRN Temp greater or equal to 38C (100.4F), Mild Pain (1 - 3)  OLANZapine Injectable 1.25 milliGRAM(s) IntraMuscular every 6 hours PRN agitation       Vitals:  Vital Signs Last 24 Hrs  T(C): 36.4 (05 Feb 2024 10:39), Max: 36.4 (04 Feb 2024 22:30)  T(F): 97.6 (05 Feb 2024 10:39), Max: 97.6 (05 Feb 2024 10:39)  HR: 71 (05 Feb 2024 10:39) (63 - 71)  BP: 127/55 (05 Feb 2024 10:39) (102/48 - 130/60)  BP(mean): --  RR: 17 (05 Feb 2024 10:39) (17 - 18)  SpO2: 100% (05 Feb 2024 10:39) (100% - 100%)    Parameters below as of 05 Feb 2024 10:39  Patient On (Oxygen Delivery Method): room air                  General Exam:   General Appearance: Appropriately dressed and in no acute distress       Head: Normocephalic, atraumatic and no dysmorphic features  Ear, Nose, and Throat: Moist mucous membranes  CVS: S1S2+  Resp: No SOB, no wheeze or rhonchi  Abd: soft NTND  Extremities: No edema, no cyanosis  Skin: No bruises, no rashes     Neurological Exam:  Mental Status: Awake, alert and oriented x 1-2.  Able to follow simple commands. Able to name and repeat. fluent speech. No obvious aphasia or dysarthria noted.   Cranial Nerves: PERRL, EOMI, VFFC, sensation V1-V3 intact,  no obvious facial asymmetry, equal elevation of palate, scm/trap 5/5, tongue is midline on protrusion. hearing is grossly intact.   Motor: Normal bulk, tone and strength throughout. Fine finger movements were intact and symmetric. no tremors or drift noted.    Sensation: Intact to light touch and pinprick throughout. no right/left confusion. no extinction to tactile on DSS.   Reflexes: 1+ throughout at biceps, brachioradialis, triceps, patellars and ankles bilaterally and equal. No clonus. R toe and L toe were both downgoing.  Coordination: No dysmetria on FNF  Gait: Narrow based and steady.       I personally reviewed the below data/images/labs:        LABS:      no new labs      < from: CT Head No Cont (01.02.24 @ 17:45) >    ACC: [id number] EXAM:  CT BRAIN   ORDERED BY: MARANDA HAM     PROCEDURE DATE:  01/02/2024          INTERPRETATION:  .    CLINICAL INFORMATION: Confusion. Altered mental status. Dementia.    TECHNIQUE: Multiple axial CT images of the head were obtained without   contrast. Sagittal and coronal reconstructed images were acquired from   the source data.    COMPARISON: No prior CT studies of the brain are available for comparison   at this institution.    FINDINGS: There is no acute intracranial hemorrhage, mass effect, shift   of the midline structures, herniation, extra-axial fluid collection, or   hydrocephalus.    There is diffuse cerebral volume loss with prominence of the sulci,   fissures, and cisternal spaces which is normal for the patient's age.   Moderate ventriculomegaly is seen. 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    The paranasal sinuses and tympanomastoid cavities are clear. The   calvarium is intact. There is evidence of left-sided cataract removal.    IMPRESSION: No acute intracranial hemorrhage, mass effect, or shift of   the midline structures.    < end of copied text >

## 2024-02-05 NOTE — PROGRESS NOTE ADULT - SUBJECTIVE AND OBJECTIVE BOX
Patient is a 83y old  Female who presents with a chief complaint of confusion   2/5/24      SUBJECTIVE / OVERNIGHT EVENTS:pt seen and examined    MEDICATIONS  (STANDING):  melatonin 3 milliGRAM(s) Oral at bedtime  QUEtiapine 25 milliGRAM(s) Oral <User Schedule>  QUEtiapine 50 milliGRAM(s) Oral <User Schedule>  QUEtiapine 12.5 milliGRAM(s) Oral <User Schedule>    MEDICATIONS  (PRN):  acetaminophen     Tablet .. 650 milliGRAM(s) Oral every 6 hours PRN Temp greater or equal to 38C (100.4F), Mild Pain (1 - 3)  OLANZapine Injectable 1.25 milliGRAM(s) IntraMuscular every 6 hours PRN agitation    Vital Signs Last 24 Hrs  T(C): 36.3 (02-05-24 @ 20:10), Max: 36.4 (02-05-24 @ 10:39)  T(F): 97.3 (02-05-24 @ 20:10), Max: 97.6 (02-05-24 @ 10:39)  HR: 78 (02-05-24 @ 20:10) (67 - 78)  BP: 122/56 (02-05-24 @ 20:10) (122/56 - 130/60)  BP(mean): --  RR: 18 (02-05-24 @ 20:10) (17 - 18)  SpO2: 100% (02-05-24 @ 20:10) (100% - 100%)        PHYSICAL EXAM:  GENERAL: NAD  NECK: Supple, No JVD  CHEST/LUNG: dec breath sounds at bases  HEART: Regular rate and rhythm; No murmurs, rubs, or gallops  ABDOMEN: Soft, Nontender, Nondistended; Bowel sounds present  EXTREMITIES:   No edema  NEUROLOGY: Alert awake confused  ambulating on floor without difficulty     LABS:        Creatinine Trend:     Urine Studies:                                                                                                                                                                                                                                                  RADIOLOGY & ADDITIONAL TESTS:    Imaging Personally Reviewed:yes    Consultant(s) Notes Reviewed:  yes    Care Discussed with Consultants/Other Providers:yes

## 2024-02-06 RX ADMIN — Medication 3 MILLIGRAM(S): at 20:03

## 2024-02-06 RX ADMIN — QUETIAPINE FUMARATE 50 MILLIGRAM(S): 200 TABLET, FILM COATED ORAL at 19:19

## 2024-02-06 RX ADMIN — QUETIAPINE FUMARATE 25 MILLIGRAM(S): 200 TABLET, FILM COATED ORAL at 05:32

## 2024-02-06 RX ADMIN — QUETIAPINE FUMARATE 12.5 MILLIGRAM(S): 200 TABLET, FILM COATED ORAL at 13:05

## 2024-02-06 NOTE — PROGRESS NOTE ADULT - SUBJECTIVE AND OBJECTIVE BOX
Patient is a 83y old  Female who presents with a chief complaint of confusion   2/6/24      SUBJECTIVE / OVERNIGHT EVENTS:pt seen and examined    MEDICATIONS  (STANDING):  melatonin 3 milliGRAM(s) Oral at bedtime  QUEtiapine 50 milliGRAM(s) Oral <User Schedule>  QUEtiapine 12.5 milliGRAM(s) Oral <User Schedule>  QUEtiapine 25 milliGRAM(s) Oral <User Schedule>    MEDICATIONS  (PRN):  acetaminophen     Tablet .. 650 milliGRAM(s) Oral every 6 hours PRN Temp greater or equal to 38C (100.4F), Mild Pain (1 - 3)  OLANZapine Injectable 1.25 milliGRAM(s) IntraMuscular every 6 hours PRN agitation    Vital Signs Last 24 Hrs  T(C): 36.7 (02-06-24 @ 19:20), Max: 36.7 (02-06-24 @ 19:20)  T(F): 98.1 (02-06-24 @ 19:20), Max: 98.1 (02-06-24 @ 19:20)  HR: 73 (02-06-24 @ 19:20) (63 - 73)  BP: 132/59 (02-06-24 @ 19:20) (118/90 - 141/55)  BP(mean): --  RR: 16 (02-06-24 @ 19:20) (16 - 18)  SpO2: 100% (02-06-24 @ 19:20) (100% - 100%)      PHYSICAL EXAM:  GENERAL: NAD  NECK: Supple, No JVD  CHEST/LUNG: dec breath sounds at bases  HEART: Regular rate and rhythm; No murmurs, rubs, or gallops  ABDOMEN: Soft, Nontender, Nondistended; Bowel sounds present  EXTREMITIES:   No edema  NEUROLOGY: Alert awake confused  ambulating on floor without difficulty     LABS:        Creatinine Trend:     Urine Studies:                                                                                                                                                                                                                                                  RADIOLOGY & ADDITIONAL TESTS:    Imaging Personally Reviewed:yes    Consultant(s) Notes Reviewed:  yes    Care Discussed with Consultants/Other Providers:yes

## 2024-02-07 RX ADMIN — Medication 3 MILLIGRAM(S): at 20:08

## 2024-02-07 RX ADMIN — QUETIAPINE FUMARATE 12.5 MILLIGRAM(S): 200 TABLET, FILM COATED ORAL at 13:05

## 2024-02-07 RX ADMIN — QUETIAPINE FUMARATE 25 MILLIGRAM(S): 200 TABLET, FILM COATED ORAL at 05:13

## 2024-02-07 RX ADMIN — OLANZAPINE 1.25 MILLIGRAM(S): 15 TABLET, FILM COATED ORAL at 15:57

## 2024-02-07 RX ADMIN — QUETIAPINE FUMARATE 50 MILLIGRAM(S): 200 TABLET, FILM COATED ORAL at 19:20

## 2024-02-07 NOTE — PROGRESS NOTE ADULT - SUBJECTIVE AND OBJECTIVE BOX
Patient is a 83y old  Female who presents with a chief complaint of confusion   2/7/24      SUBJECTIVE / OVERNIGHT EVENTS:pt seen and examined    MEDICATIONS  (STANDING):  melatonin 3 milliGRAM(s) Oral at bedtime  QUEtiapine 12.5 milliGRAM(s) Oral <User Schedule>  QUEtiapine 50 milliGRAM(s) Oral <User Schedule>  QUEtiapine 25 milliGRAM(s) Oral <User Schedule>    MEDICATIONS  (PRN):  acetaminophen     Tablet .. 650 milliGRAM(s) Oral every 6 hours PRN Temp greater or equal to 38C (100.4F), Mild Pain (1 - 3)  OLANZapine Injectable 1.25 milliGRAM(s) IntraMuscular every 6 hours PRN agitation      Vital Signs Last 24 Hrs  T(C): 36.7 (02-07-24 @ 19:11), Max: 36.7 (02-06-24 @ 19:20)  T(F): 98.1 (02-07-24 @ 19:11), Max: 98.1 (02-06-24 @ 19:20)  HR: 88 (02-07-24 @ 19:11) (59 - 88)  BP: 144/72 (02-07-24 @ 19:11) (101/50 - 144/72)  BP(mean): --  RR: 16 (02-07-24 @ 19:11) (16 - 16)  SpO2: 94% (02-07-24 @ 19:11) (94% - 100%)      PHYSICAL EXAM:  GENERAL: NAD  NECK: Supple, No JVD  CHEST/LUNG: dec breath sounds at bases  HEART: Regular rate and rhythm; No murmurs, rubs, or gallops  ABDOMEN: Soft, Nontender, Nondistended; Bowel sounds present  EXTREMITIES:   No edema  NEUROLOGY: Alert awake confused  ambulating on floor without difficulty     LABS:        Creatinine Trend:     Urine Studies:                                                                                                                                                                                                                                                  RADIOLOGY & ADDITIONAL TESTS:    Imaging Personally Reviewed:yes    Consultant(s) Notes Reviewed:  yes    Care Discussed with Consultants/Other Providers:yes

## 2024-02-08 RX ADMIN — OLANZAPINE 1.25 MILLIGRAM(S): 15 TABLET, FILM COATED ORAL at 22:12

## 2024-02-08 RX ADMIN — QUETIAPINE FUMARATE 25 MILLIGRAM(S): 200 TABLET, FILM COATED ORAL at 05:01

## 2024-02-08 RX ADMIN — Medication 3 MILLIGRAM(S): at 21:08

## 2024-02-08 RX ADMIN — QUETIAPINE FUMARATE 12.5 MILLIGRAM(S): 200 TABLET, FILM COATED ORAL at 13:05

## 2024-02-08 RX ADMIN — OLANZAPINE 1.25 MILLIGRAM(S): 15 TABLET, FILM COATED ORAL at 13:51

## 2024-02-08 RX ADMIN — QUETIAPINE FUMARATE 50 MILLIGRAM(S): 200 TABLET, FILM COATED ORAL at 21:07

## 2024-02-08 NOTE — PROGRESS NOTE ADULT - SUBJECTIVE AND OBJECTIVE BOX
Patient is a 83y old  Female who presents with a chief complaint of confusion   2/8/24      SUBJECTIVE / OVERNIGHT EVENTS:pt seen and examined    MEDICATIONS  (STANDING):  melatonin 3 milliGRAM(s) Oral at bedtime  QUEtiapine 50 milliGRAM(s) Oral <User Schedule>  QUEtiapine 12.5 milliGRAM(s) Oral <User Schedule>  QUEtiapine 25 milliGRAM(s) Oral <User Schedule>    MEDICATIONS  (PRN):  acetaminophen     Tablet .. 650 milliGRAM(s) Oral every 6 hours PRN Temp greater or equal to 38C (100.4F), Mild Pain (1 - 3)  OLANZapine Injectable 1.25 milliGRAM(s) IntraMuscular every 6 hours PRN agitation    Vital Signs Last 24 Hrs  T(C): 36.3 (02-08-24 @ 22:20), Max: 36.7 (02-08-24 @ 04:13)  T(F): 97.4 (02-08-24 @ 22:20), Max: 98 (02-08-24 @ 04:13)  HR: 64 (02-08-24 @ 22:20) (57 - 73)  BP: 115/53 (02-08-24 @ 22:20) (112/53 - 126/59)  BP(mean): --  RR: 16 (02-08-24 @ 22:20) (16 - 16)  SpO2: 99% (02-08-24 @ 22:20) (97% - 99%)      PHYSICAL EXAM:  GENERAL: NAD  NECK: Supple, No JVD  CHEST/LUNG: dec breath sounds at bases  HEART: Regular rate and rhythm; No murmurs, rubs, or gallops  ABDOMEN: Soft, Nontender, Nondistended; Bowel sounds present  EXTREMITIES:   No edema  NEUROLOGY: Alert awake confused  ambulating on floor without difficulty     LABS:        Creatinine Trend:     Urine Studies:                                                                                                                                                                                                                                                  RADIOLOGY & ADDITIONAL TESTS:    Imaging Personally Reviewed:yes    Consultant(s) Notes Reviewed:  yes    Care Discussed with Consultants/Other Providers:yes

## 2024-02-09 RX ADMIN — QUETIAPINE FUMARATE 12.5 MILLIGRAM(S): 200 TABLET, FILM COATED ORAL at 15:55

## 2024-02-09 RX ADMIN — QUETIAPINE FUMARATE 50 MILLIGRAM(S): 200 TABLET, FILM COATED ORAL at 21:28

## 2024-02-09 RX ADMIN — Medication 3 MILLIGRAM(S): at 21:28

## 2024-02-09 RX ADMIN — QUETIAPINE FUMARATE 25 MILLIGRAM(S): 200 TABLET, FILM COATED ORAL at 05:27

## 2024-02-09 NOTE — PROGRESS NOTE ADULT - SUBJECTIVE AND OBJECTIVE BOX
Patient is a 83y old  Female who presents with a chief complaint of confusion   2/9/24      SUBJECTIVE / OVERNIGHT EVENTS:pt seen and examined    MEDICATIONS  (STANDING):  melatonin 3 milliGRAM(s) Oral at bedtime  QUEtiapine 25 milliGRAM(s) Oral <User Schedule>  QUEtiapine 50 milliGRAM(s) Oral <User Schedule>  QUEtiapine 12.5 milliGRAM(s) Oral <User Schedule>    MEDICATIONS  (PRN):  acetaminophen     Tablet .. 650 milliGRAM(s) Oral every 6 hours PRN Temp greater or equal to 38C (100.4F), Mild Pain (1 - 3)  OLANZapine Injectable 1.25 milliGRAM(s) IntraMuscular every 6 hours PRN agitation    Vital Signs Last 24 Hrs  T(C): 36.9 (02-09-24 @ 11:10), Max: 36.9 (02-09-24 @ 11:10)  T(F): 98.5 (02-09-24 @ 11:10), Max: 98.5 (02-09-24 @ 11:10)  HR: 65 (02-09-24 @ 11:10) (64 - 65)  BP: 116/52 (02-09-24 @ 11:10) (115/53 - 116/52)  BP(mean): --  RR: 17 (02-09-24 @ 11:10) (16 - 17)  SpO2: 99% (02-09-24 @ 11:10) (99% - 99%)        PHYSICAL EXAM:  GENERAL: NAD  NECK: Supple, No JVD  CHEST/LUNG: dec breath sounds at bases  HEART: Regular rate and rhythm; No murmurs, rubs, or gallops  ABDOMEN: Soft, Nontender, Nondistended; Bowel sounds present  EXTREMITIES:   No edema  NEUROLOGY: Alert awake confused  ambulating on floor without difficulty     LABS:        Creatinine Trend:     Urine Studies:                                                                                                                                                                                                                                                  RADIOLOGY & ADDITIONAL TESTS:    Imaging Personally Reviewed:yes    Consultant(s) Notes Reviewed:  yes    Care Discussed with Consultants/Other Providers:yes

## 2024-02-10 RX ADMIN — QUETIAPINE FUMARATE 25 MILLIGRAM(S): 200 TABLET, FILM COATED ORAL at 05:03

## 2024-02-10 RX ADMIN — QUETIAPINE FUMARATE 50 MILLIGRAM(S): 200 TABLET, FILM COATED ORAL at 21:26

## 2024-02-10 RX ADMIN — QUETIAPINE FUMARATE 12.5 MILLIGRAM(S): 200 TABLET, FILM COATED ORAL at 15:19

## 2024-02-10 RX ADMIN — Medication 3 MILLIGRAM(S): at 21:26

## 2024-02-10 NOTE — PROGRESS NOTE ADULT - SUBJECTIVE AND OBJECTIVE BOX
Patient is a 83y old  Female who presents with a chief complaint of confusion   2/10/24      SUBJECTIVE / OVERNIGHT EVENTS:pt seen and examined    MEDICATIONS  (STANDING):  melatonin 3 milliGRAM(s) Oral at bedtime  QUEtiapine 12.5 milliGRAM(s) Oral <User Schedule>  QUEtiapine 50 milliGRAM(s) Oral <User Schedule>  QUEtiapine 25 milliGRAM(s) Oral <User Schedule>    MEDICATIONS  (PRN):  acetaminophen     Tablet .. 650 milliGRAM(s) Oral every 6 hours PRN Temp greater or equal to 38C (100.4F), Mild Pain (1 - 3)  OLANZapine Injectable 1.25 milliGRAM(s) IntraMuscular every 6 hours PRN agitation    Vital Signs Last 24 Hrs  T(C): 36.7 (02-10-24 @ 21:22), Max: 36.7 (02-10-24 @ 11:20)  T(F): 98.1 (02-10-24 @ 21:22), Max: 98.1 (02-10-24 @ 21:22)  HR: 68 (02-10-24 @ 21:22) (61 - 68)  BP: 100/75 (02-10-24 @ 21:22) (100/75 - 109/64)  BP(mean): --  RR: 16 (02-10-24 @ 21:22) (16 - 17)  SpO2: 100% (02-10-24 @ 21:22) (100% - 100%)        PHYSICAL EXAM:  GENERAL: NAD  NECK: Supple, No JVD  CHEST/LUNG: dec breath sounds at bases  HEART: Regular rate and rhythm; No murmurs, rubs, or gallops  ABDOMEN: Soft, Nontender, Nondistended; Bowel sounds present  EXTREMITIES:   No edema  NEUROLOGY: Alert awake confused  ambulating on floor without difficulty     LABS:        Creatinine Trend:     Urine Studies:                                                                                                                                                                                                                                                  RADIOLOGY & ADDITIONAL TESTS:    Imaging Personally Reviewed:yes    Consultant(s) Notes Reviewed:  yes    Care Discussed with Consultants/Other Providers:yes

## 2024-02-11 RX ADMIN — QUETIAPINE FUMARATE 25 MILLIGRAM(S): 200 TABLET, FILM COATED ORAL at 05:28

## 2024-02-11 RX ADMIN — QUETIAPINE FUMARATE 12.5 MILLIGRAM(S): 200 TABLET, FILM COATED ORAL at 14:04

## 2024-02-11 RX ADMIN — Medication 3 MILLIGRAM(S): at 21:37

## 2024-02-11 RX ADMIN — QUETIAPINE FUMARATE 50 MILLIGRAM(S): 200 TABLET, FILM COATED ORAL at 21:38

## 2024-02-11 NOTE — PROGRESS NOTE ADULT - SUBJECTIVE AND OBJECTIVE BOX
Patient is a 83y old  Female who presents with a chief complaint of confusion   2/11/24      SUBJECTIVE / OVERNIGHT EVENTS:pt seen and examined    MEDICATIONS  (STANDING):  melatonin 3 milliGRAM(s) Oral at bedtime  QUEtiapine 50 milliGRAM(s) Oral <User Schedule>  QUEtiapine 12.5 milliGRAM(s) Oral <User Schedule>  QUEtiapine 25 milliGRAM(s) Oral <User Schedule>    MEDICATIONS  (PRN):  acetaminophen     Tablet .. 650 milliGRAM(s) Oral every 6 hours PRN Temp greater or equal to 38C (100.4F), Mild Pain (1 - 3)  OLANZapine Injectable 1.25 milliGRAM(s) IntraMuscular every 6 hours PRN agitation    Vital Signs Last 24 Hrs  T(C): 36.9 (02-11-24 @ 20:03), Max: 36.9 (02-11-24 @ 20:03)  T(F): 98.5 (02-11-24 @ 20:03), Max: 98.5 (02-11-24 @ 20:03)  HR: 97 (02-11-24 @ 20:03) (67 - 97)  BP: 130/75 (02-11-24 @ 20:03) (130/75 - 137/65)  BP(mean): --  RR: 17 (02-11-24 @ 20:03) (17 - 17)  SpO2: 100% (02-11-24 @ 20:03) (100% - 100%)        PHYSICAL EXAM:  GENERAL: NAD  NECK: Supple, No JVD  CHEST/LUNG: dec breath sounds at bases  HEART: Regular rate and rhythm; No murmurs, rubs, or gallops  ABDOMEN: Soft, Nontender, Nondistended; Bowel sounds present  EXTREMITIES:   No edema  NEUROLOGY: Alert awake confused  ambulating on floor without difficulty     LABS:        Creatinine Trend:     Urine Studies:                                                                                                                                                                                                                                                  RADIOLOGY & ADDITIONAL TESTS:    Imaging Personally Reviewed:yes    Consultant(s) Notes Reviewed:  yes    Care Discussed with Consultants/Other Providers:yes

## 2024-02-12 RX ADMIN — QUETIAPINE FUMARATE 50 MILLIGRAM(S): 200 TABLET, FILM COATED ORAL at 19:14

## 2024-02-12 RX ADMIN — QUETIAPINE FUMARATE 12.5 MILLIGRAM(S): 200 TABLET, FILM COATED ORAL at 15:16

## 2024-02-12 RX ADMIN — Medication 3 MILLIGRAM(S): at 21:20

## 2024-02-12 RX ADMIN — OLANZAPINE 1.25 MILLIGRAM(S): 15 TABLET, FILM COATED ORAL at 00:14

## 2024-02-12 RX ADMIN — QUETIAPINE FUMARATE 25 MILLIGRAM(S): 200 TABLET, FILM COATED ORAL at 06:02

## 2024-02-13 RX ADMIN — QUETIAPINE FUMARATE 12.5 MILLIGRAM(S): 200 TABLET, FILM COATED ORAL at 13:55

## 2024-02-13 RX ADMIN — Medication 3 MILLIGRAM(S): at 20:22

## 2024-02-13 RX ADMIN — QUETIAPINE FUMARATE 25 MILLIGRAM(S): 200 TABLET, FILM COATED ORAL at 05:36

## 2024-02-13 RX ADMIN — QUETIAPINE FUMARATE 50 MILLIGRAM(S): 200 TABLET, FILM COATED ORAL at 20:22

## 2024-02-13 NOTE — PROGRESS NOTE ADULT - SUBJECTIVE AND OBJECTIVE BOX
Patient is a 83y old  Female who presents with a chief complaint of confusion   2/13/24      SUBJECTIVE / OVERNIGHT EVENTS:pt seen and examined    MEDICATIONS  (STANDING):  melatonin 3 milliGRAM(s) Oral at bedtime  QUEtiapine 25 milliGRAM(s) Oral <User Schedule>  QUEtiapine 50 milliGRAM(s) Oral <User Schedule>  QUEtiapine 12.5 milliGRAM(s) Oral <User Schedule>    MEDICATIONS  (PRN):  acetaminophen     Tablet .. 650 milliGRAM(s) Oral every 6 hours PRN Temp greater or equal to 38C (100.4F), Mild Pain (1 - 3)  OLANZapine Injectable 1.25 milliGRAM(s) IntraMuscular every 6 hours PRN agitation    Vital Signs Last 24 Hrs  T(C): 36.8 (02-13-24 @ 19:44), Max: 36.8 (02-13-24 @ 19:44)  T(F): 98.3 (02-13-24 @ 19:44), Max: 98.3 (02-13-24 @ 19:44)  HR: 80 (02-13-24 @ 19:44) (70 - 94)  BP: 134/64 (02-13-24 @ 19:44) (115/65 - 134/64)  BP(mean): --  RR: 18 (02-13-24 @ 19:44) (18 - 18)  SpO2: 98% (02-13-24 @ 19:44) (98% - 99%)            PHYSICAL EXAM:  GENERAL: NAD  NECK: Supple, No JVD  CHEST/LUNG: dec breath sounds at bases  HEART: Regular rate and rhythm; No murmurs, rubs, or gallops  ABDOMEN: Soft, Nontender, Nondistended; Bowel sounds present  EXTREMITIES:   No edema  NEUROLOGY: Alert awake confused  ambulating on floor without difficulty     LABS:        Creatinine Trend:     Urine Studies:                                                                                                                                                                                                                                                  RADIOLOGY & ADDITIONAL TESTS:    Imaging Personally Reviewed:yes    Consultant(s) Notes Reviewed:  yes    Care Discussed with Consultants/Other Providers:yes

## 2024-02-14 RX ADMIN — QUETIAPINE FUMARATE 12.5 MILLIGRAM(S): 200 TABLET, FILM COATED ORAL at 13:28

## 2024-02-14 RX ADMIN — Medication 3 MILLIGRAM(S): at 20:34

## 2024-02-14 RX ADMIN — QUETIAPINE FUMARATE 50 MILLIGRAM(S): 200 TABLET, FILM COATED ORAL at 20:35

## 2024-02-14 RX ADMIN — QUETIAPINE FUMARATE 25 MILLIGRAM(S): 200 TABLET, FILM COATED ORAL at 05:33

## 2024-02-14 NOTE — PROGRESS NOTE ADULT - SUBJECTIVE AND OBJECTIVE BOX
Patient is a 83y old  Female who presents with a chief complaint of confusion   2/14/24      SUBJECTIVE / OVERNIGHT EVENTS:pt seen and examined    MEDICATIONS  (STANDING):  melatonin 3 milliGRAM(s) Oral at bedtime  QUEtiapine 25 milliGRAM(s) Oral <User Schedule>  QUEtiapine 50 milliGRAM(s) Oral <User Schedule>  QUEtiapine 12.5 milliGRAM(s) Oral <User Schedule>    MEDICATIONS  (PRN):  acetaminophen     Tablet .. 650 milliGRAM(s) Oral every 6 hours PRN Temp greater or equal to 38C (100.4F), Mild Pain (1 - 3)  OLANZapine Injectable 1.25 milliGRAM(s) IntraMuscular every 6 hours PRN agitation    Vital Signs Last 24 Hrs  T(C): 36.8 (02-13-24 @ 19:44), Max: 36.8 (02-13-24 @ 19:44)  T(F): 98.3 (02-13-24 @ 19:44), Max: 98.3 (02-13-24 @ 19:44)  HR: 80 (02-13-24 @ 19:44) (70 - 94)  BP: 134/64 (02-13-24 @ 19:44) (115/65 - 134/64)  BP(mean): --  RR: 18 (02-13-24 @ 19:44) (18 - 18)  SpO2: 98% (02-13-24 @ 19:44) (98% - 99%)            PHYSICAL EXAM:  GENERAL: NAD  NECK: Supple, No JVD  CHEST/LUNG: dec breath sounds at bases  HEART: Regular rate and rhythm; No murmurs, rubs, or gallops  ABDOMEN: Soft, Nontender, Nondistended; Bowel sounds present  EXTREMITIES:   No edema  NEUROLOGY: Alert awake confused  ambulating on floor without difficulty     LABS:        Creatinine Trend:     Urine Studies:                                                                                                                                                                                                                                                  RADIOLOGY & ADDITIONAL TESTS:    Imaging Personally Reviewed:yes    Consultant(s) Notes Reviewed:  yes    Care Discussed with Consultants/Other Providers:yes

## 2024-02-15 RX ORDER — HALOPERIDOL DECANOATE 100 MG/ML
1 INJECTION INTRAMUSCULAR ONCE
Refills: 0 | Status: COMPLETED | OUTPATIENT
Start: 2024-02-15 | End: 2024-02-15

## 2024-02-15 RX ADMIN — QUETIAPINE FUMARATE 25 MILLIGRAM(S): 200 TABLET, FILM COATED ORAL at 05:48

## 2024-02-15 RX ADMIN — QUETIAPINE FUMARATE 50 MILLIGRAM(S): 200 TABLET, FILM COATED ORAL at 22:11

## 2024-02-15 RX ADMIN — Medication 3 MILLIGRAM(S): at 22:11

## 2024-02-15 RX ADMIN — QUETIAPINE FUMARATE 12.5 MILLIGRAM(S): 200 TABLET, FILM COATED ORAL at 14:47

## 2024-02-15 RX ADMIN — HALOPERIDOL DECANOATE 1 MILLIGRAM(S): 100 INJECTION INTRAMUSCULAR at 16:50

## 2024-02-15 RX ADMIN — OLANZAPINE 1.25 MILLIGRAM(S): 15 TABLET, FILM COATED ORAL at 16:09

## 2024-02-15 NOTE — PROGRESS NOTE ADULT - SUBJECTIVE AND OBJECTIVE BOX
Patient is a 83y old  Female who presents with a chief complaint of confusion   2/15/24      SUBJECTIVE / OVERNIGHT EVENTS:pt seen and examined    MEDICATIONS  (STANDING):  melatonin 3 milliGRAM(s) Oral at bedtime  QUEtiapine 25 milliGRAM(s) Oral <User Schedule>  QUEtiapine 50 milliGRAM(s) Oral <User Schedule>  QUEtiapine 12.5 milliGRAM(s) Oral <User Schedule>    MEDICATIONS  (PRN):  acetaminophen     Tablet .. 650 milliGRAM(s) Oral every 6 hours PRN Temp greater or equal to 38C (100.4F), Mild Pain (1 - 3)  OLANZapine Injectable 1.25 milliGRAM(s) IntraMuscular every 6 hours PRN agitation    Vital Signs Last 24 Hrs  T(C): 36.5 (02-15-24 @ 11:12), Max: 36.5 (02-15-24 @ 11:12)  T(F): 97.7 (02-15-24 @ 11:12), Max: 97.7 (02-15-24 @ 11:12)  HR: 62 (02-15-24 @ 11:12) (62 - 78)  BP: 128/61 (02-15-24 @ 11:12) (128/61 - 141/62)  BP(mean): --  RR: 17 (02-15-24 @ 11:12) (17 - 18)  SpO2: 100% (02-15-24 @ 11:12) (100% - 100%)          PHYSICAL EXAM:  GENERAL: NAD  NECK: Supple, No JVD  CHEST/LUNG: dec breath sounds at bases  HEART: Regular rate and rhythm; No murmurs, rubs, or gallops  ABDOMEN: Soft, Nontender, Nondistended; Bowel sounds present  EXTREMITIES:   No edema  NEUROLOGY: Alert awake confused  ambulating on floor without difficulty     LABS:        Creatinine Trend:     Urine Studies:                                                                                                                                                                                                                                                RADIOLOGY & ADDITIONAL TESTS:    Imaging Personally Reviewed:yes    Consultant(s) Notes Reviewed:  yes    Care Discussed with Consultants/Other Providers:yes

## 2024-02-16 PROCEDURE — 93970 EXTREMITY STUDY: CPT | Mod: 26

## 2024-02-16 RX ORDER — QUETIAPINE FUMARATE 200 MG/1
25 TABLET, FILM COATED ORAL
Refills: 0 | Status: DISCONTINUED | OUTPATIENT
Start: 2024-02-17 | End: 2024-03-29

## 2024-02-16 RX ORDER — QUETIAPINE FUMARATE 200 MG/1
12.5 TABLET, FILM COATED ORAL ONCE
Refills: 0 | Status: DISCONTINUED | OUTPATIENT
Start: 2024-02-16 | End: 2024-02-16

## 2024-02-16 RX ADMIN — QUETIAPINE FUMARATE 12.5 MILLIGRAM(S): 200 TABLET, FILM COATED ORAL at 14:46

## 2024-02-16 RX ADMIN — QUETIAPINE FUMARATE 50 MILLIGRAM(S): 200 TABLET, FILM COATED ORAL at 21:26

## 2024-02-16 RX ADMIN — QUETIAPINE FUMARATE 25 MILLIGRAM(S): 200 TABLET, FILM COATED ORAL at 05:36

## 2024-02-16 RX ADMIN — Medication 3 MILLIGRAM(S): at 21:26

## 2024-02-16 RX ADMIN — OLANZAPINE 1.25 MILLIGRAM(S): 15 TABLET, FILM COATED ORAL at 21:26

## 2024-02-16 NOTE — BH CONSULTATION LIAISON PROGRESS NOTE - NSBHCONSULTFOLLOWAFTERCARE_PSY_A_CORE FT
No role for inpatient psychiatry admission but warrants a safe discharge and patient might benefit from higher level of dispo like a nursing home.  Follow up with the psychiatrist at the facility

## 2024-02-16 NOTE — BH CONSULTATION LIAISON PROGRESS NOTE - NSBHATTESTCOMMENTATTENDFT_PSY_A_CORE
Chart reviewed, case discussed in morning rounds, pt. seen/evaluated, I agree with above assessment/plan. Patient AAOX2 (self/place), affect is euthymic/smiling, calm though confused/inattentive, often confabulating, denies AVH, denies si and hi, no paranoia or delusions elicited. Registration:3/3, recall: 0/3. Plan as above. 
Patient seen and examined with Dr. Sierra this afternoon. Patient is sitting calmly in her chair. She is anxious. She has very poor memory. Denies SI/HI. No AH/VH elicited. no EPS elicited.  Agree with increasing afternoon dose of seroquel.  If patient showing signs of urinary retention, constipation, abnormal vitals- please call psych CL or psych ER.    Call with questions.
Chart reviewed, case discussed in IDR, pt. seen/evaluated with Dr. Syed, I agree with the above assessment/plan. Patient oriented to self, pleasantly confused/inattentive, confabulating.  No si or hi. Plan as above. 
Patient seen with Dr. Rousseau. She appears calm, slightly anxious and dysphoric. No SI/HI elicited. No overt signs of paranoia elicited. No AH/VH elicited.    Agree with A/P as per above.
met with the patient. case discussed with fellow md impression and plan discussed and agreed upon  confused, disoriented, confabulations. No psychosis, no si or hi  Fellow md tried calling patient's distant cousin to discuss BBW of use of antipsychotics but was unable to reach. No family involved in particular

## 2024-02-16 NOTE — PROGRESS NOTE ADULT - SUBJECTIVE AND OBJECTIVE BOX
Patient is a 83y old  Female who presents with a chief complaint of confusion   2/16/24      SUBJECTIVE / OVERNIGHT EVENTS:pt seen and examined    MEDICATIONS  (STANDING):  melatonin 3 milliGRAM(s) Oral at bedtime  QUEtiapine 50 milliGRAM(s) Oral <User Schedule>  QUEtiapine 25 milliGRAM(s) Oral <User Schedule>    MEDICATIONS  (PRN):  acetaminophen     Tablet .. 650 milliGRAM(s) Oral every 6 hours PRN Temp greater or equal to 38C (100.4F), Mild Pain (1 - 3)  OLANZapine Injectable 1.25 milliGRAM(s) IntraMuscular every 6 hours PRN agitation    Vital Signs Last 24 Hrs  T(C): 36.7 (02-16-24 @ 11:15), Max: 36.7 (02-16-24 @ 05:06)  T(F): 98.1 (02-16-24 @ 11:15), Max: 98.1 (02-16-24 @ 05:06)  HR: 72 (02-16-24 @ 11:15) (72 - 74)  BP: 128/59 (02-16-24 @ 11:15) (128/59 - 132/60)  BP(mean): --  RR: 16 (02-16-24 @ 11:15) (16 - 18)  SpO2: 100% (02-16-24 @ 11:15) (98% - 100%)          PHYSICAL EXAM:  GENERAL: NAD  NECK: Supple, No JVD  CHEST/LUNG: dec breath sounds at bases  HEART: Regular rate and rhythm; No murmurs, rubs, or gallops  ABDOMEN: Soft, Nontender, Nondistended; Bowel sounds present  EXTREMITIES:   No edema  NEUROLOGY: Alert awake confused  ambulating on floor without difficulty     LABS:        Creatinine Trend:     Urine Studies:                                                                                                                                                                                                                                                RADIOLOGY & ADDITIONAL TESTS:    Imaging Personally Reviewed:yes    Consultant(s) Notes Reviewed:  yes    Care Discussed with Consultants/Other Providers:yes

## 2024-02-16 NOTE — BH CONSULTATION LIAISON PROGRESS NOTE - TELEPSYCHIATRY?
Urine culture results reviewed. Culture is growing Pan-sensitive Enterococcus. Enterococcus not covered by Ceftriaxone  Thus, can change abx to Augmentin. Would treat for 5 day course starting from today, 3/14 No

## 2024-02-16 NOTE — BH CONSULTATION LIAISON PROGRESS NOTE - NSBHFUPINTERVALHXFT_PSY_A_CORE
Chart reviewed and case discussed in 7S rounds. Overnight, patient had an episode of agitation including scratching and spitting at unit staff. She received PRNs including olanzapine 1.25 mg IM x 1 and haloperidol 1 mg IM x 1 for agitation. Adherent with standing medications. Per staff, she has been in behavioral control today but appears anxious with pacing.    Today, she is oriented to name only. She is unable to state location ("the [place] where [place]") or year ("the 40s"). Reports good sleep and appetite. Patient feels anxious and worries that someone might harm her on the unit. She accepts reassurance. Patient denies AVH, SI, or HI. She does not remember agitation from yesterday afternoon.

## 2024-02-16 NOTE — BH CONSULTATION LIAISON PROGRESS NOTE - NSBHASSESSMENTFT_PSY_ALL_CORE
84 y/o F with PMH of possible dementia p/w confusion.  Pt was found by bystander (who is a nurse) wandering by a local grocery store and appeared confused.  Bystander brought pt to her home and noted pt's home was in a poor state, and did not seem safe for pt to live alone.  Pt brought to ED by EMS.     On evaluation patient is alert, oriented X1, calm but confused, needed a lot of redirection but was not agitated. in the ED patient found to have UTI and antibiotic was started. with no know past psychiatric history and her history of infection it is most likely to be delirium with underlying dementia. currently patient is calm, did not need any PRNs for the last 2 days.     1/5: Patient orientedX1, confused, not sleeping and getting agitated at night where she requires PRNs.   1/8: Patient is oriented x1. better sleep and no PRNS. Plan to continue Seroquel 25 mg at 8 pm.   1/24: Patient is confused, received Olanzapine PRN PO X1 for agitation in the last 24 hours. Can increase Seroquel to 50 mg at 8 pm.   1/31: Patient is confused, intermittently agitated receiving PRNs, Patient often get agitated due to confusion in the morning and would benefit from a small dose of Seroquel in the morning as it has been working well.   2/16: Received IM olanzapine and haloperidol overnight, likely in setting of sundowning/confusion. Reporting anxiety today. Increase quetiapine to TDD of 100 mg.     Plan:   - Routine observation as pt denies SI/HI  - Does not have capacity to participate in safe discharge planning   - PRNs: For agitation, can use Olanzapine 1.25 mg PO/IM PRN Q6hr.   - INCREASE quetiapine to 25 mg at 6:00, 25 mg at 14:00, and 50 mg at 20:00 for mood, anxiety, and insomnia.  - Medical management per primary team   - Hold antipsychotics if QTc > 500 ms    Dispo: No role for inpatient psychiatry admission, but patient would benefit from higher level of care like a nursing home. 84 y/o F with PMH of possible dementia p/w confusion.  Pt was found by bystander (who is a nurse) wandering by a local grocery store and appeared confused.  Bystander brought pt to her home and noted pt's home was in a poor state, and did not seem safe for pt to live alone.  Pt brought to ED by EMS.     On evaluation patient is alert, oriented X1, calm but confused, needed a lot of redirection but was not agitated. in the ED patient found to have UTI and antibiotic was started. with no know past psychiatric history and her history of infection it is most likely to be delirium with underlying dementia. currently patient is calm, did not need any PRNs for the last 2 days.     1/5: Patient orientedX1, confused, not sleeping and getting agitated at night where she requires PRNs.   1/8: Patient is oriented x1. better sleep and no PRNS. Plan to continue Seroquel 25 mg at 8 pm.   1/24: Patient is confused, received Olanzapine PRN PO X1 for agitation in the last 24 hours. Can increase Seroquel to 50 mg at 8 pm.   1/31: Patient is confused, intermittently agitated receiving PRNs, Patient often get agitated due to confusion in the morning and would benefit from a small dose of Seroquel in the morning as it has been working well.   2/16: Received IM olanzapine and haloperidol overnight, likely in setting of sundowning/confusion. Reporting anxiety today. Increase quetiapine afternoon dose of seroquel (from 12.5mg to 25mg).     Plan:   - Routine observation as pt denies SI/HI  - Does not have capacity to participate in safe discharge planning   - PRNs: For agitation, can use Olanzapine 1.25 mg PO/IM PRN Q6hr.   - INCREASE quetiapine to 25 mg at 6:00, 25 mg at 14:00, and 50 mg at 20:00 for mood, anxiety, and insomnia.  - Medical management per primary team   - Hold antipsychotics if QTc > 500 ms    Dispo: No role for inpatient psychiatry admission, but patient would benefit from higher level of care like a nursing home.

## 2024-02-16 NOTE — CHART NOTE - NSCHARTNOTEFT_GEN_A_CORE
Nutrition Follow-Up/Chart Note         Source: Patient A&Ox1-2        Chart [x ]    Hospital Course:    Per 2/15/24 hospitalist attending chart review, Patient is a 83y old  Female who presents with a chief complaint of confusion     Nutrition Course:  Patient was seen for Length of stay.  Patient offered no complaint of current diet order without restriction.  Patient expressed her appetite is good, likes sweets.  No reported GI issues such as nausea/vomiting/diarrhea/constipation.  Patient was asked if she has difficulty for regular BM due to not on bowel regimen, pt stated that she is not aware of it, BM must be regular so she was not complaining.  Last BM reported 2/4/24 per RN flowsheet.  Patient noted ambulatory on the unit.  Patient noted having 1+ right and left ankle edema per RN flowsheets.  No pressure injury noted.  No new labs since 1/19/24.  Patient is pending disposition.  RD to remain available.           Anthropometrics:   Height (cm): 162.6 (01-31)  Weight (kg): 56.6 (01-31)  BMI (kg/m2): 21.4 (01-31)  IBW: 120 lbs +/-10%, 54.5kg +/-10%    Weight Assessment: no new weight to assess.    __________________ Pertinent Medications__________________   MEDICATIONS  (STANDING):  melatonin 3 milliGRAM(s) Oral at bedtime  QUEtiapine 50 milliGRAM(s) Oral <User Schedule>  QUEtiapine 12.5 milliGRAM(s) Oral <User Schedule>  QUEtiapine 25 milliGRAM(s) Oral <User Schedule>    MEDICATIONS  (PRN):  acetaminophen     Tablet .. 650 milliGRAM(s) Oral every 6 hours PRN Temp greater or equal to 38C (100.4F), Mild Pain (1 - 3)  OLANZapine Injectable 1.25 milliGRAM(s) IntraMuscular every 6 hours PRN agitation      __________________ Pertinent Labs__________________       Estimated Needs Assessment: [ x ] No change in need assessment    Previous Nutrition Diagnosis: Moderate malnutrition    Nutrition Diagnosis is [ x ] ongoing    New Nutrition Diagnosis: [ x ] not applicable    Education:  [ x ] Not applicable 2/2 cognitive deficit    Recommendations:  1. Continue present diet order as it remains appropriate at this time  2. Honor food and fluid preferences as able.    Monitoring and Evaluation:   1. Monitor PO intake, skin integrity, bowel regimen, weight trends, and nutrition pertinent labs.  2. RD to remain available for further nutritional intervention.

## 2024-02-17 RX ADMIN — QUETIAPINE FUMARATE 50 MILLIGRAM(S): 200 TABLET, FILM COATED ORAL at 22:18

## 2024-02-17 RX ADMIN — QUETIAPINE FUMARATE 25 MILLIGRAM(S): 200 TABLET, FILM COATED ORAL at 13:04

## 2024-02-17 RX ADMIN — QUETIAPINE FUMARATE 25 MILLIGRAM(S): 200 TABLET, FILM COATED ORAL at 06:03

## 2024-02-17 RX ADMIN — OLANZAPINE 1.25 MILLIGRAM(S): 15 TABLET, FILM COATED ORAL at 22:18

## 2024-02-17 RX ADMIN — Medication 3 MILLIGRAM(S): at 22:18

## 2024-02-17 NOTE — PROGRESS NOTE ADULT - SUBJECTIVE AND OBJECTIVE BOX
Patient is a 83y old  Female who presents with a chief complaint of confusion   2/17/24      SUBJECTIVE / OVERNIGHT EVENTS:pt seen and examined    MEDICATIONS  (STANDING):  melatonin 3 milliGRAM(s) Oral at bedtime  QUEtiapine 25 milliGRAM(s) Oral <User Schedule>  QUEtiapine 50 milliGRAM(s) Oral <User Schedule>  QUEtiapine 25 milliGRAM(s) Oral <User Schedule>    MEDICATIONS  (PRN):  acetaminophen     Tablet .. 650 milliGRAM(s) Oral every 6 hours PRN Temp greater or equal to 38C (100.4F), Mild Pain (1 - 3)  OLANZapine Injectable 1.25 milliGRAM(s) IntraMuscular every 6 hours PRN agitation    Vital Signs Last 24 Hrs  T(C): 37.1 (02-17-24 @ 04:32), Max: 37.1 (02-17-24 @ 04:32)  T(F): 98.7 (02-17-24 @ 04:32), Max: 98.7 (02-17-24 @ 04:32)  HR: 78 (02-17-24 @ 21:56) (66 - 78)  BP: 142/63 (02-17-24 @ 21:56) (141/88 - 142/63)  BP(mean): --  RR: 18 (02-17-24 @ 21:56) (17 - 18)  SpO2: 100% (02-17-24 @ 21:56) (100% - 100%)          PHYSICAL EXAM:  GENERAL: NAD  NECK: Supple, No JVD  CHEST/LUNG: dec breath sounds at bases  HEART: Regular rate and rhythm; No murmurs, rubs, or gallops  ABDOMEN: Soft, Nontender, Nondistended; Bowel sounds present  EXTREMITIES:   No edema  NEUROLOGY: Alert awake confused  ambulating on floor without difficulty     LABS:        Creatinine Trend:     Urine Studies:                                                                                                                                                                                                                                                RADIOLOGY & ADDITIONAL TESTS:    Imaging Personally Reviewed:yes    Consultant(s) Notes Reviewed:  yes    Care Discussed with Consultants/Other Providers:yes

## 2024-02-18 RX ADMIN — QUETIAPINE FUMARATE 50 MILLIGRAM(S): 200 TABLET, FILM COATED ORAL at 20:50

## 2024-02-18 RX ADMIN — QUETIAPINE FUMARATE 25 MILLIGRAM(S): 200 TABLET, FILM COATED ORAL at 06:22

## 2024-02-18 RX ADMIN — QUETIAPINE FUMARATE 25 MILLIGRAM(S): 200 TABLET, FILM COATED ORAL at 13:30

## 2024-02-18 RX ADMIN — Medication 3 MILLIGRAM(S): at 21:03

## 2024-02-18 NOTE — PROGRESS NOTE ADULT - SUBJECTIVE AND OBJECTIVE BOX
Patient is a 83y old  Female who presents with a chief complaint of confusion   2/18/24      SUBJECTIVE / OVERNIGHT EVENTS:pt seen and examined    MEDICATIONS  (STANDING):  melatonin 3 milliGRAM(s) Oral at bedtime  QUEtiapine 25 milliGRAM(s) Oral <User Schedule>  QUEtiapine 50 milliGRAM(s) Oral <User Schedule>  QUEtiapine 25 milliGRAM(s) Oral <User Schedule>    MEDICATIONS  (PRN):  acetaminophen     Tablet .. 650 milliGRAM(s) Oral every 6 hours PRN Temp greater or equal to 38C (100.4F), Mild Pain (1 - 3)  OLANZapine Injectable 1.25 milliGRAM(s) IntraMuscular every 6 hours PRN agitation    Vital Signs Last 24 Hrs  T(C): 36.9 (02-18-24 @ 19:40), Max: 36.9 (02-18-24 @ 19:40)  T(F): 98.5 (02-18-24 @ 19:40), Max: 98.5 (02-18-24 @ 19:40)  HR: 69 (02-18-24 @ 19:40) (69 - 75)  BP: 110/53 (02-18-24 @ 19:40) (110/53 - 116/53)  BP(mean): --  RR: 18 (02-18-24 @ 19:40) (17 - 18)  SpO2: 97% (02-18-24 @ 19:40) (97% - 100%)            PHYSICAL EXAM:  GENERAL: NAD  NECK: Supple, No JVD  CHEST/LUNG: dec breath sounds at bases  HEART: Regular rate and rhythm; No murmurs, rubs, or gallops  ABDOMEN: Soft, Nontender, Nondistended; Bowel sounds present  EXTREMITIES:   No edema  NEUROLOGY: Alert awake confused  ambulating on floor without difficulty     LABS:        Creatinine Trend:     Urine Studies:                                                                                                                                                                                                                                                RADIOLOGY & ADDITIONAL TESTS:    Imaging Personally Reviewed:yes    Consultant(s) Notes Reviewed:  yes    Care Discussed with Consultants/Other Providers:yes

## 2024-02-19 RX ADMIN — QUETIAPINE FUMARATE 25 MILLIGRAM(S): 200 TABLET, FILM COATED ORAL at 14:11

## 2024-02-19 RX ADMIN — Medication 3 MILLIGRAM(S): at 21:17

## 2024-02-19 RX ADMIN — QUETIAPINE FUMARATE 50 MILLIGRAM(S): 200 TABLET, FILM COATED ORAL at 20:09

## 2024-02-19 RX ADMIN — QUETIAPINE FUMARATE 25 MILLIGRAM(S): 200 TABLET, FILM COATED ORAL at 05:19

## 2024-02-19 NOTE — PROGRESS NOTE ADULT - SUBJECTIVE AND OBJECTIVE BOX
Patient is a 83y old  Female who presents with a chief complaint of confusion   2/19/24      SUBJECTIVE / OVERNIGHT EVENTS:pt seen and examined    MEDICATIONS  (STANDING):  melatonin 3 milliGRAM(s) Oral at bedtime  QUEtiapine 25 milliGRAM(s) Oral <User Schedule>  QUEtiapine 25 milliGRAM(s) Oral <User Schedule>  QUEtiapine 50 milliGRAM(s) Oral <User Schedule>    MEDICATIONS  (PRN):  acetaminophen     Tablet .. 650 milliGRAM(s) Oral every 6 hours PRN Temp greater or equal to 38C (100.4F), Mild Pain (1 - 3)  OLANZapine Injectable 1.25 milliGRAM(s) IntraMuscular every 6 hours PRN agitation    Vital Signs Last 24 Hrs  T(C): 36.7 (02-19-24 @ 11:05), Max: 36.9 (02-18-24 @ 19:40)  T(F): 98 (02-19-24 @ 11:05), Max: 98.5 (02-18-24 @ 19:40)  HR: 62 (02-19-24 @ 11:05) (57 - 69)  BP: 111/64 (02-19-24 @ 11:05) (110/53 - 118/58)  BP(mean): --  RR: 18 (02-19-24 @ 11:05) (18 - 18)  SpO2: 100% (02-19-24 @ 11:05) (97% - 100%)            PHYSICAL EXAM:  GENERAL: NAD  NECK: Supple, No JVD  CHEST/LUNG: dec breath sounds at bases  HEART: Regular rate and rhythm; No murmurs, rubs, or gallops  ABDOMEN: Soft, Nontender, Nondistended; Bowel sounds present  EXTREMITIES:   No edema  NEUROLOGY: Alert awake confused  ambulating on floor without difficulty     LABS:        Creatinine Trend:     Urine Studies:                                                                                                                                                                                                                                                RADIOLOGY & ADDITIONAL TESTS:    Imaging Personally Reviewed:yes    Consultant(s) Notes Reviewed:  yes    Care Discussed with Consultants/Other Providers:yes

## 2024-02-20 PROCEDURE — 99232 SBSQ HOSP IP/OBS MODERATE 35: CPT

## 2024-02-20 RX ADMIN — QUETIAPINE FUMARATE 25 MILLIGRAM(S): 200 TABLET, FILM COATED ORAL at 13:52

## 2024-02-20 RX ADMIN — OLANZAPINE 1.25 MILLIGRAM(S): 15 TABLET, FILM COATED ORAL at 19:24

## 2024-02-20 RX ADMIN — QUETIAPINE FUMARATE 25 MILLIGRAM(S): 200 TABLET, FILM COATED ORAL at 05:02

## 2024-02-20 RX ADMIN — QUETIAPINE FUMARATE 50 MILLIGRAM(S): 200 TABLET, FILM COATED ORAL at 20:04

## 2024-02-20 RX ADMIN — Medication 3 MILLIGRAM(S): at 21:37

## 2024-02-20 NOTE — BH CONSULTATION LIAISON PROGRESS NOTE - NSBHASSESSMENTFT_PSY_ALL_CORE
84 y/o F with PMH of possible dementia p/w confusion.  Pt was found by bystander (who is a nurse) wandering by a local grocery store and appeared confused.  Bystander brought pt to her home and noted pt's home was in a poor state, and did not seem safe for pt to live alone.  Pt brought to ED by EMS.     On evaluation patient is alert, oriented X1, calm but confused, needed a lot of redirection but was not agitated. in the ED patient found to have UTI and antibiotic was started. with no know past psychiatric history and her history of infection it is most likely to be delirium with underlying dementia. currently patient is calm, did not need any PRNs for the last 2 days.     1/5: Patient orientedX1, confused, not sleeping and getting agitated at night where she requires PRNs.   1/8: Patient is oriented x1. better sleep and no PRNS. Plan to continue Seroquel 25 mg at 8 pm.   1/24: Patient is confused, received Olanzapine PRN PO X1 for agitation in the last 24 hours. Can increase Seroquel to 50 mg at 8 pm.   1/31: Patient is confused, intermittently agitated receiving PRNs, Patient often get agitated due to confusion in the morning and would benefit from a small dose of Seroquel in the morning as it has been working well.   2/16: Received IM olanzapine and haloperidol overnight, likely in setting of sundowning/confusion. Reporting anxiety today. Increase quetiapine afternoon dose of seroquel (from 12.5mg to 25mg).   2/20: much improved, no recent PRNs. Calm, pleasant, poor insight (baseline).    Plan:   - Routine observation as pt denies SI/HI  - Does not have capacity to participate in safe discharge planning   - PRNs: For agitation, can use Olanzapine 1.25 mg PO/IM PRN Q6hr.   - C/W quetiapine 25 mg at 6:00, 25 mg at 14:00, and 50 mg at 20:00 for mood, anxiety, and insomnia.  - Medical management per primary team   - Hold antipsychotics if QTc > 500 ms    Dispo: No role for inpatient psychiatry admission, but patient would benefit from higher level of care like a nursing home.

## 2024-02-20 NOTE — PROGRESS NOTE ADULT - SUBJECTIVE AND OBJECTIVE BOX
Patient is a 83y old  Female who presents with a chief complaint of confusion   2/20/24      SUBJECTIVE / OVERNIGHT EVENTS:pt seen and examined    MEDICATIONS  (STANDING):  melatonin 3 milliGRAM(s) Oral at bedtime  QUEtiapine 50 milliGRAM(s) Oral <User Schedule>  QUEtiapine 25 milliGRAM(s) Oral <User Schedule>  QUEtiapine 25 milliGRAM(s) Oral <User Schedule>    MEDICATIONS  (PRN):  acetaminophen     Tablet .. 650 milliGRAM(s) Oral every 6 hours PRN Temp greater or equal to 38C (100.4F), Mild Pain (1 - 3)  OLANZapine Injectable 1.25 milliGRAM(s) IntraMuscular every 6 hours PRN agitation    Vital Signs Last 24 Hrs  T(C): 36.8 (02-20-24 @ 20:42), Max: 36.8 (02-20-24 @ 20:42)  T(F): 98.2 (02-20-24 @ 20:42), Max: 98.2 (02-20-24 @ 20:42)  HR: 61 (02-20-24 @ 20:42) (61 - 68)  BP: 125/56 (02-20-24 @ 20:42) (122/56 - 152/52)  BP(mean): 80 (02-20-24 @ 05:12) (80 - 80)  RR: 18 (02-20-24 @ 20:42) (17 - 18)  SpO2: 100% (02-20-24 @ 20:42) (100% - 100%)            PHYSICAL EXAM:  GENERAL: NAD  NECK: Supple, No JVD  CHEST/LUNG: dec breath sounds at bases  HEART: Regular rate and rhythm; No murmurs, rubs, or gallops  ABDOMEN: Soft, Nontender, Nondistended; Bowel sounds present  EXTREMITIES:   No edema  NEUROLOGY: Alert awake confused  ambulating on floor without difficulty     LABS:        Creatinine Trend:     Urine Studies:                                                                                                                                                                                                                                                RADIOLOGY & ADDITIONAL TESTS:    Imaging Personally Reviewed:yes    Consultant(s) Notes Reviewed:  yes    Care Discussed with Consultants/Other Providers:yes

## 2024-02-20 NOTE — BH CONSULTATION LIAISON PROGRESS NOTE - MSE UNSTRUCTURED FT
On exam, she is calmly sitting in chair in hallway. Oriented to self. Pleasant, smiling. Holding alisa bear, says it was a "gift." Denies physical pain/discomfort and denies psychiatric distress. No SI, HI, AVH, paranoia/safety concerns. Insight poor (baseline)

## 2024-02-21 RX ADMIN — Medication 3 MILLIGRAM(S): at 21:15

## 2024-02-21 RX ADMIN — QUETIAPINE FUMARATE 50 MILLIGRAM(S): 200 TABLET, FILM COATED ORAL at 19:25

## 2024-02-21 RX ADMIN — QUETIAPINE FUMARATE 25 MILLIGRAM(S): 200 TABLET, FILM COATED ORAL at 05:47

## 2024-02-21 RX ADMIN — QUETIAPINE FUMARATE 25 MILLIGRAM(S): 200 TABLET, FILM COATED ORAL at 14:00

## 2024-02-21 NOTE — PROGRESS NOTE ADULT - SUBJECTIVE AND OBJECTIVE BOX
Patient is a 83y old  Female who presents with a chief complaint of confusion   2/21/24      SUBJECTIVE / OVERNIGHT EVENTS:pt seen and examined    MEDICATIONS  (STANDING):  melatonin 3 milliGRAM(s) Oral at bedtime  QUEtiapine 25 milliGRAM(s) Oral <User Schedule>  QUEtiapine 25 milliGRAM(s) Oral <User Schedule>  QUEtiapine 50 milliGRAM(s) Oral <User Schedule>    MEDICATIONS  (PRN):  acetaminophen     Tablet .. 650 milliGRAM(s) Oral every 6 hours PRN Temp greater or equal to 38C (100.4F), Mild Pain (1 - 3)  OLANZapine Injectable 1.25 milliGRAM(s) IntraMuscular every 6 hours PRN agitation    Vital Signs Last 24 Hrs  T(C): 36.5 (02-21-24 @ 19:31), Max: 36.8 (02-21-24 @ 13:29)  T(F): 97.7 (02-21-24 @ 19:31), Max: 98.2 (02-21-24 @ 13:29)  HR: 80 (02-21-24 @ 19:31) (61 - 80)  BP: 143/64 (02-21-24 @ 19:31) (112/59 - 143/64)  BP(mean): --  RR: 18 (02-21-24 @ 19:31) (18 - 18)  SpO2: 100% (02-21-24 @ 19:31) (100% - 100%)              PHYSICAL EXAM:  GENERAL: NAD  NECK: Supple, No JVD  CHEST/LUNG: dec breath sounds at bases  HEART: Regular rate and rhythm; No murmurs, rubs, or gallops  ABDOMEN: Soft, Nontender, Nondistended; Bowel sounds present  EXTREMITIES:   No edema  NEUROLOGY: Alert awake confused  ambulating on floor without difficulty     LABS:        Creatinine Trend:     Urine Studies:                                                                                                                                                                                                                                                RADIOLOGY & ADDITIONAL TESTS:    Imaging Personally Reviewed:yes    Consultant(s) Notes Reviewed:  yes    Care Discussed with Consultants/Other Providers:yes

## 2024-02-22 RX ADMIN — QUETIAPINE FUMARATE 50 MILLIGRAM(S): 200 TABLET, FILM COATED ORAL at 21:47

## 2024-02-22 RX ADMIN — QUETIAPINE FUMARATE 25 MILLIGRAM(S): 200 TABLET, FILM COATED ORAL at 05:53

## 2024-02-22 RX ADMIN — QUETIAPINE FUMARATE 25 MILLIGRAM(S): 200 TABLET, FILM COATED ORAL at 13:58

## 2024-02-22 RX ADMIN — Medication 3 MILLIGRAM(S): at 21:47

## 2024-02-22 NOTE — PROGRESS NOTE ADULT - SUBJECTIVE AND OBJECTIVE BOX
Patient is a 83y old  Female who presents with a chief complaint of confusion   2/22/24      SUBJECTIVE / OVERNIGHT EVENTS:pt seen and examined    MEDICATIONS  (STANDING):  melatonin 3 milliGRAM(s) Oral at bedtime  QUEtiapine 25 milliGRAM(s) Oral <User Schedule>  QUEtiapine 25 milliGRAM(s) Oral <User Schedule>  QUEtiapine 50 milliGRAM(s) Oral <User Schedule>    MEDICATIONS  (PRN):  acetaminophen     Tablet .. 650 milliGRAM(s) Oral every 6 hours PRN Temp greater or equal to 38C (100.4F), Mild Pain (1 - 3)  OLANZapine Injectable 1.25 milliGRAM(s) IntraMuscular every 6 hours PRN agitation    Vital Signs Last 24 Hrs  T(C): 36.6 (02-22-24 @ 22:27), Max: 36.7 (02-22-24 @ 11:34)  T(F): 97.9 (02-22-24 @ 22:27), Max: 98.1 (02-22-24 @ 11:34)  HR: 71 (02-22-24 @ 22:27) (57 - 72)  BP: 128/61 (02-22-24 @ 22:27) (114/60 - 128/61)  BP(mean): --  RR: 17 (02-22-24 @ 22:27) (17 - 18)  SpO2: 100% (02-22-24 @ 22:27) (100% - 100%)              PHYSICAL EXAM:  GENERAL: NAD  NECK: Supple, No JVD  CHEST/LUNG: dec breath sounds at bases  HEART: Regular rate and rhythm; No murmurs, rubs, or gallops  ABDOMEN: Soft, Nontender, Nondistended; Bowel sounds present  EXTREMITIES:   No edema  NEUROLOGY: Alert awake confused  ambulating on floor without difficulty     LABS:        Creatinine Trend:     Urine Studies:                                                                                                                                                                                                                                                RADIOLOGY & ADDITIONAL TESTS:    Imaging Personally Reviewed:yes    Consultant(s) Notes Reviewed:  yes    Care Discussed with Consultants/Other Providers:yes

## 2024-02-23 RX ADMIN — QUETIAPINE FUMARATE 25 MILLIGRAM(S): 200 TABLET, FILM COATED ORAL at 13:33

## 2024-02-23 RX ADMIN — QUETIAPINE FUMARATE 25 MILLIGRAM(S): 200 TABLET, FILM COATED ORAL at 06:23

## 2024-02-23 RX ADMIN — QUETIAPINE FUMARATE 50 MILLIGRAM(S): 200 TABLET, FILM COATED ORAL at 21:51

## 2024-02-23 RX ADMIN — Medication 3 MILLIGRAM(S): at 21:51

## 2024-02-23 NOTE — PROGRESS NOTE ADULT - SUBJECTIVE AND OBJECTIVE BOX
Patient is a 83y old  Female who presents with a chief complaint of confusion   2/23/24      SUBJECTIVE / OVERNIGHT EVENTS:pt seen and examined    MEDICATIONS  (STANDING):  melatonin 3 milliGRAM(s) Oral at bedtime  QUEtiapine 25 milliGRAM(s) Oral <User Schedule>  QUEtiapine 50 milliGRAM(s) Oral <User Schedule>  QUEtiapine 25 milliGRAM(s) Oral <User Schedule>    MEDICATIONS  (PRN):  acetaminophen     Tablet .. 650 milliGRAM(s) Oral every 6 hours PRN Temp greater or equal to 38C (100.4F), Mild Pain (1 - 3)  artificial tears (preservative free) Ophthalmic Solution 1 Drop(s) Both EYES three times a day PRN Dry Eyes  OLANZapine Injectable 1.25 milliGRAM(s) IntraMuscular every 6 hours PRN agitation    Vital Signs Last 24 Hrs  T(C): 36.3 (02-23-24 @ 05:00), Max: 36.3 (02-23-24 @ 05:00)  T(F): 97.3 (02-23-24 @ 05:00), Max: 97.3 (02-23-24 @ 05:00)  HR: 72 (02-23-24 @ 05:00) (72 - 72)  BP: 122/66 (02-23-24 @ 05:00) (122/66 - 122/66)  BP(mean): --  RR: 18 (02-23-24 @ 05:00) (18 - 18)  SpO2: 100% (02-23-24 @ 05:00) (100% - 100%)                PHYSICAL EXAM:  GENERAL: NAD  NECK: Supple, No JVD  CHEST/LUNG: dec breath sounds at bases  HEART: Regular rate and rhythm; No murmurs, rubs, or gallops  ABDOMEN: Soft, Nontender, Nondistended; Bowel sounds present  EXTREMITIES:   No edema  NEUROLOGY: Alert awake confused  ambulating on floor without difficulty     LABS:        Creatinine Trend:     Urine Studies:                                                                                                                                                                                                                                                RADIOLOGY & ADDITIONAL TESTS:    Imaging Personally Reviewed:yes    Consultant(s) Notes Reviewed:  yes    Care Discussed with Consultants/Other Providers:yes

## 2024-02-24 RX ADMIN — Medication 3 MILLIGRAM(S): at 22:37

## 2024-02-24 RX ADMIN — QUETIAPINE FUMARATE 25 MILLIGRAM(S): 200 TABLET, FILM COATED ORAL at 06:13

## 2024-02-24 RX ADMIN — QUETIAPINE FUMARATE 25 MILLIGRAM(S): 200 TABLET, FILM COATED ORAL at 13:02

## 2024-02-24 RX ADMIN — QUETIAPINE FUMARATE 50 MILLIGRAM(S): 200 TABLET, FILM COATED ORAL at 22:36

## 2024-02-24 RX ADMIN — OLANZAPINE 1.25 MILLIGRAM(S): 15 TABLET, FILM COATED ORAL at 13:35

## 2024-02-24 NOTE — PROGRESS NOTE ADULT - SUBJECTIVE AND OBJECTIVE BOX
Patient is a 83y old  Female who presents with a chief complaint of confusion   2/24/24      SUBJECTIVE / OVERNIGHT EVENTS:pt seen and examined    MEDICATIONS  (STANDING):  melatonin 3 milliGRAM(s) Oral at bedtime  QUEtiapine 25 milliGRAM(s) Oral <User Schedule>  QUEtiapine 50 milliGRAM(s) Oral <User Schedule>  QUEtiapine 25 milliGRAM(s) Oral <User Schedule>    MEDICATIONS  (PRN):  acetaminophen     Tablet .. 650 milliGRAM(s) Oral every 6 hours PRN Temp greater or equal to 38C (100.4F), Mild Pain (1 - 3)  artificial tears (preservative free) Ophthalmic Solution 1 Drop(s) Both EYES three times a day PRN Dry Eyes  OLANZapine Injectable 1.25 milliGRAM(s) IntraMuscular every 6 hours PRN agitation    Vital Signs Last 24 Hrs  T(C): 36.3 (02-23-24 @ 05:00), Max: 36.3 (02-23-24 @ 05:00)  T(F): 97.3 (02-23-24 @ 05:00), Max: 97.3 (02-23-24 @ 05:00)  HR: 72 (02-23-24 @ 05:00) (72 - 72)  BP: 122/66 (02-23-24 @ 05:00) (122/66 - 122/66)  BP(mean): --  RR: 18 (02-23-24 @ 05:00) (18 - 18)  SpO2: 100% (02-23-24 @ 05:00) (100% - 100%)                PHYSICAL EXAM:  GENERAL: NAD  NECK: Supple, No JVD  CHEST/LUNG: dec breath sounds at bases  HEART: Regular rate and rhythm; No murmurs, rubs, or gallops  ABDOMEN: Soft, Nontender, Nondistended; Bowel sounds present  EXTREMITIES:   No edema  NEUROLOGY: Alert awake confused  ambulating on floor without difficulty     LABS:        Creatinine Trend:     Urine Studies:                                                                                                                                                                                                                                                RADIOLOGY & ADDITIONAL TESTS:    Imaging Personally Reviewed:yes    Consultant(s) Notes Reviewed:  yes    Care Discussed with Consultants/Other Providers:yes

## 2024-02-25 RX ADMIN — Medication 3 MILLIGRAM(S): at 21:53

## 2024-02-25 RX ADMIN — OLANZAPINE 1.25 MILLIGRAM(S): 15 TABLET, FILM COATED ORAL at 23:00

## 2024-02-25 RX ADMIN — QUETIAPINE FUMARATE 50 MILLIGRAM(S): 200 TABLET, FILM COATED ORAL at 21:53

## 2024-02-25 RX ADMIN — QUETIAPINE FUMARATE 25 MILLIGRAM(S): 200 TABLET, FILM COATED ORAL at 12:37

## 2024-02-25 RX ADMIN — QUETIAPINE FUMARATE 25 MILLIGRAM(S): 200 TABLET, FILM COATED ORAL at 05:15

## 2024-02-25 NOTE — PROGRESS NOTE ADULT - SUBJECTIVE AND OBJECTIVE BOX
Patient is a 83y old  Female who presents with a chief complaint of confusion   2/25/24      SUBJECTIVE / OVERNIGHT EVENTS:pt seen and examined    MEDICATIONS  (STANDING):  melatonin 3 milliGRAM(s) Oral at bedtime  QUEtiapine 25 milliGRAM(s) Oral <User Schedule>  QUEtiapine 25 milliGRAM(s) Oral <User Schedule>  QUEtiapine 50 milliGRAM(s) Oral <User Schedule>    MEDICATIONS  (PRN):  acetaminophen     Tablet .. 650 milliGRAM(s) Oral every 6 hours PRN Temp greater or equal to 38C (100.4F), Mild Pain (1 - 3)  artificial tears (preservative free) Ophthalmic Solution 1 Drop(s) Both EYES three times a day PRN Dry Eyes  OLANZapine Injectable 1.25 milliGRAM(s) IntraMuscular every 6 hours PRN agitation    Vital Signs Last 24 Hrs  T(C): 36.3 (02-25-24 @ 22:02), Max: 36.6 (02-25-24 @ 04:30)  T(F): 97.4 (02-25-24 @ 22:02), Max: 97.9 (02-25-24 @ 04:30)  HR: 69 (02-25-24 @ 22:02) (69 - 80)  BP: 120/69 (02-25-24 @ 22:02) (120/69 - 146/56)  BP(mean): --  RR: 18 (02-25-24 @ 22:02) (17 - 18)  SpO2: 100% (02-25-24 @ 22:02) (100% - 100%)              PHYSICAL EXAM:  GENERAL: NAD  NECK: Supple, No JVD  CHEST/LUNG: dec breath sounds at bases  HEART: Regular rate and rhythm; No murmurs, rubs, or gallops  ABDOMEN: Soft, Nontender, Nondistended; Bowel sounds present  EXTREMITIES:   No edema  NEUROLOGY: Alert awake confused  ambulating on floor without difficulty     LABS:        Creatinine Trend:     Urine Studies:                                                                                                                                                                                                                                                RADIOLOGY & ADDITIONAL TESTS:    Imaging Personally Reviewed:yes    Consultant(s) Notes Reviewed:  yes    Care Discussed with Consultants/Other Providers:yes

## 2024-02-26 RX ADMIN — Medication 3 MILLIGRAM(S): at 21:48

## 2024-02-26 RX ADMIN — QUETIAPINE FUMARATE 25 MILLIGRAM(S): 200 TABLET, FILM COATED ORAL at 14:12

## 2024-02-26 RX ADMIN — QUETIAPINE FUMARATE 50 MILLIGRAM(S): 200 TABLET, FILM COATED ORAL at 21:48

## 2024-02-26 RX ADMIN — QUETIAPINE FUMARATE 25 MILLIGRAM(S): 200 TABLET, FILM COATED ORAL at 06:45

## 2024-02-26 NOTE — ADVANCED PRACTICE NURSE CONSULT - RECOMMEDATIONS
Topical Recommendations    R forearm: Cleanse with NS, pat dry. Apply Liquid barrier film to periwound skin (allow to dry). Apply silicone contact layer (adaptic touch) to base of wound. Secure with silicone foam with borders, change every other day and PRN if soiled.     Continue low air loss bed therapy, continue heel elevation, continue to turn & reposition per protocol, soft pillow between bony prominences, continue moisture management with barrier creams & single breathable pad, continue measures to decrease friction/shear/pressure. Continue with nutritional support as per dietary/orders.     Plan of care discussed with primary RN and primary ACP Tamir Durant.    Please contact Wound Care Service Line if we can be of further assistance (ext [phone]).

## 2024-02-26 NOTE — PROGRESS NOTE ADULT - SUBJECTIVE AND OBJECTIVE BOX
Patient is a 83y old  Female who presents with a chief complaint of confusion   2/26/24      SUBJECTIVE / OVERNIGHT EVENTS:pt seen and examined    MEDICATIONS  (STANDING):  melatonin 3 milliGRAM(s) Oral at bedtime  QUEtiapine 25 milliGRAM(s) Oral <User Schedule>  QUEtiapine 25 milliGRAM(s) Oral <User Schedule>  QUEtiapine 50 milliGRAM(s) Oral <User Schedule>    MEDICATIONS  (PRN):  acetaminophen     Tablet .. 650 milliGRAM(s) Oral every 6 hours PRN Temp greater or equal to 38C (100.4F), Mild Pain (1 - 3)  artificial tears (preservative free) Ophthalmic Solution 1 Drop(s) Both EYES three times a day PRN Dry Eyes  OLANZapine Injectable 1.25 milliGRAM(s) IntraMuscular every 6 hours PRN agitation    Vital Signs Last 24 Hrs  T(C): 36.3 (02-25-24 @ 22:02), Max: 36.6 (02-25-24 @ 04:30)  T(F): 97.4 (02-25-24 @ 22:02), Max: 97.9 (02-25-24 @ 04:30)  HR: 69 (02-25-24 @ 22:02) (69 - 80)  BP: 120/69 (02-25-24 @ 22:02) (120/69 - 146/56)  BP(mean): --  RR: 18 (02-25-24 @ 22:02) (17 - 18)  SpO2: 100% (02-25-24 @ 22:02) (100% - 100%)              PHYSICAL EXAM:  GENERAL: NAD  NECK: Supple, No JVD  CHEST/LUNG: dec breath sounds at bases  HEART: Regular rate and rhythm; No murmurs, rubs, or gallops  ABDOMEN: Soft, Nontender, Nondistended; Bowel sounds present  EXTREMITIES:   No edema  NEUROLOGY: Alert awake confused  ambulating on floor without difficulty     LABS:        Creatinine Trend:     Urine Studies:                                                                                                                                                                                                                                                RADIOLOGY & ADDITIONAL TESTS:    Imaging Personally Reviewed:yes    Consultant(s) Notes Reviewed:  yes    Care Discussed with Consultants/Other Providers:yes

## 2024-02-26 NOTE — ADVANCED PRACTICE NURSE CONSULT - ASSESSMENT
General: A&Ox3, OOB self, continent of urine and stool. Skin warm, dry with increased moisture in intertriginous folds, adequate skin turgor, scattered areas of hyperpigmentation and hypopigmentation, scattered areas of ecchymosis on bilateral upper extremities with no hematomas. Blanchable erythema on bilateral heels.     R forearm: Category 1 skin tear in close approximation to category 3 skin tear, both exposing 100% red, moist dermis w/ scant serosanguinous drainage w/ cleansing. Periwound intact. No induration, no erythema, no crepitus, no fluctuance, no edema, no temperature changes, no overt signs of infection noted. Goals of care: atraumatic dressing changes.

## 2024-02-26 NOTE — ADVANCED PRACTICE NURSE CONSULT - REASON FOR CONSULT
Patient seen on skin care rounds after wound care referral received for assessment of skin impairment and recommendations of topical management of the R forearm skin tear. As per H&P, patient is a 82 y/o F with pmh of possible dementia p/w confusion. and found to have UTI.

## 2024-02-27 RX ADMIN — QUETIAPINE FUMARATE 25 MILLIGRAM(S): 200 TABLET, FILM COATED ORAL at 05:39

## 2024-02-27 RX ADMIN — QUETIAPINE FUMARATE 50 MILLIGRAM(S): 200 TABLET, FILM COATED ORAL at 21:21

## 2024-02-27 RX ADMIN — Medication 3 MILLIGRAM(S): at 21:21

## 2024-02-27 RX ADMIN — QUETIAPINE FUMARATE 25 MILLIGRAM(S): 200 TABLET, FILM COATED ORAL at 14:07

## 2024-02-27 NOTE — PROGRESS NOTE ADULT - SUBJECTIVE AND OBJECTIVE BOX
SUBJECTIVE/ OVERNIGHT EVENTS:  confused but calm.  comfortable.  denied pain.  no cp, no sob, no n/v/d. no abdominal pain.  no headache, no dizziness.   await placement.     --------------------------------------------------------------------------------------------  LABS:            CAPILLARY BLOOD GLUCOSE                RADIOLOGY & ADDITIONAL TESTS:    Imaging Personally Reviewed:  [x] YES  [ ] NO    Consultant(s) Notes Reviewed:  [x] YES  [ ] NO    MEDICATIONS  (STANDING):  melatonin 3 milliGRAM(s) Oral at bedtime  QUEtiapine 50 milliGRAM(s) Oral <User Schedule>  QUEtiapine 25 milliGRAM(s) Oral <User Schedule>  QUEtiapine 25 milliGRAM(s) Oral <User Schedule>    MEDICATIONS  (PRN):  acetaminophen     Tablet .. 650 milliGRAM(s) Oral every 6 hours PRN Temp greater or equal to 38C (100.4F), Mild Pain (1 - 3)  artificial tears (preservative free) Ophthalmic Solution 1 Drop(s) Both EYES three times a day PRN Dry Eyes  OLANZapine Injectable 1.25 milliGRAM(s) IntraMuscular every 6 hours PRN agitation      Care Discussed with Consultants/Other Providers [x] YES  [ ] NO    Vital Signs Last 24 Hrs  T(C): 36.4 (27 Feb 2024 05:10), Max: 36.4 (27 Feb 2024 05:10)  T(F): 97.6 (27 Feb 2024 05:10), Max: 97.6 (27 Feb 2024 05:10)  HR: 60 (27 Feb 2024 05:10) (58 - 60)  BP: 141/63 (27 Feb 2024 05:10) (101/50 - 141/63)  BP(mean): --  RR: 18 (27 Feb 2024 05:10) (18 - 18)  SpO2: 98% (27 Feb 2024 05:10) (98% - 98%)    Parameters below as of 27 Feb 2024 05:10  Patient On (Oxygen Delivery Method): room air      I&O's Summary      PHYSICAL EXAM:  GENERAL: NAD, elderly, comfortable on room air.   HEAD:  Atraumatic, Normocephalic  EYES: EOMI, PERRLA, conjunctiva and sclera clear  NECK: Supple, No JVD  CHEST/LUNG: mild decrease breath sounds bilaterally; No wheeze   HEART: Regular rate and rhythm; No murmurs, rubs, or gallops  ABDOMEN: Soft, Nontender, Nondistended; Bowel sounds present  Neuro: awake alert, calm. though confused. poor historian. no focal weakness   EXTREMITIES:  2+ Peripheral Pulses, No clubbing, cyanosis, or edema  SKIN: No rashes or lesions

## 2024-02-28 RX ADMIN — QUETIAPINE FUMARATE 25 MILLIGRAM(S): 200 TABLET, FILM COATED ORAL at 12:41

## 2024-02-28 RX ADMIN — QUETIAPINE FUMARATE 25 MILLIGRAM(S): 200 TABLET, FILM COATED ORAL at 05:46

## 2024-02-28 RX ADMIN — QUETIAPINE FUMARATE 50 MILLIGRAM(S): 200 TABLET, FILM COATED ORAL at 21:35

## 2024-02-28 RX ADMIN — Medication 3 MILLIGRAM(S): at 21:25

## 2024-02-28 NOTE — PROGRESS NOTE ADULT - SUBJECTIVE AND OBJECTIVE BOX
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   no cp, no sob, no n/v/d. no abdominal pain.  no headache, no dizziness.       --------------------------------------------------------------------------------------------  LABS:            CAPILLARY BLOOD GLUCOSE                RADIOLOGY & ADDITIONAL TESTS:    Imaging Personally Reviewed:  [x] YES  [ ] NO    Consultant(s) Notes Reviewed:  [x] YES  [ ] NO    MEDICATIONS  (STANDING):  melatonin 3 milliGRAM(s) Oral at bedtime  QUEtiapine 25 milliGRAM(s) Oral <User Schedule>  QUEtiapine 50 milliGRAM(s) Oral <User Schedule>  QUEtiapine 25 milliGRAM(s) Oral <User Schedule>    MEDICATIONS  (PRN):  acetaminophen     Tablet .. 650 milliGRAM(s) Oral every 6 hours PRN Temp greater or equal to 38C (100.4F), Mild Pain (1 - 3)  artificial tears (preservative free) Ophthalmic Solution 1 Drop(s) Both EYES three times a day PRN Dry Eyes  OLANZapine Injectable 1.25 milliGRAM(s) IntraMuscular every 6 hours PRN agitation      Care Discussed with Consultants/Other Providers [x] YES  [ ] NO    Vital Signs Last 24 Hrs  T(C): 36.6 (28 Feb 2024 13:00), Max: 36.8 (28 Feb 2024 04:30)  T(F): 97.8 (28 Feb 2024 13:00), Max: 98.2 (28 Feb 2024 04:30)  HR: 68 (28 Feb 2024 13:00) (67 - 72)  BP: 132/59 (28 Feb 2024 13:00) (110/52 - 136/66)  BP(mean): --  RR: 17 (28 Feb 2024 13:00) (17 - 18)  SpO2: 100% (28 Feb 2024 13:00) (99% - 100%)    Parameters below as of 28 Feb 2024 13:00  Patient On (Oxygen Delivery Method): room air      I&O's Summary    27 Feb 2024 07:01  -  28 Feb 2024 07:00  --------------------------------------------------------  IN: 0 mL / OUT: 0 mL / NET: 0 mL          PHYSICAL EXAM:  GENERAL: NAD, thin elderly, comfortable on room air  HEAD:  Atraumatic, Normocephalic  EYES: EOMI, PERRLA, conjunctiva and sclera clear  NECK: Supple, No JVD  CHEST/LUNG: mild decrease breath sounds bilaterally; No wheeze   HEART: Regular rate and rhythm; No murmurs, rubs, or gallops  ABDOMEN: Soft, Nontender, Nondistended; Bowel sounds present  Neuro: awake alert, calm. though confused. poor historian. no focal weakness   EXTREMITIES:  2+ Peripheral Pulses, No clubbing, cyanosis, or edema  SKIN: No rashes or lesions

## 2024-02-29 RX ADMIN — QUETIAPINE FUMARATE 50 MILLIGRAM(S): 200 TABLET, FILM COATED ORAL at 21:06

## 2024-02-29 RX ADMIN — Medication 3 MILLIGRAM(S): at 21:06

## 2024-02-29 RX ADMIN — QUETIAPINE FUMARATE 25 MILLIGRAM(S): 200 TABLET, FILM COATED ORAL at 05:45

## 2024-02-29 RX ADMIN — QUETIAPINE FUMARATE 25 MILLIGRAM(S): 200 TABLET, FILM COATED ORAL at 14:45

## 2024-02-29 NOTE — PROGRESS NOTE ADULT - SUBJECTIVE AND OBJECTIVE BOX
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 Christensen ---   No events.  Feel well.  no complaints.   no cp, no sob, no n/v/d.  no abd pain.   poor historian   confuse  poor insight.         --------------------------------------------------------------------------------------------  LABS:            CAPILLARY BLOOD GLUCOSE                RADIOLOGY & ADDITIONAL TESTS:    Imaging Personally Reviewed:  [x] YES  [ ] NO    Consultant(s) Notes Reviewed:  [x] YES  [ ] NO    MEDICATIONS  (STANDING):  melatonin 3 milliGRAM(s) Oral at bedtime  QUEtiapine 25 milliGRAM(s) Oral <User Schedule>  QUEtiapine 25 milliGRAM(s) Oral <User Schedule>  QUEtiapine 50 milliGRAM(s) Oral <User Schedule>    MEDICATIONS  (PRN):  acetaminophen     Tablet .. 650 milliGRAM(s) Oral every 6 hours PRN Temp greater or equal to 38C (100.4F), Mild Pain (1 - 3)  artificial tears (preservative free) Ophthalmic Solution 1 Drop(s) Both EYES three times a day PRN Dry Eyes  OLANZapine Injectable 1.25 milliGRAM(s) IntraMuscular every 6 hours PRN agitation      Care Discussed with Consultants/Other Providers [x] YES  [ ] NO    Vital Signs Last 24 Hrs  T(C): 36.4 (29 Feb 2024 06:00), Max: 36.4 (29 Feb 2024 06:00)  T(F): 97.6 (29 Feb 2024 06:00), Max: 97.6 (29 Feb 2024 06:00)  HR: 60 (29 Feb 2024 06:00) (60 - 60)  BP: 143/62 (29 Feb 2024 06:00) (143/62 - 143/62)  BP(mean): --  RR: 18 (29 Feb 2024 06:00) (18 - 18)  SpO2: 100% (29 Feb 2024 06:00) (100% - 100%)    Parameters below as of 29 Feb 2024 06:00  Patient On (Oxygen Delivery Method): room air      I&O's Summary        PHYSICAL EXAM:  GENERAL: NAD, thin elderly, comfortable on room air  HEAD:  Atraumatic, Normocephalic  EYES: EOMI, PERRLA, conjunctiva and sclera clear  NECK: Supple, No JVD  CHEST/LUNG: mild decrease breath sounds bilaterally; No wheeze   HEART: Regular rate and rhythm; No murmurs, rubs, or gallops  ABDOMEN: Soft, Nontender, Nondistended; Bowel sounds present  Neuro: awake alert, calm. though confused. poor historian. no focal weakness   EXTREMITIES:  2+ Peripheral Pulses, No clubbing, cyanosis, or edema  SKIN: No rashes or lesions

## 2024-02-29 NOTE — CHART NOTE - NSCHARTNOTEFT_GEN_A_CORE
NUTRITION FOLLOW-UP:    82 y/o F with PMH of possible dementia p/w confusion. and found to have UTI.    Pt f/u per protocol, remains confused, consuming 50-75% meals and [employer] shakes twice per day as per RN. No GI distress (nausea/vomiting/diarrhea/constipation.) at present. Noted skin ecchymosis, +1 edema Lt & Rt ankle per nursing flow sheet. Noted wt gain of 12# in past one month likely fluid related fluctuations along with improved po intake of meals and supplements.     Weight: 124.5# (2/28/24), 112.8# (1/24/24), 115# (1/17/24), 113.3# (1/1/24)    Labs:  No updated wts.    Current Diet: Diet, Regular (12-29-23 @ 22:08) [Active]    Skin- ecchymosis, +1 edema Lt & Rt ankle as per RN flow sheet    MEDICATIONS  (STANDING):  melatonin 3 milliGRAM(s) Oral at bedtime  QUEtiapine 25 milliGRAM(s) Oral <User Schedule>  QUEtiapine 50 milliGRAM(s) Oral <User Schedule>  QUEtiapine 25 milliGRAM(s) Oral <User Schedule>    Estimated needs:  No changes since previous assessment    Nutrition Diagnosis:  Ongoing- malnutrition    RD to Remain Available:    Additional Recommendations:   Continue with current diet and shakes as ordered.  Monitor PO intake, weight trends, nutrition related lab values, BMs/GI distress, hydration status, skin integrity.       Kae Browne RDN #[id number]

## 2024-03-01 RX ADMIN — QUETIAPINE FUMARATE 25 MILLIGRAM(S): 200 TABLET, FILM COATED ORAL at 05:21

## 2024-03-01 RX ADMIN — Medication 3 MILLIGRAM(S): at 21:10

## 2024-03-01 RX ADMIN — QUETIAPINE FUMARATE 50 MILLIGRAM(S): 200 TABLET, FILM COATED ORAL at 21:10

## 2024-03-01 RX ADMIN — QUETIAPINE FUMARATE 25 MILLIGRAM(S): 200 TABLET, FILM COATED ORAL at 12:58

## 2024-03-01 NOTE — PROGRESS NOTE ADULT - SUBJECTIVE AND OBJECTIVE BOX
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 Christensen ---   went to court for the guardianship this am.  Pt has no recollection of the event.  she is ambulating around on the [place].  no cp, no sob, no n/v/d. no abdominal pain.  no headache, no dizziness.   poor historian.         --------------------------------------------------------------------------------------------  LABS:            CAPILLARY BLOOD GLUCOSE                RADIOLOGY & ADDITIONAL TESTS:    Imaging Personally Reviewed:  [x] YES  [ ] NO    Consultant(s) Notes Reviewed:  [x] YES  [ ] NO    MEDICATIONS  (STANDING):  melatonin 3 milliGRAM(s) Oral at bedtime  QUEtiapine 50 milliGRAM(s) Oral <User Schedule>  QUEtiapine 25 milliGRAM(s) Oral <User Schedule>  QUEtiapine 25 milliGRAM(s) Oral <User Schedule>    MEDICATIONS  (PRN):  acetaminophen     Tablet .. 650 milliGRAM(s) Oral every 6 hours PRN Temp greater or equal to 38C (100.4F), Mild Pain (1 - 3)  artificial tears (preservative free) Ophthalmic Solution 1 Drop(s) Both EYES three times a day PRN Dry Eyes  OLANZapine Injectable 1.25 milliGRAM(s) IntraMuscular every 6 hours PRN agitation      Care Discussed with Consultants/Other Providers [x] YES  [ ] NO    Vital Signs Last 24 Hrs  T(C): 36.7 (01 Mar 2024 19:55), Max: 36.8 (01 Mar 2024 05:15)  T(F): 98.1 (01 Mar 2024 19:55), Max: 98.3 (01 Mar 2024 05:15)  HR: 71 (01 Mar 2024 19:55) (61 - 78)  BP: 111/82 (01 Mar 2024 19:55) (104/64 - 141/57)  BP(mean): --  RR: 18 (01 Mar 2024 19:55) (18 - 18)  SpO2: 100% (01 Mar 2024 19:55) (100% - 100%)    Parameters below as of 01 Mar 2024 19:55  Patient On (Oxygen Delivery Method): room air      I&O's Summary          PHYSICAL EXAM:  GENERAL: NAD, thin elderly, comfortable on room air  HEAD:  Atraumatic, Normocephalic  EYES: EOMI, PERRLA, conjunctiva and sclera clear  NECK: Supple, No JVD  CHEST/LUNG: mild decrease breath sounds bilaterally; No wheeze   HEART: Regular rate and rhythm; No murmurs, rubs, or gallops  ABDOMEN: Soft, Nontender, Nondistended; Bowel sounds present  Neuro: awake alert, calm. though confused. poor historian. no focal weakness   EXTREMITIES:  2+ Peripheral Pulses, No clubbing, cyanosis, or edema  SKIN: No rashes or lesions

## 2024-03-02 RX ADMIN — QUETIAPINE FUMARATE 25 MILLIGRAM(S): 200 TABLET, FILM COATED ORAL at 13:04

## 2024-03-02 RX ADMIN — OLANZAPINE 1.25 MILLIGRAM(S): 15 TABLET, FILM COATED ORAL at 03:17

## 2024-03-02 RX ADMIN — Medication 3 MILLIGRAM(S): at 21:00

## 2024-03-02 RX ADMIN — QUETIAPINE FUMARATE 50 MILLIGRAM(S): 200 TABLET, FILM COATED ORAL at 21:00

## 2024-03-02 RX ADMIN — QUETIAPINE FUMARATE 25 MILLIGRAM(S): 200 TABLET, FILM COATED ORAL at 05:42

## 2024-03-02 NOTE — PROGRESS NOTE ADULT - SUBJECTIVE AND OBJECTIVE BOX
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 Christensen ---   feels okay  remain calm  no cp, no sob, no n/v/d. no abdominal pain.  no headache, no dizziness.         --------------------------------------------------------------------------------------------  LABS:            CAPILLARY BLOOD GLUCOSE                RADIOLOGY & ADDITIONAL TESTS:    Imaging Personally Reviewed:  [x] YES  [ ] NO    Consultant(s) Notes Reviewed:  [x] YES  [ ] NO    MEDICATIONS  (STANDING):  melatonin 3 milliGRAM(s) Oral at bedtime  QUEtiapine 25 milliGRAM(s) Oral <User Schedule>  QUEtiapine 25 milliGRAM(s) Oral <User Schedule>  QUEtiapine 50 milliGRAM(s) Oral <User Schedule>    MEDICATIONS  (PRN):  acetaminophen     Tablet .. 650 milliGRAM(s) Oral every 6 hours PRN Temp greater or equal to 38C (100.4F), Mild Pain (1 - 3)  artificial tears (preservative free) Ophthalmic Solution 1 Drop(s) Both EYES three times a day PRN Dry Eyes  OLANZapine Injectable 1.25 milliGRAM(s) IntraMuscular every 6 hours PRN agitation      Care Discussed with Consultants/Other Providers [x] YES  [ ] NO    Vital Signs Last 24 Hrs  T(C): 36.6 (02 Mar 2024 12:10), Max: 36.6 (02 Mar 2024 05:30)  T(F): 97.8 (02 Mar 2024 12:10), Max: 97.8 (02 Mar 2024 05:30)  HR: 60 (02 Mar 2024 12:10) (58 - 60)  BP: 112/55 (02 Mar 2024 12:10) (112/55 - 120/52)  BP(mean): --  RR: 18 (02 Mar 2024 12:10) (18 - 18)  SpO2: 100% (02 Mar 2024 12:10) (100% - 100%)    Parameters below as of 02 Mar 2024 12:10  Patient On (Oxygen Delivery Method): room air      I&O's Summary      PHYSICAL EXAM:  GENERAL: NAD, thin elderly, comfortable on room air  HEAD:  Atraumatic, Normocephalic  EYES: EOMI, PERRLA, conjunctiva and sclera clear  NECK: Supple, No JVD  CHEST/LUNG: mild decrease breath sounds bilaterally; No wheeze   HEART: Regular rate and rhythm; No murmurs, rubs, or gallops  ABDOMEN: Soft, Nontender, Nondistended; Bowel sounds present  Neuro: awake alert, calm. though confused. poor historian. no focal weakness   EXTREMITIES:  2+ Peripheral Pulses, No clubbing, cyanosis, or edema  SKIN: No rashes or lesions

## 2024-03-03 RX ADMIN — QUETIAPINE FUMARATE 25 MILLIGRAM(S): 200 TABLET, FILM COATED ORAL at 05:03

## 2024-03-03 RX ADMIN — QUETIAPINE FUMARATE 50 MILLIGRAM(S): 200 TABLET, FILM COATED ORAL at 21:26

## 2024-03-03 RX ADMIN — QUETIAPINE FUMARATE 25 MILLIGRAM(S): 200 TABLET, FILM COATED ORAL at 13:31

## 2024-03-03 RX ADMIN — Medication 3 MILLIGRAM(S): at 21:26

## 2024-03-03 NOTE — PROGRESS NOTE ADULT - SUBJECTIVE AND OBJECTIVE BOX
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 Christensen ---   calm. forgetful. poor historian  pt's friend at bedside  no cp, no sob, no n/v/d. no abdominal pain.  no headache, no dizziness.         --------------------------------------------------------------------------------------------  LABS:            CAPILLARY BLOOD GLUCOSE                RADIOLOGY & ADDITIONAL TESTS:    Imaging Personally Reviewed:  [x] YES  [ ] NO    Consultant(s) Notes Reviewed:  [x] YES  [ ] NO    MEDICATIONS  (STANDING):  melatonin 3 milliGRAM(s) Oral at bedtime  QUEtiapine 25 milliGRAM(s) Oral <User Schedule>  QUEtiapine 25 milliGRAM(s) Oral <User Schedule>  QUEtiapine 50 milliGRAM(s) Oral <User Schedule>    MEDICATIONS  (PRN):  acetaminophen     Tablet .. 650 milliGRAM(s) Oral every 6 hours PRN Temp greater or equal to 38C (100.4F), Mild Pain (1 - 3)  artificial tears (preservative free) Ophthalmic Solution 1 Drop(s) Both EYES three times a day PRN Dry Eyes  OLANZapine Injectable 1.25 milliGRAM(s) IntraMuscular every 6 hours PRN agitation      Care Discussed with Consultants/Other Providers [x] YES  [ ] NO    Vital Signs Last 24 Hrs  T(C): 36.8 (03 Mar 2024 21:00), Max: 36.8 (03 Mar 2024 21:00)  T(F): 98.3 (03 Mar 2024 21:00), Max: 98.3 (03 Mar 2024 21:00)  HR: 66 (03 Mar 2024 21:00) (66 - 90)  BP: 102/82 (03 Mar 2024 21:00) (102/82 - 108/96)  BP(mean): --  RR: 18 (03 Mar 2024 21:00) (18 - 18)  SpO2: 99% (03 Mar 2024 21:00) (91% - 99%)    Parameters below as of 03 Mar 2024 21:00  Patient On (Oxygen Delivery Method): room air      I&O's Summary            PHYSICAL EXAM:  GENERAL: NAD, thin elderly, comfortable on room air  HEAD:  Atraumatic, Normocephalic  EYES: EOMI, PERRLA, conjunctiva and sclera clear  NECK: Supple, No JVD  CHEST/LUNG: mild decrease breath sounds bilaterally; No wheeze   HEART: Regular rate and rhythm; No murmurs, rubs, or gallops  ABDOMEN: Soft, Nontender, Nondistended; Bowel sounds present  Neuro: awake alert, calm. though confused. poor historian. no focal weakness   EXTREMITIES:  2+ Peripheral Pulses, No clubbing, cyanosis, or edema  SKIN: No rashes or lesions

## 2024-03-04 RX ADMIN — QUETIAPINE FUMARATE 25 MILLIGRAM(S): 200 TABLET, FILM COATED ORAL at 06:02

## 2024-03-04 RX ADMIN — QUETIAPINE FUMARATE 50 MILLIGRAM(S): 200 TABLET, FILM COATED ORAL at 20:47

## 2024-03-04 RX ADMIN — QUETIAPINE FUMARATE 25 MILLIGRAM(S): 200 TABLET, FILM COATED ORAL at 12:46

## 2024-03-04 NOTE — PROGRESS NOTE ADULT - SUBJECTIVE AND OBJECTIVE BOX
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 Christensen ---   No overnight events.  No cp/sob/n/v/d.  No HA/dizziness.  No abdominal pain.     --------------------------------------------------------------------------------------------  LABS:            CAPILLARY BLOOD GLUCOSE                RADIOLOGY & ADDITIONAL TESTS:    Imaging Personally Reviewed:  [x] YES  [ ] NO    Consultant(s) Notes Reviewed:  [x] YES  [ ] NO    MEDICATIONS  (STANDING):  melatonin 3 milliGRAM(s) Oral at bedtime  QUEtiapine 50 milliGRAM(s) Oral <User Schedule>  QUEtiapine 25 milliGRAM(s) Oral <User Schedule>  QUEtiapine 25 milliGRAM(s) Oral <User Schedule>    MEDICATIONS  (PRN):  acetaminophen     Tablet .. 650 milliGRAM(s) Oral every 6 hours PRN Temp greater or equal to 38C (100.4F), Mild Pain (1 - 3)  artificial tears (preservative free) Ophthalmic Solution 1 Drop(s) Both EYES three times a day PRN Dry Eyes  OLANZapine Injectable 1.25 milliGRAM(s) IntraMuscular every 6 hours PRN agitation      Care Discussed with Consultants/Other Providers [x] YES  [ ] NO    Vital Signs Last 24 Hrs  T(C): 37.1 (04 Mar 2024 19:35), Max: 37.1 (04 Mar 2024 19:35)  T(F): 98.8 (04 Mar 2024 19:35), Max: 98.8 (04 Mar 2024 19:35)  HR: 81 (04 Mar 2024 19:35) (66 - 81)  BP: 113/65 (04 Mar 2024 19:35) (102/82 - 151/60)  BP(mean): --  RR: 18 (04 Mar 2024 19:35) (18 - 18)  SpO2: 100% (04 Mar 2024 19:35) (99% - 100%)    Parameters below as of 04 Mar 2024 19:35  Patient On (Oxygen Delivery Method): room air      I&O's Summary            PHYSICAL EXAM:  GENERAL: NAD, thin elderly, comfortable on room air  HEAD:  Atraumatic, Normocephalic  EYES: EOMI, PERRLA, conjunctiva and sclera clear  NECK: Supple, No JVD  CHEST/LUNG: mild decrease breath sounds bilaterally; No wheeze   HEART: Regular rate and rhythm; No murmurs, rubs, or gallops  ABDOMEN: Soft, Nontender, Nondistended; Bowel sounds present  Neuro: awake alert, calm. though confused. poor historian. no focal weakness   EXTREMITIES:  2+ Peripheral Pulses, No clubbing, cyanosis, or edema  SKIN: No rashes or lesions

## 2024-03-05 RX ADMIN — Medication 3 MILLIGRAM(S): at 21:06

## 2024-03-05 RX ADMIN — QUETIAPINE FUMARATE 50 MILLIGRAM(S): 200 TABLET, FILM COATED ORAL at 20:31

## 2024-03-05 RX ADMIN — QUETIAPINE FUMARATE 25 MILLIGRAM(S): 200 TABLET, FILM COATED ORAL at 13:02

## 2024-03-05 NOTE — PROGRESS NOTE ADULT - SUBJECTIVE AND OBJECTIVE BOX
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 Christensen ---   ambulating around   offers no complaints.  denied pain.  Pt denied SI/HI ideations, denied visual and auditory hallucinations.   poor historian.     --------------------------------------------------------------------------------------------  LABS:            CAPILLARY BLOOD GLUCOSE                RADIOLOGY & ADDITIONAL TESTS:    Imaging Personally Reviewed:  [x] YES  [ ] NO    Consultant(s) Notes Reviewed:  [x] YES  [ ] NO    MEDICATIONS  (STANDING):  melatonin 3 milliGRAM(s) Oral at bedtime  QUEtiapine 50 milliGRAM(s) Oral <User Schedule>  QUEtiapine 25 milliGRAM(s) Oral <User Schedule>  QUEtiapine 25 milliGRAM(s) Oral <User Schedule>    MEDICATIONS  (PRN):  acetaminophen     Tablet .. 650 milliGRAM(s) Oral every 6 hours PRN Temp greater or equal to 38C (100.4F), Mild Pain (1 - 3)  artificial tears (preservative free) Ophthalmic Solution 1 Drop(s) Both EYES three times a day PRN Dry Eyes  OLANZapine Injectable 1.25 milliGRAM(s) IntraMuscular every 6 hours PRN agitation      Care Discussed with Consultants/Other Providers [x] YES  [ ] NO    Vital Signs Last 24 Hrs  T(C): 36.4 (05 Mar 2024 21:54), Max: 36.4 (05 Mar 2024 14:39)  T(F): 97.5 (05 Mar 2024 21:54), Max: 97.6 (05 Mar 2024 14:39)  HR: 65 (05 Mar 2024 21:54) (65 - 78)  BP: 143/59 (05 Mar 2024 21:54) (121/67 - 143/59)  BP(mean): --  RR: 18 (05 Mar 2024 21:54) (17 - 18)  SpO2: 100% (05 Mar 2024 21:54) (97% - 100%)    Parameters below as of 05 Mar 2024 21:54  Patient On (Oxygen Delivery Method): room air      I&O's Summary      PHYSICAL EXAM:  GENERAL: NAD, thin elderly, comfortable on room air  HEAD:  Atraumatic, Normocephalic  EYES: EOMI, PERRLA, conjunctiva and sclera clear  NECK: Supple, No JVD  CHEST/LUNG: mild decrease breath sounds bilaterally; No wheeze   HEART: Regular rate and rhythm; No murmurs, rubs, or gallops  ABDOMEN: Soft, Nontender, Nondistended; Bowel sounds present  Neuro: awake alert, calm. though confused. poor historian. no focal weakness   EXTREMITIES:  2+ Peripheral Pulses, No clubbing, cyanosis, or edema  SKIN: No rashes or lesions

## 2024-03-06 RX ADMIN — Medication 3 MILLIGRAM(S): at 21:18

## 2024-03-06 RX ADMIN — OLANZAPINE 1.25 MILLIGRAM(S): 15 TABLET, FILM COATED ORAL at 23:05

## 2024-03-06 RX ADMIN — QUETIAPINE FUMARATE 50 MILLIGRAM(S): 200 TABLET, FILM COATED ORAL at 20:13

## 2024-03-06 RX ADMIN — QUETIAPINE FUMARATE 25 MILLIGRAM(S): 200 TABLET, FILM COATED ORAL at 06:14

## 2024-03-06 RX ADMIN — QUETIAPINE FUMARATE 25 MILLIGRAM(S): 200 TABLET, FILM COATED ORAL at 12:56

## 2024-03-06 NOTE — PROGRESS NOTE ADULT - SUBJECTIVE AND OBJECTIVE BOX
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 Christensen ---   stable overall.  awake alert. comfortable.  No cp, no sob. No abd pain.  denied pain.  no n/v/d.   tolerating diet.   ambulating around.         --------------------------------------------------------------------------------------------  LABS:            CAPILLARY BLOOD GLUCOSE                RADIOLOGY & ADDITIONAL TESTS:    Imaging Personally Reviewed:  [x] YES  [ ] NO    Consultant(s) Notes Reviewed:  [x] YES  [ ] NO    MEDICATIONS  (STANDING):  melatonin 3 milliGRAM(s) Oral at bedtime  QUEtiapine 50 milliGRAM(s) Oral <User Schedule>  QUEtiapine 25 milliGRAM(s) Oral <User Schedule>  QUEtiapine 25 milliGRAM(s) Oral <User Schedule>    MEDICATIONS  (PRN):  acetaminophen     Tablet .. 650 milliGRAM(s) Oral every 6 hours PRN Temp greater or equal to 38C (100.4F), Mild Pain (1 - 3)  artificial tears (preservative free) Ophthalmic Solution 1 Drop(s) Both EYES three times a day PRN Dry Eyes  OLANZapine Injectable 1.25 milliGRAM(s) IntraMuscular every 6 hours PRN agitation      Care Discussed with Consultants/Other Providers [x] YES  [ ] NO    Vital Signs Last 24 Hrs  T(C): 36.5 (06 Mar 2024 20:10), Max: 36.7 (06 Mar 2024 12:20)  T(F): 97.7 (06 Mar 2024 20:10), Max: 98.1 (06 Mar 2024 12:20)  HR: 64 (06 Mar 2024 20:10) (64 - 67)  BP: 142/53 (06 Mar 2024 20:10) (142/53 - 158/71)  BP(mean): --  RR: 18 (06 Mar 2024 20:10) (18 - 18)  SpO2: 100% (06 Mar 2024 20:10) (100% - 100%)    Parameters below as of 06 Mar 2024 20:10  Patient On (Oxygen Delivery Method): room air      I&O's Summary          PHYSICAL EXAM:  GENERAL: NAD, thin elderly, comfortable on room air  HEAD:  Atraumatic, Normocephalic  EYES: EOMI, PERRLA, conjunctiva and sclera clear  NECK: Supple, No JVD  CHEST/LUNG: mild decrease breath sounds bilaterally; No wheeze   HEART: Regular rate and rhythm; No murmurs, rubs, or gallops  ABDOMEN: Soft, Nontender, Nondistended; Bowel sounds present  Neuro: awake alert, calm. though confused. poor historian. no focal weakness   EXTREMITIES:  2+ Peripheral Pulses, No clubbing, cyanosis, trace b/l edema  SKIN: No rashes or lesions

## 2024-03-06 NOTE — PROGRESS NOTE ADULT - NSPROGADDITIONALINFOA_GEN_ALL_CORE
CM follow up.  went to court for the guardianship 3/1/24.     dc planning in progress.     --- Coverage for Dr. Christensen ---   - Dr. SEVERINO [facility]   - [phone]
--- Coverage for Dr. Christensen ---   (She will be back on-service 3/7/24)   - Dr. SEVERINO [facility]   - [phone]
CM follow up.  went to court for the guardianship 3/1/24.     dc planning in progress.   d/w pt and pt's friend who states that pt has no family here in [place].     --- Coverage for Dr. Christensen ---   - Dr. SEVERINO [facility]   - [phone]
d/w ADAM Garnett.  CM follow up.    --- Coverage for Dr. Christensen ---   - Dr. SEVERINO [facility]   - [phone]
CM follow up.  Guardianship court on 3/1 at 10am.    --- Coverage for Dr. Christensen ---   - Dr. MERE Eng   - [phone]
CM follow up.  went to court for the guardianship 3/1/24.     --- Coverage for Dr. Christensen ---   - Dr. SEVERINO [facility]   - [phone]
CM follow up.  d/w RN. remain calm.     --- Coverage for Dr. Christensen ---   - Dr. SEVERINO [facility]   - [phone]
CM follow up.  went to court for the guardianship 3/1/24.     dc planning in progress.   d/w pt and pt's friend who states that pt has no family here in [place].     --- Coverage for Dr. Christensen ---   - Dr. SEVERINO [facility]   - [phone]
CM follow up.  went to court for the guardianship 3/1/24.     dc planning in progress.   d/w pt and pt's friend who states that pt has no family here in [place].     --- Coverage for Dr. Christensen ---   - Dr. SEVERINO [facility]   - [phone]

## 2024-03-07 RX ORDER — LANOLIN ALCOHOL/MO/W.PET/CERES
3 CREAM (GRAM) TOPICAL AT BEDTIME
Refills: 0 | Status: DISCONTINUED | OUTPATIENT
Start: 2024-03-07 | End: 2024-03-27

## 2024-03-07 RX ORDER — OLANZAPINE 15 MG/1
1.25 TABLET, FILM COATED ORAL EVERY 6 HOURS
Refills: 0 | Status: DISCONTINUED | OUTPATIENT
Start: 2024-03-07 | End: 2024-04-12

## 2024-03-07 RX ORDER — QUETIAPINE FUMARATE 200 MG/1
25 TABLET, FILM COATED ORAL
Refills: 0 | Status: DISCONTINUED | OUTPATIENT
Start: 2024-03-07 | End: 2024-04-08

## 2024-03-07 RX ORDER — ACETAMINOPHEN 500 MG
650 TABLET ORAL EVERY 6 HOURS
Refills: 0 | Status: DISCONTINUED | OUTPATIENT
Start: 2024-03-07 | End: 2024-04-12

## 2024-03-07 RX ORDER — QUETIAPINE FUMARATE 200 MG/1
50 TABLET, FILM COATED ORAL
Refills: 0 | Status: DISCONTINUED | OUTPATIENT
Start: 2024-03-07 | End: 2024-03-27

## 2024-03-07 RX ADMIN — Medication 3 MILLIGRAM(S): at 21:55

## 2024-03-07 RX ADMIN — QUETIAPINE FUMARATE 25 MILLIGRAM(S): 200 TABLET, FILM COATED ORAL at 05:31

## 2024-03-07 RX ADMIN — QUETIAPINE FUMARATE 50 MILLIGRAM(S): 200 TABLET, FILM COATED ORAL at 20:31

## 2024-03-07 RX ADMIN — QUETIAPINE FUMARATE 25 MILLIGRAM(S): 200 TABLET, FILM COATED ORAL at 13:27

## 2024-03-07 NOTE — PROGRESS NOTE ADULT - SUBJECTIVE AND OBJECTIVE BOX
pt seen and examined  3/7/24    MEDICATIONS  (STANDING):  melatonin 3 milliGRAM(s) Oral at bedtime  QUEtiapine 50 milliGRAM(s) Oral <User Schedule>  QUEtiapine 25 milliGRAM(s) Oral <User Schedule>  QUEtiapine 25 milliGRAM(s) Oral <User Schedule>    MEDICATIONS  (PRN):  acetaminophen     Tablet .. 650 milliGRAM(s) Oral every 6 hours PRN Temp greater or equal to 38C (100.4F), Mild Pain (1 - 3)  artificial tears (preservative free) Ophthalmic Solution 1 Drop(s) Both EYES three times a day PRN Dry Eyes  OLANZapine Injectable 1.25 milliGRAM(s) IntraMuscular every 6 hours PRN agitation    Vital Signs Last 24 Hrs  T(C): 36.8 (03-07-24 @ 20:15), Max: 36.8 (03-07-24 @ 11:10)  T(F): 98.2 (03-07-24 @ 20:15), Max: 98.2 (03-07-24 @ 11:10)  HR: 63 (03-07-24 @ 20:15) (59 - 63)  BP: 137/60 (03-07-24 @ 20:15) (126/54 - 137/60)  BP(mean): --  RR: 18 (03-07-24 @ 20:15) (18 - 18)  SpO2: 100% (03-07-24 @ 20:15) (100% - 100%)      LABS:        Creatinine Trend:     Urine Studies:                    PHYSICAL EXAM:  GENERAL: NAD, thin elderly, comfortable on room air  HEAD:  Atraumatic, Normocephalic  EYES: EOMI, PERRLA, conjunctiva and sclera clear  NECK: Supple, No JVD  CHEST/LUNG: mild decrease breath sounds bilaterally; No wheeze   HEART: Regular rate and rhythm; No murmurs, rubs, or gallops  ABDOMEN: Soft, Nontender, Nondistended; Bowel sounds present  Neuro: awake alert, calm. though confused. poor historian. no focal weakness   EXTREMITIES:  2+ Peripheral Pulses, No clubbing, cyanosis, trace b/l edema  SKIN: No rashes or lesions

## 2024-03-08 RX ADMIN — QUETIAPINE FUMARATE 25 MILLIGRAM(S): 200 TABLET, FILM COATED ORAL at 06:36

## 2024-03-08 RX ADMIN — Medication 3 MILLIGRAM(S): at 21:16

## 2024-03-08 RX ADMIN — QUETIAPINE FUMARATE 50 MILLIGRAM(S): 200 TABLET, FILM COATED ORAL at 19:29

## 2024-03-08 RX ADMIN — QUETIAPINE FUMARATE 25 MILLIGRAM(S): 200 TABLET, FILM COATED ORAL at 13:08

## 2024-03-08 NOTE — PROGRESS NOTE ADULT - SUBJECTIVE AND OBJECTIVE BOX
pt seen and examined  3/8/24    MEDICATIONS  (STANDING):  melatonin 3 milliGRAM(s) Oral at bedtime  QUEtiapine 50 milliGRAM(s) Oral <User Schedule>  QUEtiapine 25 milliGRAM(s) Oral <User Schedule>  QUEtiapine 25 milliGRAM(s) Oral <User Schedule>    MEDICATIONS  (PRN):  acetaminophen     Tablet .. 650 milliGRAM(s) Oral every 6 hours PRN Temp greater or equal to 38C (100.4F), Mild Pain (1 - 3)  artificial tears (preservative free) Ophthalmic Solution 1 Drop(s) Both EYES three times a day PRN Dry Eyes  OLANZapine Injectable 1.25 milliGRAM(s) IntraMuscular every 6 hours PRN agitation    Vital Signs Last 24 Hrs  T(C): 36.9 (03-08-24 @ 20:50), Max: 36.9 (03-08-24 @ 20:50)  T(F): 98.4 (03-08-24 @ 20:50), Max: 98.4 (03-08-24 @ 20:50)  HR: 66 (03-08-24 @ 20:50) (58 - 79)  BP: 114/50 (03-08-24 @ 20:50) (114/50 - 130/50)  BP(mean): --  RR: 18 (03-08-24 @ 20:50) (18 - 18)  SpO2: 100% (03-08-24 @ 20:50) (100% - 100%)      LABS:        Creatinine Trend:     Urine Studies:                                  PHYSICAL EXAM:  GENERAL: NAD, thin elderly, comfortable on room air  HEAD:  Atraumatic, Normocephalic  EYES: EOMI, PERRLA, conjunctiva and sclera clear  NECK: Supple, No JVD  CHEST/LUNG: mild decrease breath sounds bilaterally; No wheeze   HEART: Regular rate and rhythm; No murmurs, rubs, or gallops  ABDOMEN: Soft, Nontender, Nondistended; Bowel sounds present  Neuro: awake alert, calm. though confused. poor historian. no focal weakness   EXTREMITIES:  2+ Peripheral Pulses, No clubbing, cyanosis, trace b/l edema  SKIN: No rashes or lesions

## 2024-03-09 RX ADMIN — QUETIAPINE FUMARATE 25 MILLIGRAM(S): 200 TABLET, FILM COATED ORAL at 13:02

## 2024-03-09 RX ADMIN — QUETIAPINE FUMARATE 50 MILLIGRAM(S): 200 TABLET, FILM COATED ORAL at 19:16

## 2024-03-09 RX ADMIN — Medication 3 MILLIGRAM(S): at 21:12

## 2024-03-09 RX ADMIN — QUETIAPINE FUMARATE 25 MILLIGRAM(S): 200 TABLET, FILM COATED ORAL at 05:00

## 2024-03-09 NOTE — PROGRESS NOTE ADULT - SUBJECTIVE AND OBJECTIVE BOX
pt seen and examined  3/9/24    MEDICATIONS  (STANDING):  melatonin 3 milliGRAM(s) Oral at bedtime  QUEtiapine 50 milliGRAM(s) Oral <User Schedule>  QUEtiapine 25 milliGRAM(s) Oral <User Schedule>  QUEtiapine 25 milliGRAM(s) Oral <User Schedule>    MEDICATIONS  (PRN):  acetaminophen     Tablet .. 650 milliGRAM(s) Oral every 6 hours PRN Temp greater or equal to 38C (100.4F), Mild Pain (1 - 3)  artificial tears (preservative free) Ophthalmic Solution 1 Drop(s) Both EYES three times a day PRN Dry Eyes  OLANZapine Injectable 1.25 milliGRAM(s) IntraMuscular every 6 hours PRN agitation    Vital Signs Last 24 Hrs  T(C): 36.5 (03-09-24 @ 19:10), Max: 37.1 (03-09-24 @ 11:32)  T(F): 97.7 (03-09-24 @ 19:10), Max: 98.7 (03-09-24 @ 11:32)  HR: 64 (03-09-24 @ 19:10) (62 - 64)  BP: 155/56 (03-09-24 @ 19:10) (102/70 - 155/56)  BP(mean): --  RR: 18 (03-09-24 @ 19:10) (17 - 18)  SpO2: 100% (03-09-24 @ 19:10) (100% - 100%)      LABS:        Creatinine Trend:     Urine Studies:                                            PHYSICAL EXAM:  GENERAL: NAD, thin elderly, comfortable on room air  HEAD:  Atraumatic, Normocephalic  EYES: EOMI, PERRLA, conjunctiva and sclera clear  NECK: Supple, No JVD  CHEST/LUNG: mild decrease breath sounds bilaterally; No wheeze   HEART: Regular rate and rhythm; No murmurs, rubs, or gallops  ABDOMEN: Soft, Nontender, Nondistended; Bowel sounds present  Neuro: awake alert, calm. though confused. poor historian. no focal weakness   EXTREMITIES:  2+ Peripheral Pulses, No clubbing, cyanosis, trace b/l edema  SKIN: No rashes or lesions

## 2024-03-10 RX ADMIN — QUETIAPINE FUMARATE 25 MILLIGRAM(S): 200 TABLET, FILM COATED ORAL at 13:01

## 2024-03-10 RX ADMIN — QUETIAPINE FUMARATE 50 MILLIGRAM(S): 200 TABLET, FILM COATED ORAL at 19:03

## 2024-03-10 RX ADMIN — QUETIAPINE FUMARATE 25 MILLIGRAM(S): 200 TABLET, FILM COATED ORAL at 06:25

## 2024-03-10 RX ADMIN — Medication 3 MILLIGRAM(S): at 21:20

## 2024-03-10 NOTE — PROGRESS NOTE ADULT - SUBJECTIVE AND OBJECTIVE BOX
pt seen and examined  3/10/24    MEDICATIONS  (STANDING):  melatonin 3 milliGRAM(s) Oral at bedtime  QUEtiapine 50 milliGRAM(s) Oral <User Schedule>  QUEtiapine 25 milliGRAM(s) Oral <User Schedule>  QUEtiapine 25 milliGRAM(s) Oral <User Schedule>    MEDICATIONS  (PRN):  acetaminophen     Tablet .. 650 milliGRAM(s) Oral every 6 hours PRN Temp greater or equal to 38C (100.4F), Mild Pain (1 - 3)  artificial tears (preservative free) Ophthalmic Solution 1 Drop(s) Both EYES three times a day PRN Dry Eyes  OLANZapine Injectable 1.25 milliGRAM(s) IntraMuscular every 6 hours PRN agitation    Vital Signs Last 24 Hrs  T(C): 36.5 (03-09-24 @ 19:10), Max: 37.1 (03-09-24 @ 11:32)  T(F): 97.7 (03-09-24 @ 19:10), Max: 98.7 (03-09-24 @ 11:32)  HR: 64 (03-09-24 @ 19:10) (62 - 64)  BP: 155/56 (03-09-24 @ 19:10) (102/70 - 155/56)  BP(mean): --  RR: 18 (03-09-24 @ 19:10) (17 - 18)  SpO2: 100% (03-09-24 @ 19:10) (100% - 100%)      LABS:        Creatinine Trend:     Urine Studies:                                            PHYSICAL EXAM:  GENERAL: NAD, thin elderly, comfortable on room air  HEAD:  Atraumatic, Normocephalic  EYES: EOMI, PERRLA, conjunctiva and sclera clear  NECK: Supple, No JVD  CHEST/LUNG: mild decrease breath sounds bilaterally; No wheeze   HEART: Regular rate and rhythm; No murmurs, rubs, or gallops  ABDOMEN: Soft, Nontender, Nondistended; Bowel sounds present  Neuro: awake alert, calm. though confused. poor historian. no focal weakness   EXTREMITIES:  2+ Peripheral Pulses, No clubbing, cyanosis, trace b/l edema  SKIN: No rashes or lesions

## 2024-03-11 RX ADMIN — Medication 3 MILLIGRAM(S): at 21:05

## 2024-03-11 RX ADMIN — QUETIAPINE FUMARATE 25 MILLIGRAM(S): 200 TABLET, FILM COATED ORAL at 05:23

## 2024-03-11 RX ADMIN — OLANZAPINE 1.25 MILLIGRAM(S): 15 TABLET, FILM COATED ORAL at 22:10

## 2024-03-11 RX ADMIN — QUETIAPINE FUMARATE 25 MILLIGRAM(S): 200 TABLET, FILM COATED ORAL at 14:35

## 2024-03-11 RX ADMIN — QUETIAPINE FUMARATE 50 MILLIGRAM(S): 200 TABLET, FILM COATED ORAL at 21:05

## 2024-03-11 NOTE — ED PROVIDER NOTE - CHPI ED SYMPTOMS POS
Pt presents to the ED with c/o worsening SOB over the past 2 days. Pt hx of asthma. Tried inhaler and nebs without improvement. Denies known fevers or pain.         
CONFUSION

## 2024-03-11 NOTE — PROGRESS NOTE ADULT - SUBJECTIVE AND OBJECTIVE BOX
pt seen and examined  3/11/24    MEDICATIONS  (STANDING):  melatonin 3 milliGRAM(s) Oral at bedtime  QUEtiapine 50 milliGRAM(s) Oral <User Schedule>  QUEtiapine 25 milliGRAM(s) Oral <User Schedule>  QUEtiapine 25 milliGRAM(s) Oral <User Schedule>    MEDICATIONS  (PRN):  acetaminophen     Tablet .. 650 milliGRAM(s) Oral every 6 hours PRN Temp greater or equal to 38C (100.4F), Mild Pain (1 - 3)  artificial tears (preservative free) Ophthalmic Solution 1 Drop(s) Both EYES three times a day PRN Dry Eyes  OLANZapine Injectable 1.25 milliGRAM(s) IntraMuscular every 6 hours PRN agitation    Vital Signs Last 24 Hrs  T(C): 36.7 (03-12-24 @ 05:00), Max: 36.7 (03-11-24 @ 19:29)  T(F): 98 (03-12-24 @ 05:00), Max: 98 (03-11-24 @ 19:29)  HR: 60 (03-12-24 @ 05:00) (58 - 69)  BP: 125/60 (03-12-24 @ 05:00) (125/60 - 152/66)  BP(mean): --  RR: 18 (03-12-24 @ 05:00) (18 - 18)  SpO2: 100% (03-12-24 @ 05:00) (98% - 100%)          Creatinine Trend:     Urine Studies:                                            PHYSICAL EXAM:  GENERAL: NAD, thin elderly, comfortable on room air  HEAD:  Atraumatic, Normocephalic  EYES: EOMI, PERRLA, conjunctiva and sclera clear  NECK: Supple, No JVD  CHEST/LUNG: mild decrease breath sounds bilaterally; No wheeze   HEART: Regular rate and rhythm; No murmurs, rubs, or gallops  ABDOMEN: Soft, Nontender, Nondistended; Bowel sounds present  Neuro: awake alert, calm. though confused. poor historian. no focal weakness   EXTREMITIES:  2+ Peripheral Pulses, No clubbing, cyanosis, trace b/l edema  SKIN: No rashes or lesions

## 2024-03-12 RX ORDER — OLANZAPINE 15 MG/1
1.25 TABLET, FILM COATED ORAL ONCE
Refills: 0 | Status: COMPLETED | OUTPATIENT
Start: 2024-03-12 | End: 2024-03-12

## 2024-03-12 RX ADMIN — Medication 3 MILLIGRAM(S): at 22:07

## 2024-03-12 RX ADMIN — OLANZAPINE 1.25 MILLIGRAM(S): 15 TABLET, FILM COATED ORAL at 01:13

## 2024-03-12 RX ADMIN — QUETIAPINE FUMARATE 25 MILLIGRAM(S): 200 TABLET, FILM COATED ORAL at 04:14

## 2024-03-12 RX ADMIN — QUETIAPINE FUMARATE 50 MILLIGRAM(S): 200 TABLET, FILM COATED ORAL at 22:07

## 2024-03-12 RX ADMIN — QUETIAPINE FUMARATE 25 MILLIGRAM(S): 200 TABLET, FILM COATED ORAL at 15:01

## 2024-03-12 RX ADMIN — OLANZAPINE 1.25 MILLIGRAM(S): 15 TABLET, FILM COATED ORAL at 20:23

## 2024-03-12 NOTE — PROGRESS NOTE ADULT - SUBJECTIVE AND OBJECTIVE BOX
pt seen and examined  3/12/24    MEDICATIONS  (STANDING):  melatonin 3 milliGRAM(s) Oral at bedtime  QUEtiapine 25 milliGRAM(s) Oral <User Schedule>  QUEtiapine 25 milliGRAM(s) Oral <User Schedule>  QUEtiapine 50 milliGRAM(s) Oral <User Schedule>    MEDICATIONS  (PRN):  acetaminophen     Tablet .. 650 milliGRAM(s) Oral every 6 hours PRN Temp greater or equal to 38C (100.4F), Mild Pain (1 - 3)  artificial tears (preservative free) Ophthalmic Solution 1 Drop(s) Both EYES three times a day PRN Dry Eyes  OLANZapine Injectable 1.25 milliGRAM(s) IntraMuscular every 6 hours PRN agitation    Vital Signs Last 24 Hrs  T(C): 36.8 (03-12-24 @ 19:31), Max: 36.8 (03-12-24 @ 19:31)  T(F): 98.2 (03-12-24 @ 19:31), Max: 98.2 (03-12-24 @ 19:31)  HR: 66 (03-12-24 @ 19:31) (60 - 66)  BP: 149/58 (03-12-24 @ 19:31) (125/60 - 149/58)  BP(mean): --  RR: 18 (03-12-24 @ 19:31) (18 - 18)  SpO2: 100% (03-12-24 @ 19:31) (100% - 100%)      PHYSICAL EXAM:  GENERAL: NAD, thin elderly, comfortable on room air  HEAD:  Atraumatic, Normocephalic  EYES: EOMI, PERRLA, conjunctiva and sclera clear  NECK: Supple, No JVD  CHEST/LUNG:dec breath sounds at bases  HEART: Regular rate and rhythm; No murmurs, rubs, or gallops  ABDOMEN: Soft, Nontender, Nondistended; Bowel sounds present  Neuro: awake alert,  EXTREMITIES:  2+ Peripheral Pulses, No clubbing, cyanosis, trace b/l edema  SKIN: No rashes or lesions     LABS:        Creatinine Trend:     Urine Studies:

## 2024-03-13 RX ADMIN — QUETIAPINE FUMARATE 25 MILLIGRAM(S): 200 TABLET, FILM COATED ORAL at 05:42

## 2024-03-13 RX ADMIN — Medication 3 MILLIGRAM(S): at 22:30

## 2024-03-13 RX ADMIN — QUETIAPINE FUMARATE 50 MILLIGRAM(S): 200 TABLET, FILM COATED ORAL at 22:30

## 2024-03-13 RX ADMIN — OLANZAPINE 1.25 MILLIGRAM(S): 15 TABLET, FILM COATED ORAL at 18:56

## 2024-03-13 RX ADMIN — QUETIAPINE FUMARATE 25 MILLIGRAM(S): 200 TABLET, FILM COATED ORAL at 13:42

## 2024-03-13 NOTE — PROGRESS NOTE ADULT - SUBJECTIVE AND OBJECTIVE BOX
pt seen and examined  3/13/24    MEDICATIONS  (STANDING):  melatonin 3 milliGRAM(s) Oral at bedtime  QUEtiapine 25 milliGRAM(s) Oral <User Schedule>  QUEtiapine 25 milliGRAM(s) Oral <User Schedule>  QUEtiapine 50 milliGRAM(s) Oral <User Schedule>    MEDICATIONS  (PRN):  acetaminophen     Tablet .. 650 milliGRAM(s) Oral every 6 hours PRN Temp greater or equal to 38C (100.4F), Mild Pain (1 - 3)  artificial tears (preservative free) Ophthalmic Solution 1 Drop(s) Both EYES three times a day PRN Dry Eyes  OLANZapine Injectable 1.25 milliGRAM(s) IntraMuscular every 6 hours PRN agitation    Vital Signs Last 24 Hrs  T(C): 36.6 (03-13-24 @ 19:33), Max: 36.7 (03-13-24 @ 11:47)  T(F): 97.8 (03-13-24 @ 19:33), Max: 98.1 (03-13-24 @ 11:47)  HR: 75 (03-13-24 @ 19:33) (60 - 75)  BP: 103/68 (03-13-24 @ 19:33) (103/68 - 108/55)  BP(mean): --  RR: 18 (03-13-24 @ 19:33) (18 - 18)  SpO2: 100% (03-13-24 @ 19:33) (100% - 100%)        PHYSICAL EXAM:  GENERAL: NAD, thin elderly, comfortable on room air  HEAD:  Atraumatic, Normocephalic  EYES: EOMI, PERRLA, conjunctiva and sclera clear  NECK: Supple, No JVD  CHEST/LUNG:dec breath sounds at bases  HEART: Regular rate and rhythm; No murmurs, rubs, or gallops  ABDOMEN: Soft, Nontender, Nondistended; Bowel sounds present  Neuro: awake alert,  EXTREMITIES:  2+ Peripheral Pulses, No clubbing, cyanosis, trace b/l edema  SKIN: No rashes or lesions     LABS:        Creatinine Trend:     Urine Studies:

## 2024-03-14 RX ADMIN — QUETIAPINE FUMARATE 50 MILLIGRAM(S): 200 TABLET, FILM COATED ORAL at 21:03

## 2024-03-14 RX ADMIN — Medication 3 MILLIGRAM(S): at 21:03

## 2024-03-14 RX ADMIN — QUETIAPINE FUMARATE 25 MILLIGRAM(S): 200 TABLET, FILM COATED ORAL at 05:49

## 2024-03-14 NOTE — PROGRESS NOTE ADULT - SUBJECTIVE AND OBJECTIVE BOX
pt seen and examined  3/14/24    MEDICATIONS  (STANDING):  melatonin 3 milliGRAM(s) Oral at bedtime  QUEtiapine 50 milliGRAM(s) Oral <User Schedule>  QUEtiapine 25 milliGRAM(s) Oral <User Schedule>  QUEtiapine 25 milliGRAM(s) Oral <User Schedule>    MEDICATIONS  (PRN):  acetaminophen     Tablet .. 650 milliGRAM(s) Oral every 6 hours PRN Temp greater or equal to 38C (100.4F), Mild Pain (1 - 3)  artificial tears (preservative free) Ophthalmic Solution 1 Drop(s) Both EYES three times a day PRN Dry Eyes  OLANZapine Injectable 1.25 milliGRAM(s) IntraMuscular every 6 hours PRN agitation    Vital Signs Last 24 Hrs  T(C): 36.7 (03-14-24 @ 19:28), Max: 36.7 (03-14-24 @ 19:28)  T(F): 98 (03-14-24 @ 19:28), Max: 98 (03-14-24 @ 19:28)  HR: 90 (03-14-24 @ 19:28) (90 - 103)  BP: 148/67 (03-14-24 @ 19:28) (114/74 - 148/67)  BP(mean): --  RR: 18 (03-14-24 @ 19:28) (18 - 18)  SpO2: 100% (03-14-24 @ 19:28) (100% - 100%)        PHYSICAL EXAM:  GENERAL: NAD, thin elderly, comfortable on room air  HEAD:  Atraumatic, Normocephalic  EYES: EOMI, PERRLA, conjunctiva and sclera clear  NECK: Supple, No JVD  CHEST/LUNG:dec breath sounds at bases  HEART: Regular rate and rhythm; No murmurs, rubs, or gallops  ABDOMEN: Soft, Nontender, Nondistended; Bowel sounds present  Neuro: awake alert,  EXTREMITIES:  2+ Peripheral Pulses, No clubbing, cyanosis, trace b/l edema  SKIN: No rashes or lesions     LABS:        Creatinine Trend:     Urine Studies:

## 2024-03-15 RX ADMIN — QUETIAPINE FUMARATE 50 MILLIGRAM(S): 200 TABLET, FILM COATED ORAL at 21:05

## 2024-03-15 RX ADMIN — QUETIAPINE FUMARATE 25 MILLIGRAM(S): 200 TABLET, FILM COATED ORAL at 13:20

## 2024-03-15 RX ADMIN — QUETIAPINE FUMARATE 25 MILLIGRAM(S): 200 TABLET, FILM COATED ORAL at 05:10

## 2024-03-15 RX ADMIN — Medication 3 MILLIGRAM(S): at 21:05

## 2024-03-15 NOTE — PROGRESS NOTE ADULT - SUBJECTIVE AND OBJECTIVE BOX
pt seen and examined  3/15/24    MEDICATIONS  (STANDING):  melatonin 3 milliGRAM(s) Oral at bedtime  QUEtiapine 25 milliGRAM(s) Oral <User Schedule>  QUEtiapine 25 milliGRAM(s) Oral <User Schedule>  QUEtiapine 50 milliGRAM(s) Oral <User Schedule>    MEDICATIONS  (PRN):  acetaminophen     Tablet .. 650 milliGRAM(s) Oral every 6 hours PRN Temp greater or equal to 38C (100.4F), Mild Pain (1 - 3)  artificial tears (preservative free) Ophthalmic Solution 1 Drop(s) Both EYES three times a day PRN Dry Eyes  OLANZapine Injectable 1.25 milliGRAM(s) IntraMuscular every 6 hours PRN agitation    Vital Signs Last 24 Hrs  T(C): 36.3 (03-15-24 @ 21:13), Max: 36.7 (03-15-24 @ 11:26)  T(F): 97.4 (03-15-24 @ 21:13), Max: 98 (03-15-24 @ 11:26)  HR: 73 (03-15-24 @ 21:13) (4 - 73)  BP: 141/70 (03-15-24 @ 21:13) (125/78 - 141/70)  BP(mean): --  RR: 18 (03-15-24 @ 21:13) (17 - 18)  SpO2: 98% (03-15-24 @ 21:13) (98% - 100%)          PHYSICAL EXAM:  GENERAL: NAD, thin elderly, comfortable on room air  HEAD:  Atraumatic, Normocephalic  EYES: EOMI, PERRLA, conjunctiva and sclera clear  NECK: Supple, No JVD  CHEST/LUNG:dec breath sounds at bases  HEART: Regular rate and rhythm; No murmurs, rubs, or gallops  ABDOMEN: Soft, Nontender, Nondistended; Bowel sounds present  Neuro: awake alert,  EXTREMITIES:  2+ Peripheral Pulses, No clubbing, cyanosis, trace b/l edema  SKIN: No rashes or lesions     LABS:        Creatinine Trend:     Urine Studies:

## 2024-03-16 RX ADMIN — QUETIAPINE FUMARATE 25 MILLIGRAM(S): 200 TABLET, FILM COATED ORAL at 13:36

## 2024-03-16 RX ADMIN — Medication 3 MILLIGRAM(S): at 21:07

## 2024-03-16 RX ADMIN — QUETIAPINE FUMARATE 50 MILLIGRAM(S): 200 TABLET, FILM COATED ORAL at 21:07

## 2024-03-16 RX ADMIN — QUETIAPINE FUMARATE 25 MILLIGRAM(S): 200 TABLET, FILM COATED ORAL at 05:43

## 2024-03-16 NOTE — PROGRESS NOTE ADULT - SUBJECTIVE AND OBJECTIVE BOX
pt seen and examined  3/16/24    MEDICATIONS  (STANDING):  melatonin 3 milliGRAM(s) Oral at bedtime  QUEtiapine 25 milliGRAM(s) Oral <User Schedule>  QUEtiapine 25 milliGRAM(s) Oral <User Schedule>  QUEtiapine 50 milliGRAM(s) Oral <User Schedule>    MEDICATIONS  (PRN):  acetaminophen     Tablet .. 650 milliGRAM(s) Oral every 6 hours PRN Temp greater or equal to 38C (100.4F), Mild Pain (1 - 3)  artificial tears (preservative free) Ophthalmic Solution 1 Drop(s) Both EYES three times a day PRN Dry Eyes  OLANZapine Injectable 1.25 milliGRAM(s) IntraMuscular every 6 hours PRN agitation    Vital Signs Last 24 Hrs  T(C): 37 (03-16-24 @ 20:51), Max: 37 (03-16-24 @ 20:51)  T(F): 98.6 (03-16-24 @ 20:51), Max: 98.6 (03-16-24 @ 20:51)  HR: 66 (03-16-24 @ 20:51) (60 - 66)  BP: 114/50 (03-16-24 @ 20:51) (114/50 - 142/79)  BP(mean): --  RR: 17 (03-16-24 @ 20:51) (17 - 17)  SpO2: 98% (03-16-24 @ 20:51) (98% - 100%)        PHYSICAL EXAM:  GENERAL: NAD, thin elderly, comfortable on room air  HEAD:  Atraumatic, Normocephalic  EYES: EOMI, PERRLA, conjunctiva and sclera clear  NECK: Supple, No JVD  CHEST/LUNG:dec breath sounds at bases  HEART: Regular rate and rhythm; No murmurs, rubs, or gallops  ABDOMEN: Soft, Nontender, Nondistended; Bowel sounds present  Neuro: awake alert,  EXTREMITIES:  2+ Peripheral Pulses, No clubbing, cyanosis, trace b/l edema  SKIN: No rashes or lesions     LABS:        Creatinine Trend:     Urine Studies:

## 2024-03-17 RX ADMIN — OLANZAPINE 1.25 MILLIGRAM(S): 15 TABLET, FILM COATED ORAL at 19:39

## 2024-03-17 RX ADMIN — Medication 3 MILLIGRAM(S): at 20:46

## 2024-03-17 RX ADMIN — QUETIAPINE FUMARATE 25 MILLIGRAM(S): 200 TABLET, FILM COATED ORAL at 13:24

## 2024-03-17 RX ADMIN — QUETIAPINE FUMARATE 25 MILLIGRAM(S): 200 TABLET, FILM COATED ORAL at 05:13

## 2024-03-17 RX ADMIN — QUETIAPINE FUMARATE 50 MILLIGRAM(S): 200 TABLET, FILM COATED ORAL at 20:38

## 2024-03-17 NOTE — PROGRESS NOTE ADULT - SUBJECTIVE AND OBJECTIVE BOX
pt seen and examined  3/17/24    MEDICATIONS  (STANDING):  melatonin 3 milliGRAM(s) Oral at bedtime  QUEtiapine 25 milliGRAM(s) Oral <User Schedule>  QUEtiapine 25 milliGRAM(s) Oral <User Schedule>  QUEtiapine 50 milliGRAM(s) Oral <User Schedule>    MEDICATIONS  (PRN):  acetaminophen     Tablet .. 650 milliGRAM(s) Oral every 6 hours PRN Temp greater or equal to 38C (100.4F), Mild Pain (1 - 3)  artificial tears (preservative free) Ophthalmic Solution 1 Drop(s) Both EYES three times a day PRN Dry Eyes  OLANZapine Injectable 1.25 milliGRAM(s) IntraMuscular every 6 hours PRN agitation    Vital Signs Last 24 Hrs  T(C): 36.8 (03-17-24 @ 19:58), Max: 36.8 (03-17-24 @ 19:58)  T(F): 98.2 (03-17-24 @ 19:58), Max: 98.2 (03-17-24 @ 19:58)  HR: 82 (03-17-24 @ 19:58) (68 - 82)  BP: 123/73 (03-17-24 @ 19:58) (123/73 - 140/65)  BP(mean): --  RR: 17 (03-17-24 @ 19:58) (17 - 18)  SpO2: 98% (03-17-24 @ 19:58) (98% - 100%)          PHYSICAL EXAM:  GENERAL: NAD, thin elderly, comfortable on room air  HEAD:  Atraumatic, Normocephalic  EYES: EOMI, PERRLA, conjunctiva and sclera clear  NECK: Supple, No JVD  CHEST/LUNG:dec breath sounds at bases  HEART: Regular rate and rhythm; No murmurs, rubs, or gallops  ABDOMEN: Soft, Nontender, Nondistended; Bowel sounds present  Neuro: awake alert,  EXTREMITIES:  2+ Peripheral Pulses, No clubbing, cyanosis, trace b/l edema  SKIN: No rashes or lesions     LABS:        Creatinine Trend:     Urine Studies:

## 2024-03-18 RX ADMIN — QUETIAPINE FUMARATE 25 MILLIGRAM(S): 200 TABLET, FILM COATED ORAL at 07:06

## 2024-03-18 RX ADMIN — Medication 3 MILLIGRAM(S): at 22:16

## 2024-03-18 RX ADMIN — QUETIAPINE FUMARATE 25 MILLIGRAM(S): 200 TABLET, FILM COATED ORAL at 13:32

## 2024-03-18 RX ADMIN — QUETIAPINE FUMARATE 50 MILLIGRAM(S): 200 TABLET, FILM COATED ORAL at 22:16

## 2024-03-18 NOTE — PROGRESS NOTE ADULT - SUBJECTIVE AND OBJECTIVE BOX
pt seen and examined  3/18/24    MEDICATIONS  (STANDING):  melatonin 3 milliGRAM(s) Oral at bedtime  QUEtiapine 25 milliGRAM(s) Oral <User Schedule>  QUEtiapine 25 milliGRAM(s) Oral <User Schedule>  QUEtiapine 50 milliGRAM(s) Oral <User Schedule>    MEDICATIONS  (PRN):  acetaminophen     Tablet .. 650 milliGRAM(s) Oral every 6 hours PRN Temp greater or equal to 38C (100.4F), Mild Pain (1 - 3)  artificial tears (preservative free) Ophthalmic Solution 1 Drop(s) Both EYES three times a day PRN Dry Eyes  OLANZapine Injectable 1.25 milliGRAM(s) IntraMuscular every 6 hours PRN agitation    Vital Signs Last 24 Hrs  T(C): 36.8 (03-17-24 @ 19:58), Max: 36.8 (03-17-24 @ 19:58)  T(F): 98.2 (03-17-24 @ 19:58), Max: 98.2 (03-17-24 @ 19:58)  HR: 82 (03-17-24 @ 19:58) (68 - 82)  BP: 123/73 (03-17-24 @ 19:58) (123/73 - 140/65)  BP(mean): --  RR: 17 (03-17-24 @ 19:58) (17 - 18)  SpO2: 98% (03-17-24 @ 19:58) (98% - 100%)          PHYSICAL EXAM:  GENERAL: NAD, thin elderly, comfortable on room air  HEAD:  Atraumatic, Normocephalic  EYES: EOMI, PERRLA, conjunctiva and sclera clear  NECK: Supple, No JVD  CHEST/LUNG:dec breath sounds at bases  HEART: Regular rate and rhythm; No murmurs, rubs, or gallops  ABDOMEN: Soft, Nontender, Nondistended; Bowel sounds present  Neuro: awake alert,  EXTREMITIES:  2+ Peripheral Pulses, No clubbing, cyanosis, trace b/l edema  SKIN: No rashes or lesions     LABS:        Creatinine Trend:     Urine Studies:

## 2024-03-19 RX ADMIN — QUETIAPINE FUMARATE 25 MILLIGRAM(S): 200 TABLET, FILM COATED ORAL at 13:11

## 2024-03-19 RX ADMIN — Medication 3 MILLIGRAM(S): at 20:16

## 2024-03-19 RX ADMIN — QUETIAPINE FUMARATE 50 MILLIGRAM(S): 200 TABLET, FILM COATED ORAL at 20:17

## 2024-03-19 RX ADMIN — QUETIAPINE FUMARATE 25 MILLIGRAM(S): 200 TABLET, FILM COATED ORAL at 06:46

## 2024-03-19 NOTE — PROGRESS NOTE ADULT - SUBJECTIVE AND OBJECTIVE BOX
pt seen and examined  3/19/24    MEDICATIONS  (STANDING):  melatonin 3 milliGRAM(s) Oral at bedtime  QUEtiapine 50 milliGRAM(s) Oral <User Schedule>  QUEtiapine 25 milliGRAM(s) Oral <User Schedule>  QUEtiapine 25 milliGRAM(s) Oral <User Schedule>    MEDICATIONS  (PRN):  acetaminophen     Tablet .. 650 milliGRAM(s) Oral every 6 hours PRN Temp greater or equal to 38C (100.4F), Mild Pain (1 - 3)  artificial tears (preservative free) Ophthalmic Solution 1 Drop(s) Both EYES three times a day PRN Dry Eyes  OLANZapine Injectable 1.25 milliGRAM(s) IntraMuscular every 6 hours PRN agitation    Vital Signs Last 24 Hrs  T(C): 36.1 (03-19-24 @ 20:09), Max: 36.7 (03-19-24 @ 12:55)  T(F): 97 (03-19-24 @ 20:09), Max: 98 (03-19-24 @ 12:55)  HR: 70 (03-19-24 @ 20:09) (68 - 70)  BP: 130/79 (03-19-24 @ 20:09) (130/79 - 144/76)  BP(mean): --  RR: 18 (03-19-24 @ 20:09) (16 - 18)  SpO2: 100% (03-19-24 @ 20:09) (100% - 100%)            PHYSICAL EXAM:  GENERAL: NAD, thin elderly, comfortable on room air  HEAD:  Atraumatic, Normocephalic  EYES: EOMI, PERRLA, conjunctiva and sclera clear  NECK: Supple, No JVD  CHEST/LUNG:dec breath sounds at bases  HEART: Regular rate and rhythm; No murmurs, rubs, or gallops  ABDOMEN: Soft, Nontender, Nondistended; Bowel sounds present  Neuro: awake alert,  EXTREMITIES:  2+ Peripheral Pulses, No clubbing, cyanosis, trace b/l edema  SKIN: No rashes or lesions     LABS:        Creatinine Trend:     Urine Studies:

## 2024-03-20 RX ADMIN — Medication 3 MILLIGRAM(S): at 21:02

## 2024-03-20 RX ADMIN — QUETIAPINE FUMARATE 50 MILLIGRAM(S): 200 TABLET, FILM COATED ORAL at 21:02

## 2024-03-20 RX ADMIN — QUETIAPINE FUMARATE 25 MILLIGRAM(S): 200 TABLET, FILM COATED ORAL at 06:25

## 2024-03-20 RX ADMIN — QUETIAPINE FUMARATE 25 MILLIGRAM(S): 200 TABLET, FILM COATED ORAL at 13:27

## 2024-03-20 NOTE — CHART NOTE - NSCHARTNOTEFT_GEN_A_CORE
NUTRITION FOLLOW-UP:    82 y/o F with PMH of possible dementia p/w confusion. and found to have UTI.    Pt f/u per protocol, consuming 75% meals with No GI distress (nausea/vomiting/diarrhea/constipation.) at present. Noted skin ecchymosis, +1 edema Rt ankle, +2 edema Lt leg ankle per nursing flow sheet. Noted fluid related wt fluctuations     Weight: 129.1# (3/20/24), 124.5# (2/28/24)    Labs:  n/a    Current Diet: Diet, Regular (12-29-23 @ 22:08) [Active]    Skin-ecchymosis, +1 edema Rt ankle, +2 edema Lt leg ankle as per RN flow sheet    MEDICATIONS  (STANDING):  melatonin 3 milliGRAM(s) Oral at bedtime  QUEtiapine 50 milliGRAM(s) Oral <User Schedule>  QUEtiapine 25 milliGRAM(s) Oral <User Schedule>  QUEtiapine 25 milliGRAM(s) Oral <User Schedule>    Estimated needs:  No changes since previous assessment    Nutrition Diagnosis:  Ongoing    RD to Remain Available:    Additional Recommendations:   Continue with current diet as ordered.  Monitor PO intake, weight trends, nutrition related lab values, BMs/GI distress, hydration status, skin integrity.       Kae Browne RDN #[id number]

## 2024-03-20 NOTE — PROGRESS NOTE ADULT - SUBJECTIVE AND OBJECTIVE BOX
pt seen and examined  3/20/24    MEDICATIONS  (STANDING):  melatonin 3 milliGRAM(s) Oral at bedtime  QUEtiapine 50 milliGRAM(s) Oral <User Schedule>  QUEtiapine 25 milliGRAM(s) Oral <User Schedule>  QUEtiapine 25 milliGRAM(s) Oral <User Schedule>    MEDICATIONS  (PRN):  acetaminophen     Tablet .. 650 milliGRAM(s) Oral every 6 hours PRN Temp greater or equal to 38C (100.4F), Mild Pain (1 - 3)  artificial tears (preservative free) Ophthalmic Solution 1 Drop(s) Both EYES three times a day PRN Dry Eyes  OLANZapine Injectable 1.25 milliGRAM(s) IntraMuscular every 6 hours PRN agitation    Vital Signs Last 24 Hrs  T(C): 36.6 (03-20-24 @ 21:05), Max: 36.7 (03-20-24 @ 11:00)  T(F): 97.9 (03-20-24 @ 21:05), Max: 98 (03-20-24 @ 11:00)  HR: 78 (03-20-24 @ 21:05) (66 - 78)  BP: 142/78 (03-20-24 @ 21:05) (142/78 - 148/95)  BP(mean): --  RR: 18 (03-20-24 @ 21:05) (18 - 18)  SpO2: 100% (03-20-24 @ 21:05) (100% - 100%)              PHYSICAL EXAM:  GENERAL: NAD, thin elderly, comfortable on room air  HEAD:  Atraumatic, Normocephalic  EYES: EOMI, PERRLA, conjunctiva and sclera clear  NECK: Supple, No JVD  CHEST/LUNG:dec breath sounds at bases  HEART: Regular rate and rhythm; No murmurs, rubs, or gallops  ABDOMEN: Soft, Nontender, Nondistended; Bowel sounds present  Neuro: awake alert,  EXTREMITIES:  2+ Peripheral Pulses, No clubbing, cyanosis, trace b/l edema  SKIN: No rashes or lesions     LABS:        Creatinine Trend:     Urine Studies:

## 2024-03-21 RX ADMIN — QUETIAPINE FUMARATE 25 MILLIGRAM(S): 200 TABLET, FILM COATED ORAL at 06:59

## 2024-03-21 RX ADMIN — QUETIAPINE FUMARATE 50 MILLIGRAM(S): 200 TABLET, FILM COATED ORAL at 22:05

## 2024-03-21 RX ADMIN — QUETIAPINE FUMARATE 25 MILLIGRAM(S): 200 TABLET, FILM COATED ORAL at 14:35

## 2024-03-21 RX ADMIN — OLANZAPINE 1.25 MILLIGRAM(S): 15 TABLET, FILM COATED ORAL at 03:29

## 2024-03-21 RX ADMIN — Medication 3 MILLIGRAM(S): at 22:04

## 2024-03-21 NOTE — PROGRESS NOTE ADULT - SUBJECTIVE AND OBJECTIVE BOX
pt seen and examined  3/22/24    MEDICATIONS  (STANDING):  melatonin 3 milliGRAM(s) Oral at bedtime  QUEtiapine 50 milliGRAM(s) Oral <User Schedule>  QUEtiapine 25 milliGRAM(s) Oral <User Schedule>  QUEtiapine 25 milliGRAM(s) Oral <User Schedule>    MEDICATIONS  (PRN):  acetaminophen     Tablet .. 650 milliGRAM(s) Oral every 6 hours PRN Temp greater or equal to 38C (100.4F), Mild Pain (1 - 3)  artificial tears (preservative free) Ophthalmic Solution 1 Drop(s) Both EYES three times a day PRN Dry Eyes  OLANZapine Injectable 1.25 milliGRAM(s) IntraMuscular every 6 hours PRN agitation    Vital Signs Last 24 Hrs  T(C): 36.7 (03-22-24 @ 22:14), Max: 37.1 (03-22-24 @ 14:55)  T(F): 98.1 (03-22-24 @ 22:14), Max: 98.8 (03-22-24 @ 14:55)  HR: 66 (03-22-24 @ 22:14) (66 - 89)  BP: 139/64 (03-22-24 @ 22:14) (118/77 - 139/64)  BP(mean): --  RR: 17 (03-22-24 @ 22:14) (17 - 18)  SpO2: 99% (03-22-24 @ 22:14) (96% - 99%)        PHYSICAL EXAM:  GENERAL: NAD, thin elderly, comfortable on room air  HEAD:  Atraumatic, Normocephalic  EYES: EOMI, PERRLA, conjunctiva and sclera clear  NECK: Supple, No JVD  CHEST/LUNG:dec breath sounds at bases  HEART: Regular rate and rhythm; No murmurs, rubs, or gallops  ABDOMEN: Soft, Nontender, Nondistended; Bowel sounds present  Neuro: awake alert,  EXTREMITIES:  2+ Peripheral Pulses, No clubbing, cyanosis, trace b/l edema  SKIN: No rashes or lesions     LABS:        Creatinine Trend:     Urine Studies:

## 2024-03-22 RX ADMIN — Medication 3 MILLIGRAM(S): at 23:39

## 2024-03-22 RX ADMIN — QUETIAPINE FUMARATE 25 MILLIGRAM(S): 200 TABLET, FILM COATED ORAL at 14:10

## 2024-03-22 RX ADMIN — QUETIAPINE FUMARATE 50 MILLIGRAM(S): 200 TABLET, FILM COATED ORAL at 23:39

## 2024-03-22 RX ADMIN — QUETIAPINE FUMARATE 25 MILLIGRAM(S): 200 TABLET, FILM COATED ORAL at 07:10

## 2024-03-23 RX ADMIN — QUETIAPINE FUMARATE 50 MILLIGRAM(S): 200 TABLET, FILM COATED ORAL at 21:09

## 2024-03-23 RX ADMIN — QUETIAPINE FUMARATE 25 MILLIGRAM(S): 200 TABLET, FILM COATED ORAL at 14:28

## 2024-03-23 RX ADMIN — Medication 3 MILLIGRAM(S): at 21:08

## 2024-03-23 RX ADMIN — QUETIAPINE FUMARATE 25 MILLIGRAM(S): 200 TABLET, FILM COATED ORAL at 05:49

## 2024-03-23 RX ADMIN — OLANZAPINE 1.25 MILLIGRAM(S): 15 TABLET, FILM COATED ORAL at 21:09

## 2024-03-23 NOTE — PROGRESS NOTE ADULT - SUBJECTIVE AND OBJECTIVE BOX
pt seen and examined  3/23/24    MEDICATIONS  (STANDING):  melatonin 3 milliGRAM(s) Oral at bedtime  QUEtiapine 50 milliGRAM(s) Oral <User Schedule>  QUEtiapine 25 milliGRAM(s) Oral <User Schedule>  QUEtiapine 25 milliGRAM(s) Oral <User Schedule>    MEDICATIONS  (PRN):  acetaminophen     Tablet .. 650 milliGRAM(s) Oral every 6 hours PRN Temp greater or equal to 38C (100.4F), Mild Pain (1 - 3)  artificial tears (preservative free) Ophthalmic Solution 1 Drop(s) Both EYES three times a day PRN Dry Eyes  OLANZapine Injectable 1.25 milliGRAM(s) IntraMuscular every 6 hours PRN agitation    Vital Signs Last 24 Hrs  T(C): 36.7 (03-23-24 @ 20:48), Max: 36.7 (03-23-24 @ 20:48)  T(F): 98 (03-23-24 @ 20:48), Max: 98 (03-23-24 @ 20:48)  HR: 70 (03-23-24 @ 20:48) (70 - 77)  BP: 130/75 (03-23-24 @ 20:48) (129/78 - 130/75)  BP(mean): --  RR: 18 (03-23-24 @ 20:48) (18 - 18)  SpO2: 100% (03-23-24 @ 20:48) (100% - 100%)      PHYSICAL EXAM:  GENERAL: NAD, thin elderly, comfortable on room air  HEAD:  Atraumatic, Normocephalic  EYES: EOMI, PERRLA, conjunctiva and sclera clear  NECK: Supple, No JVD  CHEST/LUNG:dec breath sounds at bases  HEART: Regular rate and rhythm; No murmurs, rubs, or gallops  ABDOMEN: Soft, Nontender, Nondistended; Bowel sounds present  Neuro: awake alert,  EXTREMITIES:  2+ Peripheral Pulses, No clubbing, cyanosis, trace b/l edema  SKIN: No rashes or lesions     LABS:        Creatinine Trend:     Urine Studies:

## 2024-03-24 RX ORDER — SENNA PLUS 8.6 MG/1
2 TABLET ORAL AT BEDTIME
Refills: 0 | Status: DISCONTINUED | OUTPATIENT
Start: 2024-03-24 | End: 2024-04-30

## 2024-03-24 RX ORDER — MAGNESIUM HYDROXIDE 400 MG/1
30 TABLET, CHEWABLE ORAL ONCE
Refills: 0 | Status: COMPLETED | OUTPATIENT
Start: 2024-03-24 | End: 2024-03-24

## 2024-03-24 RX ORDER — POLYETHYLENE GLYCOL 3350 17 G/17G
17 POWDER, FOR SOLUTION ORAL DAILY
Refills: 0 | Status: DISCONTINUED | OUTPATIENT
Start: 2024-03-24 | End: 2024-04-30

## 2024-03-24 RX ADMIN — POLYETHYLENE GLYCOL 3350 17 GRAM(S): 17 POWDER, FOR SOLUTION ORAL at 13:23

## 2024-03-24 RX ADMIN — SENNA PLUS 2 TABLET(S): 8.6 TABLET ORAL at 21:41

## 2024-03-24 RX ADMIN — Medication 3 MILLIGRAM(S): at 21:41

## 2024-03-24 RX ADMIN — MAGNESIUM HYDROXIDE 30 MILLILITER(S): 400 TABLET, CHEWABLE ORAL at 09:44

## 2024-03-24 RX ADMIN — OLANZAPINE 1.25 MILLIGRAM(S): 15 TABLET, FILM COATED ORAL at 21:41

## 2024-03-24 RX ADMIN — QUETIAPINE FUMARATE 50 MILLIGRAM(S): 200 TABLET, FILM COATED ORAL at 21:41

## 2024-03-24 RX ADMIN — QUETIAPINE FUMARATE 25 MILLIGRAM(S): 200 TABLET, FILM COATED ORAL at 13:24

## 2024-03-24 RX ADMIN — QUETIAPINE FUMARATE 25 MILLIGRAM(S): 200 TABLET, FILM COATED ORAL at 05:17

## 2024-03-24 RX ADMIN — OLANZAPINE 1.25 MILLIGRAM(S): 15 TABLET, FILM COATED ORAL at 15:08

## 2024-03-24 NOTE — PROGRESS NOTE ADULT - SUBJECTIVE AND OBJECTIVE BOX
pt seen and examined  3/24/24    MEDICATIONS  (STANDING):  melatonin 3 milliGRAM(s) Oral at bedtime  polyethylene glycol 3350 17 Gram(s) Oral daily  QUEtiapine 50 milliGRAM(s) Oral <User Schedule>  QUEtiapine 25 milliGRAM(s) Oral <User Schedule>  QUEtiapine 25 milliGRAM(s) Oral <User Schedule>  senna 2 Tablet(s) Oral at bedtime    MEDICATIONS  (PRN):  acetaminophen     Tablet .. 650 milliGRAM(s) Oral every 6 hours PRN Temp greater or equal to 38C (100.4F), Mild Pain (1 - 3)  artificial tears (preservative free) Ophthalmic Solution 1 Drop(s) Both EYES three times a day PRN Dry Eyes  OLANZapine Injectable 1.25 milliGRAM(s) IntraMuscular every 6 hours PRN agitation    Vital Signs Last 24 Hrs  T(C): 36.7 (03-24-24 @ 19:34), Max: 36.7 (03-24-24 @ 19:34)  T(F): 98 (03-24-24 @ 19:34), Max: 98 (03-24-24 @ 19:34)  HR: 72 (03-24-24 @ 19:34) (62 - 72)  BP: 124/52 (03-24-24 @ 19:34) (124/52 - 127/62)  BP(mean): --  RR: 18 (03-24-24 @ 19:34) (17 - 18)  SpO2: 100% (03-24-24 @ 19:34) (100% - 100%)        PHYSICAL EXAM:  GENERAL: NAD, thin elderly, comfortable on room air  HEAD:  Atraumatic, Normocephalic  EYES: EOMI, PERRLA, conjunctiva and sclera clear  NECK: Supple, No JVD  CHEST/LUNG:dec breath sounds at bases  HEART: Regular rate and rhythm; No murmurs, rubs, or gallops  ABDOMEN: Soft, Nontender, Nondistended; Bowel sounds present  Neuro: awake alert,  EXTREMITIES:  2+ Peripheral Pulses, No clubbing, cyanosis, trace b/l edema  SKIN: No rashes or lesions     LABS:        Creatinine Trend:     Urine Studies:

## 2024-03-25 RX ADMIN — QUETIAPINE FUMARATE 50 MILLIGRAM(S): 200 TABLET, FILM COATED ORAL at 19:17

## 2024-03-25 RX ADMIN — QUETIAPINE FUMARATE 25 MILLIGRAM(S): 200 TABLET, FILM COATED ORAL at 13:23

## 2024-03-25 RX ADMIN — POLYETHYLENE GLYCOL 3350 17 GRAM(S): 17 POWDER, FOR SOLUTION ORAL at 13:22

## 2024-03-25 RX ADMIN — QUETIAPINE FUMARATE 25 MILLIGRAM(S): 200 TABLET, FILM COATED ORAL at 11:24

## 2024-03-25 RX ADMIN — Medication 3 MILLIGRAM(S): at 21:29

## 2024-03-25 RX ADMIN — SENNA PLUS 2 TABLET(S): 8.6 TABLET ORAL at 21:29

## 2024-03-25 NOTE — PROGRESS NOTE ADULT - SUBJECTIVE AND OBJECTIVE BOX
pt seen and examined  3/25/24    MEDICATIONS  (STANDING):  melatonin 3 milliGRAM(s) Oral at bedtime  polyethylene glycol 3350 17 Gram(s) Oral daily  QUEtiapine 25 milliGRAM(s) Oral <User Schedule>  QUEtiapine 50 milliGRAM(s) Oral <User Schedule>  QUEtiapine 25 milliGRAM(s) Oral <User Schedule>  senna 2 Tablet(s) Oral at bedtime    MEDICATIONS  (PRN):  acetaminophen     Tablet .. 650 milliGRAM(s) Oral every 6 hours PRN Temp greater or equal to 38C (100.4F), Mild Pain (1 - 3)  artificial tears (preservative free) Ophthalmic Solution 1 Drop(s) Both EYES three times a day PRN Dry Eyes  OLANZapine Injectable 1.25 milliGRAM(s) IntraMuscular every 6 hours PRN agitation    Vital Signs Last 24 Hrs  T(C): 36.8 (03-25-24 @ 19:43), Max: 36.9 (03-25-24 @ 11:44)  T(F): 98.2 (03-25-24 @ 19:43), Max: 98.4 (03-25-24 @ 11:44)  HR: 72 (03-25-24 @ 19:43) (66 - 110)  BP: 108/93 (03-25-24 @ 19:43) (108/93 - 151/67)  BP(mean): 80 (03-25-24 @ 05:42) (80 - 80)  RR: 18 (03-25-24 @ 19:43) (18 - 18)  SpO2: 100% (03-25-24 @ 19:43) (96% - 100%)        PHYSICAL EXAM:  GENERAL: NAD, thin elderly, comfortable on room air  HEAD:  Atraumatic, Normocephalic  EYES: EOMI, PERRLA, conjunctiva and sclera clear  NECK: Supple, No JVD  CHEST/LUNG:dec breath sounds at bases  HEART: Regular rate and rhythm; No murmurs, rubs, or gallops  ABDOMEN: Soft, Nontender, Nondistended; Bowel sounds present  Neuro: awake alert,  EXTREMITIES:  2+ Peripheral Pulses, No clubbing, cyanosis, trace b/l edema  SKIN: No rashes or lesions     LABS:        Creatinine Trend:     Urine Studies:                 Retention Suture Bite Size: 3 mm

## 2024-03-26 PROCEDURE — 70450 CT HEAD/BRAIN W/O DYE: CPT | Mod: 26

## 2024-03-26 RX ADMIN — SENNA PLUS 2 TABLET(S): 8.6 TABLET ORAL at 21:00

## 2024-03-26 RX ADMIN — QUETIAPINE FUMARATE 50 MILLIGRAM(S): 200 TABLET, FILM COATED ORAL at 19:32

## 2024-03-26 RX ADMIN — POLYETHYLENE GLYCOL 3350 17 GRAM(S): 17 POWDER, FOR SOLUTION ORAL at 12:05

## 2024-03-26 RX ADMIN — QUETIAPINE FUMARATE 25 MILLIGRAM(S): 200 TABLET, FILM COATED ORAL at 15:06

## 2024-03-26 RX ADMIN — QUETIAPINE FUMARATE 25 MILLIGRAM(S): 200 TABLET, FILM COATED ORAL at 06:24

## 2024-03-26 RX ADMIN — Medication 3 MILLIGRAM(S): at 21:00

## 2024-03-26 NOTE — PROVIDER CONTACT NOTE (OTHER) - ASSESSMENT
Pt assessed, Pt has redness on forehead and  complaint of mild head pain. Ice pack given, Pt refused pain medication.
Bilateral leg swelling
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
## 2024-03-26 NOTE — PROGRESS NOTE ADULT - SUBJECTIVE AND OBJECTIVE BOX
pt seen and examined  pt had a fall earlier , no loc  head ct ordered  3/26/24    MEDICATIONS  (STANDING):  melatonin 3 milliGRAM(s) Oral at bedtime  polyethylene glycol 3350 17 Gram(s) Oral daily  QUEtiapine 50 milliGRAM(s) Oral <User Schedule>  QUEtiapine 25 milliGRAM(s) Oral <User Schedule>  QUEtiapine 25 milliGRAM(s) Oral <User Schedule>  senna 2 Tablet(s) Oral at bedtime    MEDICATIONS  (PRN):  acetaminophen     Tablet .. 650 milliGRAM(s) Oral every 6 hours PRN Temp greater or equal to 38C (100.4F), Mild Pain (1 - 3)  artificial tears (preservative free) Ophthalmic Solution 1 Drop(s) Both EYES three times a day PRN Dry Eyes  OLANZapine Injectable 1.25 milliGRAM(s) IntraMuscular every 6 hours PRN agitation    Vital Signs Last 24 Hrs  T(C): 36.8 (03-26-24 @ 20:00), Max: 36.8 (03-26-24 @ 20:00)  T(F): 98.2 (03-26-24 @ 20:00), Max: 98.2 (03-26-24 @ 20:00)  HR: 74 (03-26-24 @ 20:00) (62 - 74)  BP: 122/58 (03-26-24 @ 20:00) (117/58 - 143/58)  BP(mean): --  RR: 18 (03-26-24 @ 20:00) (17 - 18)  SpO2: 100% (03-26-24 @ 20:00) (98% - 100%)          PHYSICAL EXAM:  GENERAL: NAD, thin elderly, comfortable on room air  HEAD:  Atraumatic, Normocephalic  EYES: EOMI, PERRLA, conjunctiva and sclera clear  NECK: Supple, No JVD  CHEST/LUNG:dec breath sounds at bases  HEART: Regular rate and rhythm; No murmurs, rubs, or gallops  ABDOMEN: Soft, Nontender, Nondistended; Bowel sounds present  Neuro: awake alert,  EXTREMITIES:  2+ Peripheral Pulses, No clubbing, cyanosis, trace b/l edema  SKIN: No rashes or lesions     LABS:        Creatinine Trend:     Urine Studies:
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
## 2024-03-27 LAB
ALBUMIN SERPL ELPH-MCNC: 3.7 G/DL — SIGNIFICANT CHANGE UP (ref 3.3–5)
ALP SERPL-CCNC: 73 U/L — SIGNIFICANT CHANGE UP (ref 40–120)
ALT FLD-CCNC: 20 U/L — SIGNIFICANT CHANGE UP (ref 4–33)
ANION GAP SERPL CALC-SCNC: 11 MMOL/L — SIGNIFICANT CHANGE UP (ref 7–14)
AST SERPL-CCNC: 32 U/L — SIGNIFICANT CHANGE UP (ref 4–32)
BASOPHILS # BLD AUTO: 0.01 K/UL — SIGNIFICANT CHANGE UP (ref 0–0.2)
BASOPHILS NFR BLD AUTO: 0.1 % — SIGNIFICANT CHANGE UP (ref 0–2)
BILIRUB SERPL-MCNC: <0.2 MG/DL — SIGNIFICANT CHANGE UP (ref 0.2–1.2)
BUN SERPL-MCNC: 34 MG/DL — HIGH (ref 7–23)
CALCIUM SERPL-MCNC: 9 MG/DL — SIGNIFICANT CHANGE UP (ref 8.4–10.5)
CHLORIDE SERPL-SCNC: 104 MMOL/L — SIGNIFICANT CHANGE UP (ref 98–107)
CO2 SERPL-SCNC: 24 MMOL/L — SIGNIFICANT CHANGE UP (ref 22–31)
CREAT SERPL-MCNC: 0.85 MG/DL — SIGNIFICANT CHANGE UP (ref 0.5–1.3)
EGFR: 68 ML/MIN/1.73M2 — SIGNIFICANT CHANGE UP
EOSINOPHIL # BLD AUTO: 0.16 K/UL — SIGNIFICANT CHANGE UP (ref 0–0.5)
EOSINOPHIL NFR BLD AUTO: 1.9 % — SIGNIFICANT CHANGE UP (ref 0–6)
GLUCOSE SERPL-MCNC: 99 MG/DL — SIGNIFICANT CHANGE UP (ref 70–99)
HCT VFR BLD CALC: 36 % — SIGNIFICANT CHANGE UP (ref 34.5–45)
HGB BLD-MCNC: 11.7 G/DL — SIGNIFICANT CHANGE UP (ref 11.5–15.5)
IANC: 6.3 K/UL — SIGNIFICANT CHANGE UP (ref 1.8–7.4)
IMM GRANULOCYTES NFR BLD AUTO: 0.2 % — SIGNIFICANT CHANGE UP (ref 0–0.9)
LYMPHOCYTES # BLD AUTO: 1.04 K/UL — SIGNIFICANT CHANGE UP (ref 1–3.3)
LYMPHOCYTES # BLD AUTO: 12.6 % — LOW (ref 13–44)
MAGNESIUM SERPL-MCNC: 1.9 MG/DL — SIGNIFICANT CHANGE UP (ref 1.6–2.6)
MCHC RBC-ENTMCNC: 30.8 PG — SIGNIFICANT CHANGE UP (ref 27–34)
MCHC RBC-ENTMCNC: 32.5 GM/DL — SIGNIFICANT CHANGE UP (ref 32–36)
MCV RBC AUTO: 94.7 FL — SIGNIFICANT CHANGE UP (ref 80–100)
MONOCYTES # BLD AUTO: 0.73 K/UL — SIGNIFICANT CHANGE UP (ref 0–0.9)
MONOCYTES NFR BLD AUTO: 8.8 % — SIGNIFICANT CHANGE UP (ref 2–14)
NEUTROPHILS # BLD AUTO: 6.3 K/UL — SIGNIFICANT CHANGE UP (ref 1.8–7.4)
NEUTROPHILS NFR BLD AUTO: 76.4 % — SIGNIFICANT CHANGE UP (ref 43–77)
NRBC # BLD: 0 /100 WBCS — SIGNIFICANT CHANGE UP (ref 0–0)
NRBC # FLD: 0 K/UL — SIGNIFICANT CHANGE UP (ref 0–0)
PLATELET # BLD AUTO: 138 K/UL — LOW (ref 150–400)
POTASSIUM SERPL-MCNC: 4.2 MMOL/L — SIGNIFICANT CHANGE UP (ref 3.5–5.3)
POTASSIUM SERPL-SCNC: 4.2 MMOL/L — SIGNIFICANT CHANGE UP (ref 3.5–5.3)
PROT SERPL-MCNC: 6.8 G/DL — SIGNIFICANT CHANGE UP (ref 6–8.3)
RBC # BLD: 3.8 M/UL — SIGNIFICANT CHANGE UP (ref 3.8–5.2)
RBC # FLD: 13.5 % — SIGNIFICANT CHANGE UP (ref 10.3–14.5)
SODIUM SERPL-SCNC: 139 MMOL/L — SIGNIFICANT CHANGE UP (ref 135–145)
WBC # BLD: 8.26 K/UL — SIGNIFICANT CHANGE UP (ref 3.8–10.5)
WBC # FLD AUTO: 8.26 K/UL — SIGNIFICANT CHANGE UP (ref 3.8–10.5)

## 2024-03-27 PROCEDURE — 99232 SBSQ HOSP IP/OBS MODERATE 35: CPT

## 2024-03-27 RX ORDER — QUETIAPINE FUMARATE 200 MG/1
75 TABLET, FILM COATED ORAL
Refills: 0 | Status: DISCONTINUED | OUTPATIENT
Start: 2024-03-27 | End: 2024-04-09

## 2024-03-27 RX ORDER — LANOLIN ALCOHOL/MO/W.PET/CERES
3 CREAM (GRAM) TOPICAL
Refills: 0 | Status: DISCONTINUED | OUTPATIENT
Start: 2024-03-27 | End: 2024-04-30

## 2024-03-27 RX ADMIN — QUETIAPINE FUMARATE 25 MILLIGRAM(S): 200 TABLET, FILM COATED ORAL at 13:47

## 2024-03-27 RX ADMIN — QUETIAPINE FUMARATE 25 MILLIGRAM(S): 200 TABLET, FILM COATED ORAL at 05:00

## 2024-03-27 RX ADMIN — Medication 3 MILLIGRAM(S): at 19:54

## 2024-03-27 RX ADMIN — QUETIAPINE FUMARATE 75 MILLIGRAM(S): 200 TABLET, FILM COATED ORAL at 18:47

## 2024-03-27 NOTE — PROGRESS NOTE ADULT - SUBJECTIVE AND OBJECTIVE BOX
pt seen and examined  3/27/24      MEDICATIONS  (STANDING):  melatonin 3 milliGRAM(s) Oral <User Schedule>  polyethylene glycol 3350 17 Gram(s) Oral daily  QUEtiapine 75 milliGRAM(s) Oral <User Schedule>  QUEtiapine 25 milliGRAM(s) Oral <User Schedule>  QUEtiapine 25 milliGRAM(s) Oral <User Schedule>  senna 2 Tablet(s) Oral at bedtime    MEDICATIONS  (PRN):  acetaminophen     Tablet .. 650 milliGRAM(s) Oral every 6 hours PRN Temp greater or equal to 38C (100.4F), Mild Pain (1 - 3)  artificial tears (preservative free) Ophthalmic Solution 1 Drop(s) Both EYES three times a day PRN Dry Eyes  OLANZapine Injectable 1.25 milliGRAM(s) IntraMuscular every 6 hours PRN agitation    Vital Signs Last 24 Hrs  T(C): 36.4 (03-27-24 @ 19:44), Max: 36.6 (03-27-24 @ 11:44)  T(F): 97.6 (03-27-24 @ 19:44), Max: 97.9 (03-27-24 @ 11:44)  HR: 85 (03-27-24 @ 19:44) (61 - 85)  BP: 147/56 (03-27-24 @ 19:44) (138/57 - 147/56)  BP(mean): --  RR: 18 (03-27-24 @ 19:44) (17 - 18)  SpO2: 100% (03-27-24 @ 19:44) (100% - 100%)            PHYSICAL EXAM:  GENERAL: NAD, thin elderly, comfortable on room air  HEAD:  Atraumatic, Normocephalic  EYES: EOMI, PERRLA, conjunctiva and sclera clear  NECK: Supple, No JVD  CHEST/LUNG:dec breath sounds at bases  HEART: Regular rate and rhythm; No murmurs, rubs, or gallops  ABDOMEN: Soft, Nontender, Nondistended; Bowel sounds present  Neuro: awake alert,  EXTREMITIES:  2+ Peripheral Pulses, No clubbing, cyanosis, trace b/l edema  SKIN: No rashes or lesions     LABS:        Creatinine Trend:     Urine Studies:

## 2024-03-27 NOTE — BH CONSULTATION LIAISON PROGRESS NOTE - NSBHATTESTBILLING_PSY_A_CORE
EXAMINATION TYPE: US venous doppler duplex LE LT

 

DATE OF EXAM: 9/25/2018 4:29 PM

 

COMPARISON: NONE

 

CLINICAL HISTORY: Pain. Left leg edema

 

SIDE PERFORMED: left   

 

TECHNIQUE:  The lower extremity deep venous system is examined utilizing real time linear array sonog
aranldo with graded compression, doppler sonography and color-flow sonography.

 

VESSELS IMAGED:

External Iliac Vein (EIV)

Common Femoral Vein

Deep Femoral Vein

Greater Saphenous Vein *

Femoral Vein

Popliteal Vein

Small Saphenous Vein *

Proximal Calf Veins

(* superficial vessels) 

 

FINDINGS: Grayscale, color doppler, spectral doppler imaging performed of the deep veins of the lower
 extremities.  There is normal flow, compressibility, vascular waveforms.  

 

 

IMPRESSION: NEGATIVE FOR DVT LEFT LOWER EXTREMITY. 22660-[id number] OBS or IP - low complexity OR 25-34 mins

## 2024-03-27 NOTE — BH CONSULTATION LIAISON PROGRESS NOTE - NSBHFUPINTERVALHXFT_PSY_A_CORE
Calm, confused, confabulation. No evident mood symptoms, denies any si or hi, no psychosis  Examined-- no cogwheeling or rigidity
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
## 2024-03-27 NOTE — BH CONSULTATION LIAISON PROGRESS NOTE - NSBHASSESSMENTFT_PSY_ALL_CORE
82 y/o F with PMH of possible dementia p/w confusion.  Pt was found by bystander (who is a nurse) wandering by a local grocery store and appeared confused.  Bystander brought pt to her home and noted pt's home was in a poor state, and did not seem safe for pt to live alone.  Pt brought to ED by EMS.     On evaluation patient is alert, oriented X1, calm but confused, needed a lot of redirection but was not agitated. in the ED patient found to have UTI and antibiotic was started. with no know past psychiatric history and her history of infection it is most likely to be delirium with underlying dementia. currently patient is calm, did not need any PRNs for the last 2 days.     1/5: Patient orientedX1, confused, not sleeping and getting agitated at night where she requires PRNs.   1/8: Patient is oriented x1. better sleep and no PRNS. Plan to continue Seroquel 25 mg at 8 pm.   1/24: Patient is confused, received Olanzapine PRN PO X1 for agitation in the last 24 hours. Can increase Seroquel to 50 mg at 8 pm.   1/31: Patient is confused, intermittently agitated receiving PRNs, Patient often get agitated due to confusion in the morning and would benefit from a small dose of Seroquel in the morning as it has been working well.   2/16: Received IM olanzapine and haloperidol overnight, likely in setting of sundowning/confusion. Reporting anxiety today. Increase quetiapine afternoon dose of seroquel (from 12.5mg to 25mg).   2/20: much improved, no recent PRNs. Calm, pleasant, poor insight (baseline).    Plan:   - Routine observation as pt denies SI/HI  - Does not have capacity to participate in safe discharge planning   - PRNs: For agitation, can use Olanzapine 1.25 mg PO/IM PRN Q6hr.   - INCREASE DOSE OF quetiapine to  25 mg at 6:00, 25 mg at 14:00, and 75mg at 20:00 for mood, anxiety, and insomnia.  - Medical management per primary team   - Hold antipsychotics if QTc > 500 ms    Dispo: No role for inpatient psychiatry admission, but patient would benefit from higher level of care like a nursing home.   84 y/o F with PMH of possible dementia p/w confusion.  Pt was found by bystander (who is a nurse) wandering by a local grocery store and appeared confused.  Bystander brought pt to her home and noted pt's home was in a poor state, and did not seem safe for pt to live alone.  Pt brought to ED by EMS.     On evaluation patient is alert, oriented X1, calm but confused, needed a lot of redirection but was not agitated. in the ED patient found to have UTI and antibiotic was started. with no know past psychiatric history and her history of infection it is most likely to be delirium with underlying dementia. currently patient is calm, did not need any PRNs for the last 2 days.     1/5: Patient orientedX1, confused, not sleeping and getting agitated at night where she requires PRNs.   1/8: Patient is oriented x1. better sleep and no PRNS. Plan to continue Seroquel 25 mg at 8 pm.   1/24: Patient is confused, received Olanzapine PRN PO X1 for agitation in the last 24 hours. Can increase Seroquel to 50 mg at 8 pm.   1/31: Patient is confused, intermittently agitated receiving PRNs, Patient often get agitated due to confusion in the morning and would benefit from a small dose of Seroquel in the morning as it has been working well.   2/16: Received IM olanzapine and haloperidol overnight, likely in setting of sundowning/confusion. Reporting anxiety today. Increase quetiapine afternoon dose of seroquel (from 12.5mg to 25mg).   2/20: much improved, no recent PRNs. Calm, pleasant, poor insight (baseline).    Plan:   - Routine observation as pt denies SI/HI  - Does not have capacity to participate in safe discharge planning   - PRNs: For agitation, can use Olanzapine 1.25 mg PO/IM PRN Q6hr.   - Considering recent increase in agitation, check cbc with diff, cmp, UA. No labs in weeks.   - INCREASE DOSE OF quetiapine to  25 mg at 6:00, 25 mg at 14:00, and 75mg at 20:00 for mood, anxiety, and insomnia.  - Medical management per primary team   - Hold antipsychotics if QTc > 500 ms    Dispo: No role for inpatient psychiatry admission, but patient would benefit from higher level of care like a nursing home.

## 2024-03-28 LAB
APPEARANCE UR: ABNORMAL
BACTERIA # UR AUTO: ABNORMAL /HPF
BILIRUB UR-MCNC: NEGATIVE — SIGNIFICANT CHANGE UP
CAST: 1 /LPF — SIGNIFICANT CHANGE UP (ref 0–4)
COLOR SPEC: YELLOW — SIGNIFICANT CHANGE UP
DIFF PNL FLD: ABNORMAL
GLUCOSE UR QL: NEGATIVE MG/DL — SIGNIFICANT CHANGE UP
KETONES UR-MCNC: NEGATIVE MG/DL — SIGNIFICANT CHANGE UP
LEUKOCYTE ESTERASE UR-ACNC: ABNORMAL
NITRITE UR-MCNC: POSITIVE
PH UR: 5.5 — SIGNIFICANT CHANGE UP (ref 5–8)
PROT UR-MCNC: NEGATIVE MG/DL — SIGNIFICANT CHANGE UP
RBC CASTS # UR COMP ASSIST: 31 /HPF — HIGH (ref 0–4)
SP GR SPEC: 1.02 — SIGNIFICANT CHANGE UP (ref 1–1.03)
SQUAMOUS # UR AUTO: 4 /HPF — SIGNIFICANT CHANGE UP (ref 0–5)
UROBILINOGEN FLD QL: 0.2 MG/DL — SIGNIFICANT CHANGE UP (ref 0.2–1)
WBC UR QL: 42 /HPF — HIGH (ref 0–5)

## 2024-03-28 RX ORDER — CEFTRIAXONE 500 MG/1
1000 INJECTION, POWDER, FOR SOLUTION INTRAMUSCULAR; INTRAVENOUS EVERY 24 HOURS
Refills: 0 | Status: COMPLETED | OUTPATIENT
Start: 2024-03-28 | End: 2024-03-30

## 2024-03-28 RX ADMIN — QUETIAPINE FUMARATE 25 MILLIGRAM(S): 200 TABLET, FILM COATED ORAL at 13:29

## 2024-03-28 RX ADMIN — Medication 3 MILLIGRAM(S): at 20:05

## 2024-03-28 RX ADMIN — SENNA PLUS 2 TABLET(S): 8.6 TABLET ORAL at 21:17

## 2024-03-28 RX ADMIN — CEFTRIAXONE 100 MILLIGRAM(S): 500 INJECTION, POWDER, FOR SOLUTION INTRAMUSCULAR; INTRAVENOUS at 06:40

## 2024-03-28 RX ADMIN — POLYETHYLENE GLYCOL 3350 17 GRAM(S): 17 POWDER, FOR SOLUTION ORAL at 11:34

## 2024-03-28 RX ADMIN — QUETIAPINE FUMARATE 25 MILLIGRAM(S): 200 TABLET, FILM COATED ORAL at 05:32

## 2024-03-28 RX ADMIN — QUETIAPINE FUMARATE 75 MILLIGRAM(S): 200 TABLET, FILM COATED ORAL at 19:23

## 2024-03-28 RX ADMIN — OLANZAPINE 1.25 MILLIGRAM(S): 15 TABLET, FILM COATED ORAL at 20:03

## 2024-03-28 NOTE — PROGRESS NOTE ADULT - SUBJECTIVE AND OBJECTIVE BOX
pt seen and examined  3/28/24    MEDICATIONS  (STANDING):  cefTRIAXone   IVPB 1000 milliGRAM(s) IV Intermittent every 24 hours  melatonin 3 milliGRAM(s) Oral <User Schedule>  polyethylene glycol 3350 17 Gram(s) Oral daily  QUEtiapine 25 milliGRAM(s) Oral <User Schedule>  QUEtiapine 75 milliGRAM(s) Oral <User Schedule>  QUEtiapine 25 milliGRAM(s) Oral <User Schedule>  senna 2 Tablet(s) Oral at bedtime    MEDICATIONS  (PRN):  acetaminophen     Tablet .. 650 milliGRAM(s) Oral every 6 hours PRN Temp greater or equal to 38C (100.4F), Mild Pain (1 - 3)  artificial tears (preservative free) Ophthalmic Solution 1 Drop(s) Both EYES three times a day PRN Dry Eyes  OLANZapine Injectable 1.25 milliGRAM(s) IntraMuscular every 6 hours PRN agitation    Vital Signs Last 24 Hrs  T(C): 36.8 (03-28-24 @ 22:21), Max: 36.8 (03-28-24 @ 22:21)  T(F): 98.2 (03-28-24 @ 22:21), Max: 98.2 (03-28-24 @ 22:21)  HR: 60 (03-28-24 @ 22:21) (60 - 90)  BP: 120/56 (03-28-24 @ 22:21) (120/56 - 143/58)  BP(mean): --  RR: 18 (03-28-24 @ 22:21) (18 - 20)  SpO2: 99% (03-28-24 @ 22:21) (95% - 99%)      PHYSICAL EXAM:  GENERAL: NAD, thin elderly, comfortable on room air  HEAD:  Atraumatic, Normocephalic  EYES: EOMI, PERRLA, conjunctiva and sclera clear  NECK: Supple, No JVD  CHEST/LUNG:dec breath sounds at bases  HEART: Regular rate and rhythm; No murmurs, rubs, or gallops  ABDOMEN: Soft, Nontender, Nondistended; Bowel sounds present  Neuro: awake alert,  EXTREMITIES:  2+ Peripheral Pulses, No clubbing, cyanosis, trace b/l edema  SKIN: No rashes or lesions     LABS:        Creatinine Trend:     Urine Studies:

## 2024-03-29 LAB
ANION GAP SERPL CALC-SCNC: 10 MMOL/L — SIGNIFICANT CHANGE UP (ref 7–14)
BUN SERPL-MCNC: 24 MG/DL — HIGH (ref 7–23)
CALCIUM SERPL-MCNC: 8.7 MG/DL — SIGNIFICANT CHANGE UP (ref 8.4–10.5)
CHLORIDE SERPL-SCNC: 106 MMOL/L — SIGNIFICANT CHANGE UP (ref 98–107)
CO2 SERPL-SCNC: 25 MMOL/L — SIGNIFICANT CHANGE UP (ref 22–31)
CREAT SERPL-MCNC: 0.74 MG/DL — SIGNIFICANT CHANGE UP (ref 0.5–1.3)
EGFR: 80 ML/MIN/1.73M2 — SIGNIFICANT CHANGE UP
GLUCOSE SERPL-MCNC: 84 MG/DL — SIGNIFICANT CHANGE UP (ref 70–99)
HCT VFR BLD CALC: 35.8 % — SIGNIFICANT CHANGE UP (ref 34.5–45)
HGB BLD-MCNC: 11.7 G/DL — SIGNIFICANT CHANGE UP (ref 11.5–15.5)
MAGNESIUM SERPL-MCNC: 2 MG/DL — SIGNIFICANT CHANGE UP (ref 1.6–2.6)
MCHC RBC-ENTMCNC: 30.8 PG — SIGNIFICANT CHANGE UP (ref 27–34)
MCHC RBC-ENTMCNC: 32.7 GM/DL — SIGNIFICANT CHANGE UP (ref 32–36)
MCV RBC AUTO: 94.2 FL — SIGNIFICANT CHANGE UP (ref 80–100)
NRBC # BLD: 0 /100 WBCS — SIGNIFICANT CHANGE UP (ref 0–0)
NRBC # FLD: 0 K/UL — SIGNIFICANT CHANGE UP (ref 0–0)
PHOSPHATE SERPL-MCNC: 3.9 MG/DL — SIGNIFICANT CHANGE UP (ref 2.5–4.5)
PLATELET # BLD AUTO: 155 K/UL — SIGNIFICANT CHANGE UP (ref 150–400)
POTASSIUM SERPL-MCNC: 4 MMOL/L — SIGNIFICANT CHANGE UP (ref 3.5–5.3)
POTASSIUM SERPL-SCNC: 4 MMOL/L — SIGNIFICANT CHANGE UP (ref 3.5–5.3)
RBC # BLD: 3.8 M/UL — SIGNIFICANT CHANGE UP (ref 3.8–5.2)
RBC # FLD: 13.5 % — SIGNIFICANT CHANGE UP (ref 10.3–14.5)
SODIUM SERPL-SCNC: 141 MMOL/L — SIGNIFICANT CHANGE UP (ref 135–145)
WBC # BLD: 6.02 K/UL — SIGNIFICANT CHANGE UP (ref 3.8–10.5)
WBC # FLD AUTO: 6.02 K/UL — SIGNIFICANT CHANGE UP (ref 3.8–10.5)

## 2024-03-29 RX ORDER — QUETIAPINE FUMARATE 200 MG/1
25 TABLET, FILM COATED ORAL
Refills: 0 | Status: DISCONTINUED | OUTPATIENT
Start: 2024-03-29 | End: 2024-04-09

## 2024-03-29 RX ADMIN — QUETIAPINE FUMARATE 25 MILLIGRAM(S): 200 TABLET, FILM COATED ORAL at 13:51

## 2024-03-29 RX ADMIN — Medication 3 MILLIGRAM(S): at 22:43

## 2024-03-29 RX ADMIN — QUETIAPINE FUMARATE 25 MILLIGRAM(S): 200 TABLET, FILM COATED ORAL at 05:36

## 2024-03-29 RX ADMIN — OLANZAPINE 1.25 MILLIGRAM(S): 15 TABLET, FILM COATED ORAL at 23:35

## 2024-03-29 RX ADMIN — CEFTRIAXONE 100 MILLIGRAM(S): 500 INJECTION, POWDER, FOR SOLUTION INTRAMUSCULAR; INTRAVENOUS at 05:41

## 2024-03-29 RX ADMIN — QUETIAPINE FUMARATE 75 MILLIGRAM(S): 200 TABLET, FILM COATED ORAL at 16:34

## 2024-03-29 RX ADMIN — SENNA PLUS 2 TABLET(S): 8.6 TABLET ORAL at 22:43

## 2024-03-29 NOTE — PROGRESS NOTE ADULT - SUBJECTIVE AND OBJECTIVE BOX
pt seen and examined  3/29/24    MEDICATIONS  (STANDING):  cefTRIAXone   IVPB 1000 milliGRAM(s) IV Intermittent every 24 hours  melatonin 3 milliGRAM(s) Oral <User Schedule>  polyethylene glycol 3350 17 Gram(s) Oral daily  QUEtiapine 25 milliGRAM(s) Oral <User Schedule>  QUEtiapine 75 milliGRAM(s) Oral <User Schedule>  QUEtiapine 25 milliGRAM(s) Oral <User Schedule>  senna 2 Tablet(s) Oral at bedtime    MEDICATIONS  (PRN):  acetaminophen     Tablet .. 650 milliGRAM(s) Oral every 6 hours PRN Temp greater or equal to 38C (100.4F), Mild Pain (1 - 3)  artificial tears (preservative free) Ophthalmic Solution 1 Drop(s) Both EYES three times a day PRN Dry Eyes  OLANZapine Injectable 1.25 milliGRAM(s) IntraMuscular every 6 hours PRN agitation    Vital Signs Last 24 Hrs  T(C): 36.8 (03-28-24 @ 22:21), Max: 36.8 (03-28-24 @ 22:21)  T(F): 98.2 (03-28-24 @ 22:21), Max: 98.2 (03-28-24 @ 22:21)  HR: 60 (03-28-24 @ 22:21) (60 - 90)  BP: 120/56 (03-28-24 @ 22:21) (120/56 - 143/58)  BP(mean): --  RR: 18 (03-28-24 @ 22:21) (18 - 20)  SpO2: 99% (03-28-24 @ 22:21) (95% - 99%)      PHYSICAL EXAM:  GENERAL: NAD, thin elderly, comfortable on room air  HEAD:  Atraumatic, Normocephalic  EYES: EOMI, PERRLA, conjunctiva and sclera clear  NECK: Supple, No JVD  CHEST/LUNG:dec breath sounds at bases  HEART: Regular rate and rhythm; No murmurs, rubs, or gallops  ABDOMEN: Soft, Nontender, Nondistended; Bowel sounds present  Neuro: awake alert,  EXTREMITIES:  2+ Peripheral Pulses, No clubbing, cyanosis, trace b/l edema  SKIN: No rashes or lesions     LABS:        Creatinine Trend:     Urine Studies:

## 2024-03-30 RX ORDER — OLANZAPINE 15 MG/1
1.25 TABLET, FILM COATED ORAL ONCE
Refills: 0 | Status: COMPLETED | OUTPATIENT
Start: 2024-03-30 | End: 2024-03-30

## 2024-03-30 RX ADMIN — QUETIAPINE FUMARATE 25 MILLIGRAM(S): 200 TABLET, FILM COATED ORAL at 05:27

## 2024-03-30 RX ADMIN — Medication 3 MILLIGRAM(S): at 21:35

## 2024-03-30 RX ADMIN — OLANZAPINE 1.25 MILLIGRAM(S): 15 TABLET, FILM COATED ORAL at 12:11

## 2024-03-30 RX ADMIN — QUETIAPINE FUMARATE 75 MILLIGRAM(S): 200 TABLET, FILM COATED ORAL at 18:36

## 2024-03-30 RX ADMIN — CEFTRIAXONE 100 MILLIGRAM(S): 500 INJECTION, POWDER, FOR SOLUTION INTRAMUSCULAR; INTRAVENOUS at 05:26

## 2024-03-30 RX ADMIN — SENNA PLUS 2 TABLET(S): 8.6 TABLET ORAL at 21:35

## 2024-03-30 RX ADMIN — OLANZAPINE 1.25 MILLIGRAM(S): 15 TABLET, FILM COATED ORAL at 19:17

## 2024-03-30 RX ADMIN — QUETIAPINE FUMARATE 25 MILLIGRAM(S): 200 TABLET, FILM COATED ORAL at 13:34

## 2024-03-30 RX ADMIN — OLANZAPINE 1.25 MILLIGRAM(S): 15 TABLET, FILM COATED ORAL at 10:57

## 2024-03-30 NOTE — PROGRESS NOTE ADULT - SUBJECTIVE AND OBJECTIVE BOX
pt seen and examined  3/30/24    MEDICATIONS  (STANDING):  melatonin 3 milliGRAM(s) Oral <User Schedule>  polyethylene glycol 3350 17 Gram(s) Oral daily  QUEtiapine 75 milliGRAM(s) Oral <User Schedule>  QUEtiapine 25 milliGRAM(s) Oral <User Schedule>  QUEtiapine 25 milliGRAM(s) Oral <User Schedule>  senna 2 Tablet(s) Oral at bedtime    MEDICATIONS  (PRN):  acetaminophen     Tablet .. 650 milliGRAM(s) Oral every 6 hours PRN Temp greater or equal to 38C (100.4F), Mild Pain (1 - 3)  artificial tears (preservative free) Ophthalmic Solution 1 Drop(s) Both EYES three times a day PRN Dry Eyes  OLANZapine Injectable 1.25 milliGRAM(s) IntraMuscular every 6 hours PRN agitation    Vital Signs Last 24 Hrs  T(C): 36.2 (03-30-24 @ 21:44), Max: 36.6 (03-30-24 @ 15:05)  T(F): 97.1 (03-30-24 @ 21:44), Max: 97.8 (03-30-24 @ 15:05)  HR: 75 (03-30-24 @ 21:44) (60 - 91)  BP: 146/66 (03-30-24 @ 21:44) (126/56 - 146/66)  BP(mean): --  RR: 17 (03-30-24 @ 21:44) (17 - 18)  SpO2: 97% (03-30-24 @ 21:44) (95% - 100%)          PHYSICAL EXAM:  GENERAL: NAD, thin elderly, comfortable on room air  HEAD:  Atraumatic, Normocephalic  EYES: EOMI, PERRLA, conjunctiva and sclera clear  NECK: Supple, No JVD  CHEST/LUNG:dec breath sounds at bases  HEART: Regular rate and rhythm; No murmurs, rubs, or gallops  ABDOMEN: Soft, Nontender, Nondistended; Bowel sounds present  Neuro: awake alert,  EXTREMITIES:  2+ Peripheral Pulses, No clubbing, cyanosis, trace b/l edema  SKIN: No rashes or lesions     LABS:        Creatinine Trend:     Urine Studies:

## 2024-03-31 RX ADMIN — QUETIAPINE FUMARATE 25 MILLIGRAM(S): 200 TABLET, FILM COATED ORAL at 06:11

## 2024-03-31 RX ADMIN — SENNA PLUS 2 TABLET(S): 8.6 TABLET ORAL at 22:00

## 2024-03-31 RX ADMIN — POLYETHYLENE GLYCOL 3350 17 GRAM(S): 17 POWDER, FOR SOLUTION ORAL at 13:08

## 2024-03-31 RX ADMIN — QUETIAPINE FUMARATE 25 MILLIGRAM(S): 200 TABLET, FILM COATED ORAL at 13:08

## 2024-03-31 RX ADMIN — QUETIAPINE FUMARATE 75 MILLIGRAM(S): 200 TABLET, FILM COATED ORAL at 18:45

## 2024-03-31 RX ADMIN — Medication 3 MILLIGRAM(S): at 21:59

## 2024-03-31 NOTE — PROGRESS NOTE ADULT - SUBJECTIVE AND OBJECTIVE BOX
pt seen and examined  3/31/24    MEDICATIONS  (STANDING):  melatonin 3 milliGRAM(s) Oral <User Schedule>  polyethylene glycol 3350 17 Gram(s) Oral daily  QUEtiapine 75 milliGRAM(s) Oral <User Schedule>  QUEtiapine 25 milliGRAM(s) Oral <User Schedule>  QUEtiapine 25 milliGRAM(s) Oral <User Schedule>  senna 2 Tablet(s) Oral at bedtime    MEDICATIONS  (PRN):  acetaminophen     Tablet .. 650 milliGRAM(s) Oral every 6 hours PRN Temp greater or equal to 38C (100.4F), Mild Pain (1 - 3)  artificial tears (preservative free) Ophthalmic Solution 1 Drop(s) Both EYES three times a day PRN Dry Eyes  OLANZapine Injectable 1.25 milliGRAM(s) IntraMuscular every 6 hours PRN agitation    Vital Signs Last 24 Hrs  T(C): 36.6 (03-31-24 @ 13:35), Max: 36.6 (03-31-24 @ 13:35)  T(F): 97.9 (03-31-24 @ 13:35), Max: 97.9 (03-31-24 @ 13:35)  HR: 80 (03-31-24 @ 13:35) (70 - 91)  BP: 136/82 (03-31-24 @ 13:35) (136/82 - 154/66)  BP(mean): --  RR: 18 (03-31-24 @ 13:35) (17 - 18)  SpO2: 98% (03-31-24 @ 13:35) (95% - 98%)          PHYSICAL EXAM:  GENERAL: NAD, thin elderly, comfortable on room air  HEAD:  Atraumatic, Normocephalic  EYES: EOMI, PERRLA, conjunctiva and sclera clear  NECK: Supple, No JVD  CHEST/LUNG:dec breath sounds at bases  HEART: Regular rate and rhythm; No murmurs, rubs, or gallops  ABDOMEN: Soft, Nontender, Nondistended; Bowel sounds present  Neuro: awake alert,  EXTREMITIES:  2+ Peripheral Pulses, No clubbing, cyanosis, trace b/l edema  SKIN: No rashes or lesions     LABS:        Creatinine Trend:     Urine Studies:

## 2024-04-01 RX ADMIN — QUETIAPINE FUMARATE 25 MILLIGRAM(S): 200 TABLET, FILM COATED ORAL at 14:33

## 2024-04-01 RX ADMIN — QUETIAPINE FUMARATE 25 MILLIGRAM(S): 200 TABLET, FILM COATED ORAL at 05:39

## 2024-04-01 RX ADMIN — SENNA PLUS 2 TABLET(S): 8.6 TABLET ORAL at 22:29

## 2024-04-01 RX ADMIN — Medication 3 MILLIGRAM(S): at 22:29

## 2024-04-01 RX ADMIN — QUETIAPINE FUMARATE 75 MILLIGRAM(S): 200 TABLET, FILM COATED ORAL at 18:42

## 2024-04-01 RX ADMIN — OLANZAPINE 1.25 MILLIGRAM(S): 15 TABLET, FILM COATED ORAL at 07:26

## 2024-04-01 NOTE — PROGRESS NOTE ADULT - SUBJECTIVE AND OBJECTIVE BOX
pt seen and examined  4/1/24    MEDICATIONS  (STANDING):  melatonin 3 milliGRAM(s) Oral <User Schedule>  polyethylene glycol 3350 17 Gram(s) Oral daily  QUEtiapine 25 milliGRAM(s) Oral <User Schedule>  QUEtiapine 75 milliGRAM(s) Oral <User Schedule>  QUEtiapine 25 milliGRAM(s) Oral <User Schedule>  senna 2 Tablet(s) Oral at bedtime    MEDICATIONS  (PRN):  acetaminophen     Tablet .. 650 milliGRAM(s) Oral every 6 hours PRN Temp greater or equal to 38C (100.4F), Mild Pain (1 - 3)  artificial tears (preservative free) Ophthalmic Solution 1 Drop(s) Both EYES three times a day PRN Dry Eyes  OLANZapine Injectable 1.25 milliGRAM(s) IntraMuscular every 6 hours PRN agitation    Vital Signs Last 24 Hrs  T(C): 36.7 (04-01-24 @ 20:18), Max: 36.7 (04-01-24 @ 20:18)  T(F): 98 (04-01-24 @ 20:18), Max: 98 (04-01-24 @ 20:18)  HR: 66 (04-01-24 @ 20:18) (66 - 83)  BP: 153/64 (04-01-24 @ 20:18) (119/89 - 153/64)  BP(mean): --  RR: 17 (04-01-24 @ 20:18) (17 - 18)  SpO2: 99% (04-01-24 @ 20:18) (99% - 100%)            PHYSICAL EXAM:  GENERAL: NAD, thin elderly, comfortable on room air  HEAD:  Atraumatic, Normocephalic  EYES: EOMI, PERRLA, conjunctiva and sclera clear  NECK: Supple, No JVD  CHEST/LUNG:dec breath sounds at bases  HEART: Regular rate and rhythm; No murmurs, rubs, or gallops  ABDOMEN: Soft, Nontender, Nondistended; Bowel sounds present  Neuro: awake alert,  EXTREMITIES:  2+ Peripheral Pulses, No clubbing, cyanosis, trace b/l edema  SKIN: No rashes or lesions     LABS:        Creatinine Trend:     Urine Studies:

## 2024-04-02 RX ADMIN — SENNA PLUS 2 TABLET(S): 8.6 TABLET ORAL at 21:38

## 2024-04-02 RX ADMIN — Medication 3 MILLIGRAM(S): at 21:38

## 2024-04-02 RX ADMIN — QUETIAPINE FUMARATE 25 MILLIGRAM(S): 200 TABLET, FILM COATED ORAL at 06:54

## 2024-04-02 RX ADMIN — OLANZAPINE 1.25 MILLIGRAM(S): 15 TABLET, FILM COATED ORAL at 21:38

## 2024-04-02 RX ADMIN — POLYETHYLENE GLYCOL 3350 17 GRAM(S): 17 POWDER, FOR SOLUTION ORAL at 13:29

## 2024-04-02 RX ADMIN — QUETIAPINE FUMARATE 25 MILLIGRAM(S): 200 TABLET, FILM COATED ORAL at 13:30

## 2024-04-02 NOTE — PROGRESS NOTE ADULT - SUBJECTIVE AND OBJECTIVE BOX
pt seen and examined  4/2/24    MEDICATIONS  (STANDING):  melatonin 3 milliGRAM(s) Oral <User Schedule>  polyethylene glycol 3350 17 Gram(s) Oral daily  QUEtiapine 75 milliGRAM(s) Oral <User Schedule>  QUEtiapine 25 milliGRAM(s) Oral <User Schedule>  QUEtiapine 25 milliGRAM(s) Oral <User Schedule>  senna 2 Tablet(s) Oral at bedtime    MEDICATIONS  (PRN):  acetaminophen     Tablet .. 650 milliGRAM(s) Oral every 6 hours PRN Temp greater or equal to 38C (100.4F), Mild Pain (1 - 3)  artificial tears (preservative free) Ophthalmic Solution 1 Drop(s) Both EYES three times a day PRN Dry Eyes  OLANZapine Injectable 1.25 milliGRAM(s) IntraMuscular every 6 hours PRN agitation    Vital Signs Last 24 Hrs  T(C): 36.8 (04-02-24 @ 21:45), Max: 36.8 (04-02-24 @ 21:45)  T(F): 98.2 (04-02-24 @ 21:45), Max: 98.2 (04-02-24 @ 21:45)  HR: 83 (04-02-24 @ 21:45) (69 - 83)  BP: 146/69 (04-02-24 @ 21:45) (128/64 - 146/69)  BP(mean): --  RR: 17 (04-02-24 @ 21:45) (17 - 17)  SpO2: 99% (04-02-24 @ 21:45) (98% - 100%)            PHYSICAL EXAM:  GENERAL: NAD, thin elderly, comfortable on room air  HEAD:  Atraumatic, Normocephalic  EYES: EOMI, PERRLA, conjunctiva and sclera clear  NECK: Supple, No JVD  CHEST/LUNG:dec breath sounds at bases  HEART: Regular rate and rhythm; No murmurs, rubs, or gallops  ABDOMEN: Soft, Nontender, Nondistended; Bowel sounds present  Neuro: awake alert,  EXTREMITIES:  2+ Peripheral Pulses, No clubbing, cyanosis, trace b/l edema  SKIN: No rashes or lesions     LABS:        Creatinine Trend:     Urine Studies:

## 2024-04-03 LAB
FLUAV AG NPH QL: SIGNIFICANT CHANGE UP
FLUBV AG NPH QL: SIGNIFICANT CHANGE UP
RSV RNA NPH QL NAA+NON-PROBE: SIGNIFICANT CHANGE UP
SARS-COV-2 RNA SPEC QL NAA+PROBE: SIGNIFICANT CHANGE UP

## 2024-04-03 RX ADMIN — SENNA PLUS 2 TABLET(S): 8.6 TABLET ORAL at 21:24

## 2024-04-03 RX ADMIN — QUETIAPINE FUMARATE 25 MILLIGRAM(S): 200 TABLET, FILM COATED ORAL at 13:11

## 2024-04-03 RX ADMIN — QUETIAPINE FUMARATE 25 MILLIGRAM(S): 200 TABLET, FILM COATED ORAL at 05:39

## 2024-04-03 RX ADMIN — QUETIAPINE FUMARATE 75 MILLIGRAM(S): 200 TABLET, FILM COATED ORAL at 17:09

## 2024-04-03 RX ADMIN — Medication 3 MILLIGRAM(S): at 21:24

## 2024-04-03 NOTE — PROGRESS NOTE ADULT - SUBJECTIVE AND OBJECTIVE BOX
pt seen and examined  4/3/24    MEDICATIONS  (STANDING):  melatonin 3 milliGRAM(s) Oral <User Schedule>  polyethylene glycol 3350 17 Gram(s) Oral daily  QUEtiapine 75 milliGRAM(s) Oral <User Schedule>  QUEtiapine 25 milliGRAM(s) Oral <User Schedule>  QUEtiapine 25 milliGRAM(s) Oral <User Schedule>  senna 2 Tablet(s) Oral at bedtime    MEDICATIONS  (PRN):  acetaminophen     Tablet .. 650 milliGRAM(s) Oral every 6 hours PRN Temp greater or equal to 38C (100.4F), Mild Pain (1 - 3)  artificial tears (preservative free) Ophthalmic Solution 1 Drop(s) Both EYES three times a day PRN Dry Eyes  OLANZapine Injectable 1.25 milliGRAM(s) IntraMuscular every 6 hours PRN agitation      Vital Signs Last 24 Hrs  T(C): 36.6 (04-03-24 @ 20:19), Max: 37 (04-03-24 @ 05:10)  T(F): 97.9 (04-03-24 @ 20:19), Max: 98.6 (04-03-24 @ 05:10)  HR: 68 (04-03-24 @ 20:19) (68 - 83)  BP: 117/56 (04-03-24 @ 20:19) (117/56 - 146/69)  BP(mean): 83 (04-03-24 @ 05:10) (83 - 83)  RR: 18 (04-03-24 @ 20:19) (17 - 18)  SpO2: 97% (04-03-24 @ 20:19) (97% - 100%)    GENERAL: NAD, thin elderly, comfortable on room air  HEAD:  Atraumatic, Normocephalic  EYES: EOMI, PERRLA, conjunctiva and sclera clear  NECK: Supple, No JVD  CHEST/LUNG:dec breath sounds at bases  HEART: Regular rate and rhythm; No murmurs, rubs, or gallops  ABDOMEN: Soft, Nontender, Nondistended; Bowel sounds present  Neuro: awake alert,  EXTREMITIES:  2+ Peripheral Pulses, No clubbing, cyanosis, trace b/l edema  SKIN: No rashes or lesions     LABS:        Creatinine Trend:     Urine Studies:

## 2024-04-04 RX ADMIN — QUETIAPINE FUMARATE 75 MILLIGRAM(S): 200 TABLET, FILM COATED ORAL at 18:12

## 2024-04-04 RX ADMIN — POLYETHYLENE GLYCOL 3350 17 GRAM(S): 17 POWDER, FOR SOLUTION ORAL at 13:42

## 2024-04-04 RX ADMIN — QUETIAPINE FUMARATE 25 MILLIGRAM(S): 200 TABLET, FILM COATED ORAL at 05:02

## 2024-04-04 RX ADMIN — Medication 3 MILLIGRAM(S): at 21:27

## 2024-04-04 RX ADMIN — SENNA PLUS 2 TABLET(S): 8.6 TABLET ORAL at 21:27

## 2024-04-04 RX ADMIN — QUETIAPINE FUMARATE 25 MILLIGRAM(S): 200 TABLET, FILM COATED ORAL at 13:41

## 2024-04-04 NOTE — CHART NOTE - NSCHARTNOTEFT_GEN_A_CORE
NUTRITION FOLLOW-UP:    84 y/o F with PMH of possible dementia p/w confusion. and found to have UTI.    Pt f/u per protocol, consuming 75% meals with No GI distress (nausea/vomiting/diarrhea/constipation.) at present. Noted skin tear Rt LE, +1 edema Lt & Rt foot per nursing flow sheet. No new wts available. Request updated wts.     Weight: No updated wts, 124.5# (2/28/24)    Labs:  03-29 Na 141 mmol/L Glu 84 mg/dL K+ 4.0 mmol/L Cr 0.74 mg/dL BUN 24 mg/dL<H> Phos 3.9 mg/dL      Current Diet: Regular    Skin-  tear Rt LE, +1 edema Lt & Rt foot as per RN flow sheet    MEDICATIONS  (STANDING):  melatonin 3 milliGRAM(s) Oral <User Schedule>  polyethylene glycol 3350 17 Gram(s) Oral daily  QUEtiapine 75 milliGRAM(s) Oral <User Schedule>  QUEtiapine 25 milliGRAM(s) Oral <User Schedule>  QUEtiapine 25 milliGRAM(s) Oral <User Schedule>  senna 2 Tablet(s) Oral at bedtime    Estimated needs:  No changes since previous assessment    Nutrition Diagnosis:  Ongoing    RD to Remain Available:    Additional Recommendations:   Continue with current diet as ordered.  Monitor PO intake, weight trends, nutrition related lab values, BMs/GI distress, hydration status, skin integrity.       Kae Browne RDN #[id number]

## 2024-04-04 NOTE — PROGRESS NOTE ADULT - SUBJECTIVE AND OBJECTIVE BOX
pt seen and examined  4/4/24    MEDICATIONS  (STANDING):  melatonin 3 milliGRAM(s) Oral <User Schedule>  polyethylene glycol 3350 17 Gram(s) Oral daily  QUEtiapine 75 milliGRAM(s) Oral <User Schedule>  QUEtiapine 25 milliGRAM(s) Oral <User Schedule>  QUEtiapine 25 milliGRAM(s) Oral <User Schedule>  senna 2 Tablet(s) Oral at bedtime    MEDICATIONS  (PRN):  acetaminophen     Tablet .. 650 milliGRAM(s) Oral every 6 hours PRN Temp greater or equal to 38C (100.4F), Mild Pain (1 - 3)  artificial tears (preservative free) Ophthalmic Solution 1 Drop(s) Both EYES three times a day PRN Dry Eyes  OLANZapine Injectable 1.25 milliGRAM(s) IntraMuscular every 6 hours PRN agitation    Vital Signs Last 24 Hrs  T(C): 36.5 (04-04-24 @ 11:37), Max: 36.6 (04-03-24 @ 20:19)  T(F): 97.7 (04-04-24 @ 11:37), Max: 97.9 (04-03-24 @ 20:19)  HR: 63 (04-04-24 @ 11:37) (63 - 81)  BP: 114/53 (04-04-24 @ 11:37) (114/53 - 119/77)  BP(mean): --  RR: 17 (04-04-24 @ 11:37) (17 - 18)  SpO2: 98% (04-04-24 @ 11:37) (97% - 99%)      GENERAL: NAD, thin elderly, comfortable on room air  HEAD:  Atraumatic, Normocephalic  EYES: EOMI, PERRLA, conjunctiva and sclera clear  NECK: Supple, No JVD  CHEST/LUNG:dec breath sounds at bases  HEART: Regular rate and rhythm; No murmurs, rubs, or gallops  ABDOMEN: Soft, Nontender, Nondistended; Bowel sounds present  Neuro: awake alert,  EXTREMITIES:  2+ Peripheral Pulses, No clubbing, cyanosis, trace b/l edema  SKIN: No rashes or lesions     LABS:        Creatinine Trend:     Urine Studies:

## 2024-04-05 RX ADMIN — Medication 650 MILLIGRAM(S): at 14:11

## 2024-04-05 RX ADMIN — SENNA PLUS 2 TABLET(S): 8.6 TABLET ORAL at 22:12

## 2024-04-05 RX ADMIN — POLYETHYLENE GLYCOL 3350 17 GRAM(S): 17 POWDER, FOR SOLUTION ORAL at 11:16

## 2024-04-05 RX ADMIN — Medication 650 MILLIGRAM(S): at 13:11

## 2024-04-05 RX ADMIN — QUETIAPINE FUMARATE 25 MILLIGRAM(S): 200 TABLET, FILM COATED ORAL at 06:50

## 2024-04-05 RX ADMIN — Medication 3 MILLIGRAM(S): at 21:19

## 2024-04-05 RX ADMIN — QUETIAPINE FUMARATE 75 MILLIGRAM(S): 200 TABLET, FILM COATED ORAL at 17:31

## 2024-04-05 RX ADMIN — QUETIAPINE FUMARATE 25 MILLIGRAM(S): 200 TABLET, FILM COATED ORAL at 13:10

## 2024-04-05 NOTE — PROGRESS NOTE ADULT - SUBJECTIVE AND OBJECTIVE BOX
pt seen and examined  4/5/24    MEDICATIONS  (STANDING):  melatonin 3 milliGRAM(s) Oral <User Schedule>  polyethylene glycol 3350 17 Gram(s) Oral daily  QUEtiapine 75 milliGRAM(s) Oral <User Schedule>  QUEtiapine 25 milliGRAM(s) Oral <User Schedule>  QUEtiapine 25 milliGRAM(s) Oral <User Schedule>  senna 2 Tablet(s) Oral at bedtime    MEDICATIONS  (PRN):  acetaminophen     Tablet .. 650 milliGRAM(s) Oral every 6 hours PRN Temp greater or equal to 38C (100.4F), Mild Pain (1 - 3)  artificial tears (preservative free) Ophthalmic Solution 1 Drop(s) Both EYES three times a day PRN Dry Eyes  OLANZapine Injectable 1.25 milliGRAM(s) IntraMuscular every 6 hours PRN agitation    Vital Signs Last 24 Hrs  T(C): 36.4 (04-05-24 @ 21:15), Max: 36.7 (04-05-24 @ 05:06)  T(F): 97.6 (04-05-24 @ 21:15), Max: 98 (04-05-24 @ 05:06)  HR: 71 (04-05-24 @ 21:15) (61 - 71)  BP: 127/74 (04-05-24 @ 21:15) (127/74 - 149/55)  BP(mean): --  RR: 18 (04-05-24 @ 21:15) (18 - 18)  SpO2: 96% (04-05-24 @ 21:15) (95% - 99%)    GENERAL: NAD, thin elderly, comfortable on room air  HEAD:  Atraumatic, Normocephalic  EYES: EOMI, PERRLA, conjunctiva and sclera clear  NECK: Supple, No JVD  CHEST/LUNG:dec breath sounds at bases  HEART: Regular rate and rhythm; No murmurs, rubs, or gallops  ABDOMEN: Soft, Nontender, Nondistended; Bowel sounds present  Neuro: awake alert,  EXTREMITIES:  2+ Peripheral Pulses, No clubbing, cyanosis, trace b/l edema  SKIN: No rashes or lesions     LABS:        Creatinine Trend:     Urine Studies:

## 2024-04-06 RX ADMIN — QUETIAPINE FUMARATE 25 MILLIGRAM(S): 200 TABLET, FILM COATED ORAL at 05:50

## 2024-04-06 RX ADMIN — QUETIAPINE FUMARATE 75 MILLIGRAM(S): 200 TABLET, FILM COATED ORAL at 17:34

## 2024-04-06 RX ADMIN — SENNA PLUS 2 TABLET(S): 8.6 TABLET ORAL at 21:51

## 2024-04-06 RX ADMIN — Medication 3 MILLIGRAM(S): at 21:51

## 2024-04-06 RX ADMIN — QUETIAPINE FUMARATE 25 MILLIGRAM(S): 200 TABLET, FILM COATED ORAL at 13:41

## 2024-04-06 RX ADMIN — POLYETHYLENE GLYCOL 3350 17 GRAM(S): 17 POWDER, FOR SOLUTION ORAL at 13:41

## 2024-04-06 NOTE — PROGRESS NOTE ADULT - SUBJECTIVE AND OBJECTIVE BOX
pt seen and examined  4/6/24    MEDICATIONS  (STANDING):  melatonin 3 milliGRAM(s) Oral <User Schedule>  polyethylene glycol 3350 17 Gram(s) Oral daily  QUEtiapine 75 milliGRAM(s) Oral <User Schedule>  QUEtiapine 25 milliGRAM(s) Oral <User Schedule>  QUEtiapine 25 milliGRAM(s) Oral <User Schedule>  senna 2 Tablet(s) Oral at bedtime    MEDICATIONS  (PRN):  acetaminophen     Tablet .. 650 milliGRAM(s) Oral every 6 hours PRN Temp greater or equal to 38C (100.4F), Mild Pain (1 - 3)  artificial tears (preservative free) Ophthalmic Solution 1 Drop(s) Both EYES three times a day PRN Dry Eyes  OLANZapine Injectable 1.25 milliGRAM(s) IntraMuscular every 6 hours PRN agitation    Vital Signs Last 24 Hrs  T(C): 36.7 (04-06-24 @ 19:44), Max: 36.7 (04-06-24 @ 04:54)  T(F): 98.1 (04-06-24 @ 19:44), Max: 98.1 (04-06-24 @ 19:44)  HR: 73 (04-06-24 @ 19:44) (73 - 89)  BP: 124/70 (04-06-24 @ 19:44) (107/83 - 124/70)  BP(mean): --  RR: 18 (04-06-24 @ 19:44) (17 - 18)  SpO2: 100% (04-06-24 @ 19:44) (100% - 100%)    GENERAL: NAD, thin elderly, comfortable on room air  HEAD:  Atraumatic, Normocephalic  EYES: EOMI, PERRLA, conjunctiva and sclera clear  NECK: Supple, No JVD  CHEST/LUNG:dec breath sounds at bases  HEART: Regular rate and rhythm; No murmurs, rubs, or gallops  ABDOMEN: Soft, Nontender, Nondistended; Bowel sounds present  Neuro: awake alert,  EXTREMITIES:  2+ Peripheral Pulses, No clubbing, cyanosis, trace b/l edema  SKIN: No rashes or lesions     LABS:        Creatinine Trend:     Urine Studies:

## 2024-04-07 RX ADMIN — QUETIAPINE FUMARATE 75 MILLIGRAM(S): 200 TABLET, FILM COATED ORAL at 16:54

## 2024-04-07 RX ADMIN — QUETIAPINE FUMARATE 25 MILLIGRAM(S): 200 TABLET, FILM COATED ORAL at 06:00

## 2024-04-07 RX ADMIN — POLYETHYLENE GLYCOL 3350 17 GRAM(S): 17 POWDER, FOR SOLUTION ORAL at 11:40

## 2024-04-07 RX ADMIN — QUETIAPINE FUMARATE 25 MILLIGRAM(S): 200 TABLET, FILM COATED ORAL at 12:48

## 2024-04-07 RX ADMIN — Medication 3 MILLIGRAM(S): at 19:49

## 2024-04-07 RX ADMIN — OLANZAPINE 1.25 MILLIGRAM(S): 15 TABLET, FILM COATED ORAL at 15:15

## 2024-04-07 RX ADMIN — SENNA PLUS 2 TABLET(S): 8.6 TABLET ORAL at 22:02

## 2024-04-07 NOTE — PROGRESS NOTE ADULT - SUBJECTIVE AND OBJECTIVE BOX
pt seen and examined  4/7/24    MEDICATIONS  (STANDING):  melatonin 3 milliGRAM(s) Oral <User Schedule>  polyethylene glycol 3350 17 Gram(s) Oral daily  QUEtiapine 75 milliGRAM(s) Oral <User Schedule>  QUEtiapine 25 milliGRAM(s) Oral <User Schedule>  QUEtiapine 25 milliGRAM(s) Oral <User Schedule>  senna 2 Tablet(s) Oral at bedtime    MEDICATIONS  (PRN):  acetaminophen     Tablet .. 650 milliGRAM(s) Oral every 6 hours PRN Temp greater or equal to 38C (100.4F), Mild Pain (1 - 3)  artificial tears (preservative free) Ophthalmic Solution 1 Drop(s) Both EYES three times a day PRN Dry Eyes  OLANZapine Injectable 1.25 milliGRAM(s) IntraMuscular every 6 hours PRN agitation    Vital Signs Last 24 Hrs  T(C): 36.5 (04-07-24 @ 20:59), Max: 36.7 (04-07-24 @ 17:00)  T(F): 97.7 (04-07-24 @ 20:59), Max: 98 (04-07-24 @ 17:00)  HR: 66 (04-07-24 @ 20:59) (56 - 66)  BP: 128/66 (04-07-24 @ 20:59) (119/78 - 128/66)  BP(mean): 89 (04-07-24 @ 17:00) (89 - 89)  RR: 18 (04-07-24 @ 20:59) (17 - 18)  SpO2: 100% (04-07-24 @ 20:59) (95% - 100%)      GENERAL: NAD, thin elderly, comfortable on room air  HEAD:  Atraumatic, Normocephalic  EYES: EOMI, PERRLA, conjunctiva and sclera clear  NECK: Supple, No JVD  CHEST/LUNG:dec breath sounds at bases  HEART: Regular rate and rhythm; No murmurs, rubs, or gallops  ABDOMEN: Soft, Nontender, Nondistended; Bowel sounds present  Neuro: awake alert,  EXTREMITIES:  2+ Peripheral Pulses, No clubbing, cyanosis, trace b/l edema  SKIN: No rashes or lesions     LABS:        Creatinine Trend:     Urine Studies:

## 2024-04-08 PROCEDURE — 99231 SBSQ HOSP IP/OBS SF/LOW 25: CPT

## 2024-04-08 RX ORDER — QUETIAPINE FUMARATE 200 MG/1
25 TABLET, FILM COATED ORAL ONCE
Refills: 0 | Status: COMPLETED | OUTPATIENT
Start: 2024-04-08 | End: 2024-04-08

## 2024-04-08 RX ORDER — QUETIAPINE FUMARATE 200 MG/1
50 TABLET, FILM COATED ORAL
Refills: 0 | Status: DISCONTINUED | OUTPATIENT
Start: 2024-04-09 | End: 2024-04-09

## 2024-04-08 RX ADMIN — QUETIAPINE FUMARATE 25 MILLIGRAM(S): 200 TABLET, FILM COATED ORAL at 09:41

## 2024-04-08 RX ADMIN — POLYETHYLENE GLYCOL 3350 17 GRAM(S): 17 POWDER, FOR SOLUTION ORAL at 15:03

## 2024-04-08 RX ADMIN — QUETIAPINE FUMARATE 25 MILLIGRAM(S): 200 TABLET, FILM COATED ORAL at 05:17

## 2024-04-08 RX ADMIN — QUETIAPINE FUMARATE 25 MILLIGRAM(S): 200 TABLET, FILM COATED ORAL at 13:42

## 2024-04-08 NOTE — BH CONSULTATION LIAISON PROGRESS NOTE - NSBHFUPINTERVALHXFT_PSY_A_CORE
Met with patient. Confused, disoriented. Confabulations. Believes- ' the bus is not coming. How do you tolerate such unprofessionalism?'. No evident si or hi, no psychosis.

## 2024-04-08 NOTE — BH CONSULTATION LIAISON PROGRESS NOTE - NSBHASSESSMENTFT_PSY_ALL_CORE
84 y/o F with PMH of possible dementia p/w confusion.  Pt was found by bystander (who is a nurse) wandering by a local grocery store and appeared confused.  Bystander brought pt to her home and noted pt's home was in a poor state, and did not seem safe for pt to live alone.  Pt brought to ED by EMS.     On evaluation patient is alert, oriented X1, calm but confused, needed a lot of redirection but was not agitated. in the ED patient found to have UTI and antibiotic was started. with no know past psychiatric history and her history of infection it is most likely to be delirium with underlying dementia. currently patient is calm, did not need any PRNs for the last 2 days.     1/5: Patient orientedX1, confused, not sleeping and getting agitated at night where she requires PRNs.   1/8: Patient is oriented x1. better sleep and no PRNS. Plan to continue Seroquel 25 mg at 8 pm.   1/24: Patient is confused, received Olanzapine PRN PO X1 for agitation in the last 24 hours. Can increase Seroquel to 50 mg at 8 pm.   1/31: Patient is confused, intermittently agitated receiving PRNs, Patient often get agitated due to confusion in the morning and would benefit from a small dose of Seroquel in the morning as it has been working well.   2/16: Received IM olanzapine and haloperidol overnight, likely in setting of sundowning/confusion. Reporting anxiety today. Increase quetiapine afternoon dose of seroquel (from 12.5mg to 25mg).   2/20: much improved, no recent PRNs. Calm, pleasant, poor insight (baseline).    4/8--- intermittent agitation    Plan:   - Routine observation as pt denies SI/HI  - Does not have capacity to participate in safe discharge planning   - PRNs: For agitation, can use Olanzapine 1.25 mg PO/IM PRN Q6hr.   - INCREASE DOSE OF quetiapine to 50 mg at 6:00, 25 mg at 14:00, and 75mg at 20:00 for mood, anxiety, and insomnia.  - Medical management per primary team   - Hold antipsychotics if QTc > 500 ms    Dispo: No role for inpatient psychiatry admission, but patient would benefit from higher level of care like a nursing home.

## 2024-04-08 NOTE — PROGRESS NOTE ADULT - SUBJECTIVE AND OBJECTIVE BOX
pt seen and examined  4/8/24    MEDICATIONS  (STANDING):  melatonin 3 milliGRAM(s) Oral <User Schedule>  polyethylene glycol 3350 17 Gram(s) Oral daily  QUEtiapine 75 milliGRAM(s) Oral <User Schedule>  QUEtiapine 25 milliGRAM(s) Oral <User Schedule>  senna 2 Tablet(s) Oral at bedtime    MEDICATIONS  (PRN):  acetaminophen     Tablet .. 650 milliGRAM(s) Oral every 6 hours PRN Temp greater or equal to 38C (100.4F), Mild Pain (1 - 3)  artificial tears (preservative free) Ophthalmic Solution 1 Drop(s) Both EYES three times a day PRN Dry Eyes  OLANZapine Injectable 1.25 milliGRAM(s) IntraMuscular every 6 hours PRN agitation    Vital Signs Last 24 Hrs  T(C): 36.4 (04-08-24 @ 20:55), Max: 36.4 (04-08-24 @ 04:56)  T(F): 97.6 (04-08-24 @ 20:55), Max: 97.6 (04-08-24 @ 04:56)  HR: 91 (04-08-24 @ 20:55) (64 - 91)  BP: 136/78 (04-08-24 @ 20:55) (129/85 - 136/78)  BP(mean): --  RR: 18 (04-08-24 @ 20:55) (18 - 18)  SpO2: 97% (04-08-24 @ 20:55) (97% - 98%)    [id number]    GENERAL: NAD, thin elderly, comfortable on room air  HEAD:  Atraumatic, Normocephalic  EYES: EOMI, PERRLA, conjunctiva and sclera clear  NECK: Supple, No JVD  CHEST/LUNG:dec breath sounds at bases  HEART: Regular rate and rhythm; No murmurs, rubs, or gallops  ABDOMEN: Soft, Nontender, Nondistended; Bowel sounds present  Neuro: awake alert,  EXTREMITIES:  2+ Peripheral Pulses, No clubbing, cyanosis, trace b/l edema  SKIN: No rashes or lesions     LABS:        Creatinine Trend:     Urine Studies:

## 2024-04-09 PROCEDURE — 99232 SBSQ HOSP IP/OBS MODERATE 35: CPT

## 2024-04-09 RX ORDER — RISPERIDONE 4 MG/1
0.5 TABLET ORAL
Refills: 0 | Status: DISCONTINUED | OUTPATIENT
Start: 2024-04-09 | End: 2024-04-23

## 2024-04-09 RX ORDER — HALOPERIDOL DECANOATE 100 MG/ML
2.5 INJECTION INTRAMUSCULAR ONCE
Refills: 0 | Status: COMPLETED | OUTPATIENT
Start: 2024-04-09 | End: 2024-04-14

## 2024-04-09 RX ADMIN — QUETIAPINE FUMARATE 25 MILLIGRAM(S): 200 TABLET, FILM COATED ORAL at 13:25

## 2024-04-09 RX ADMIN — POLYETHYLENE GLYCOL 3350 17 GRAM(S): 17 POWDER, FOR SOLUTION ORAL at 13:24

## 2024-04-09 RX ADMIN — OLANZAPINE 1.25 MILLIGRAM(S): 15 TABLET, FILM COATED ORAL at 10:09

## 2024-04-09 RX ADMIN — SENNA PLUS 2 TABLET(S): 8.6 TABLET ORAL at 21:25

## 2024-04-09 RX ADMIN — Medication 3 MILLIGRAM(S): at 21:25

## 2024-04-09 RX ADMIN — QUETIAPINE FUMARATE 50 MILLIGRAM(S): 200 TABLET, FILM COATED ORAL at 06:15

## 2024-04-09 RX ADMIN — RISPERIDONE 0.5 MILLIGRAM(S): 4 TABLET ORAL at 17:17

## 2024-04-09 NOTE — PROGRESS NOTE ADULT - SUBJECTIVE AND OBJECTIVE BOX
pt seen and examined  4/9/24    MEDICATIONS  (STANDING):  melatonin 3 milliGRAM(s) Oral <User Schedule>  polyethylene glycol 3350 17 Gram(s) Oral daily  risperiDONE   Tablet 0.5 milliGRAM(s) Oral <User Schedule>  senna 2 Tablet(s) Oral at bedtime    MEDICATIONS  (PRN):  acetaminophen     Tablet .. 650 milliGRAM(s) Oral every 6 hours PRN Temp greater or equal to 38C (100.4F), Mild Pain (1 - 3)  artificial tears (preservative free) Ophthalmic Solution 1 Drop(s) Both EYES three times a day PRN Dry Eyes  haloperidol    Injectable 2.5 milliGRAM(s) IntraMuscular once PRN Agitation  OLANZapine Injectable 1.25 milliGRAM(s) IntraMuscular every 6 hours PRN agitation    Vital Signs Last 24 Hrs  T(C): 36.3 (04-09-24 @ 19:54), Max: 37.1 (04-09-24 @ 14:57)  T(F): 97.4 (04-09-24 @ 19:54), Max: 98.8 (04-09-24 @ 14:57)  HR: 82 (04-09-24 @ 19:54) (68 - 98)  BP: 137/56 (04-09-24 @ 19:54) (124/83 - 137/56)  BP(mean): --  RR: 18 (04-09-24 @ 19:54) (18 - 18)  SpO2: 100% (04-09-24 @ 19:54) (97% - 100%)      GENERAL: NAD, thin elderly, comfortable on room air  HEAD:  Atraumatic, Normocephalic  EYES: EOMI, PERRLA, conjunctiva and sclera clear  NECK: Supple, No JVD  CHEST/LUNG:dec breath sounds at bases  HEART: Regular rate and rhythm; No murmurs, rubs, or gallops  ABDOMEN: Soft, Nontender, Nondistended; Bowel sounds present  Neuro: awake alert,  EXTREMITIES:  2+ Peripheral Pulses, No clubbing, cyanosis, trace b/l edema  SKIN: No rashes or lesions     LABS:        Creatinine Trend:     Urine Studies:

## 2024-04-09 NOTE — BH CONSULTATION LIAISON PROGRESS NOTE - NSBHFUPINTERVALHXFT_PSY_A_CORE
patient AOx1, recall poor, attention poor, perseverates on feeling lonely despite being in open room speaking to staff, immediately forgets having spoken to staff after her conversations, carrying around doll in a bag

## 2024-04-09 NOTE — BH CONSULTATION LIAISON PROGRESS NOTE - NSBHASSESSMENTFT_PSY_ALL_CORE
84 y/o F with PMH of possible dementia p/w confusion.  Pt was found by bystander (who is a nurse) wandering by a local grocery store and appeared confused.  Bystander brought pt to her home and noted pt's home was in a poor state, and did not seem safe for pt to live alone.  Pt brought to ED by EMS.     On evaluation patient is alert, oriented X1, calm but confused, needed a lot of redirection but was not agitated. in the ED patient found to have UTI and antibiotic was started. with no know past psychiatric history and her history of infection it is most likely to be delirium with underlying dementia. currently patient is calm, did not need any PRNs for the last 2 days.     1/5: Patient orientedX1, confused, not sleeping and getting agitated at night where she requires PRNs.   1/8: Patient is oriented x1. better sleep and no PRNS. Plan to continue Seroquel 25 mg at 8 pm.   1/24: Patient is confused, received Olanzapine PRN PO X1 for agitation in the last 24 hours. Can increase Seroquel to 50 mg at 8 pm.   1/31: Patient is confused, intermittently agitated receiving PRNs, Patient often get agitated due to confusion in the morning and would benefit from a small dose of Seroquel in the morning as it has been working well.   2/16: Received IM olanzapine and haloperidol overnight, likely in setting of sundowning/confusion. Reporting anxiety today. Increase quetiapine afternoon dose of seroquel (from 12.5mg to 25mg).   2/20: much improved, no recent PRNs. Calm, pleasant, poor insight (baseline).  4/8--- intermittent agitation  4/9--- agitated, still waiting for bus, cognition poor, switch to risperdal standing and haldol PRNs, no EPS on exam currently     Plan:   - Routine observation as pt denies SI/HI  - Does not have capacity to participate in safe discharge planning   - PRNs: For agitation, can use Haldol 2.5mg q6h PRN, if still agitated after can give Ativan 0.5mg IM/IV with haldol (hold for resp. depression)  - STOP Quetiapine  - BEGIN Risperdal 0.5mg BID PO at 7am + 5pm   - Medical management per primary team   - Hold antipsychotics if QTc > 500 ms    Dispo: No role for inpatient psychiatry admission, but patient would benefit from higher level of care like a nursing home.

## 2024-04-10 RX ADMIN — Medication 3 MILLIGRAM(S): at 21:05

## 2024-04-10 RX ADMIN — RISPERIDONE 0.5 MILLIGRAM(S): 4 TABLET ORAL at 05:11

## 2024-04-10 RX ADMIN — RISPERIDONE 0.5 MILLIGRAM(S): 4 TABLET ORAL at 18:00

## 2024-04-10 RX ADMIN — SENNA PLUS 2 TABLET(S): 8.6 TABLET ORAL at 21:05

## 2024-04-10 NOTE — PROGRESS NOTE ADULT - SUBJECTIVE AND OBJECTIVE BOX
pt seen and examined  4/10/24    MEDICATIONS  (STANDING):  melatonin 3 milliGRAM(s) Oral <User Schedule>  polyethylene glycol 3350 17 Gram(s) Oral daily  risperiDONE   Tablet 0.5 milliGRAM(s) Oral <User Schedule>  senna 2 Tablet(s) Oral at bedtime    MEDICATIONS  (PRN):  acetaminophen     Tablet .. 650 milliGRAM(s) Oral every 6 hours PRN Temp greater or equal to 38C (100.4F), Mild Pain (1 - 3)  artificial tears (preservative free) Ophthalmic Solution 1 Drop(s) Both EYES three times a day PRN Dry Eyes  haloperidol    Injectable 2.5 milliGRAM(s) IntraMuscular once PRN Agitation  OLANZapine Injectable 1.25 milliGRAM(s) IntraMuscular every 6 hours PRN agitation    Vital Signs Last 24 Hrs  T(C): 36.2 (04-10-24 @ 22:12), Max: 37.1 (04-10-24 @ 12:47)  T(F): 97.1 (04-10-24 @ 22:12), Max: 98.7 (04-10-24 @ 12:47)  HR: 78 (04-10-24 @ 22:12) (72 - 88)  BP: 125/53 (04-10-24 @ 22:12) (125/53 - 140/59)  BP(mean): --  RR: 17 (04-10-24 @ 22:12) (17 - 18)  SpO2: 100% (04-10-24 @ 22:12) (100% - 100%)        GENERAL: NAD, thin elderly, comfortable on room air  HEAD:  Atraumatic, Normocephalic  EYES: EOMI, PERRLA, conjunctiva and sclera clear  NECK: Supple, No JVD  CHEST/LUNG:dec breath sounds at bases  HEART: Regular rate and rhythm; No murmurs, rubs, or gallops  ABDOMEN: Soft, Nontender, Nondistended; Bowel sounds present  Neuro: awake alert,  EXTREMITIES:  2+ Peripheral Pulses, No clubbing, cyanosis, trace b/l edema  SKIN: No rashes or lesions     LABS:        Creatinine Trend:     Urine Studies:

## 2024-04-11 RX ADMIN — SENNA PLUS 2 TABLET(S): 8.6 TABLET ORAL at 22:15

## 2024-04-11 RX ADMIN — RISPERIDONE 0.5 MILLIGRAM(S): 4 TABLET ORAL at 17:40

## 2024-04-11 RX ADMIN — RISPERIDONE 0.5 MILLIGRAM(S): 4 TABLET ORAL at 06:39

## 2024-04-11 RX ADMIN — Medication 3 MILLIGRAM(S): at 22:15

## 2024-04-11 NOTE — PROGRESS NOTE ADULT - SUBJECTIVE AND OBJECTIVE BOX
pt seen and examined  4/11/24    MEDICATIONS  (STANDING):  melatonin 3 milliGRAM(s) Oral <User Schedule>  polyethylene glycol 3350 17 Gram(s) Oral daily  risperiDONE   Tablet 0.5 milliGRAM(s) Oral <User Schedule>  senna 2 Tablet(s) Oral at bedtime    MEDICATIONS  (PRN):  acetaminophen     Tablet .. 650 milliGRAM(s) Oral every 6 hours PRN Temp greater or equal to 38C (100.4F), Mild Pain (1 - 3)  artificial tears (preservative free) Ophthalmic Solution 1 Drop(s) Both EYES three times a day PRN Dry Eyes  haloperidol    Injectable 2.5 milliGRAM(s) IntraMuscular once PRN Agitation  OLANZapine Injectable 1.25 milliGRAM(s) IntraMuscular every 6 hours PRN agitation    Vital Signs Last 24 Hrs  T(C): 36.6 (04-11-24 @ 22:18), Max: 36.6 (04-11-24 @ 05:32)  T(F): 97.9 (04-11-24 @ 22:18), Max: 97.9 (04-11-24 @ 05:32)  HR: 65 (04-11-24 @ 22:18) (65 - 97)  BP: 127/59 (04-11-24 @ 22:18) (126/60 - 148/62)  BP(mean): --  RR: 18 (04-11-24 @ 22:18) (17 - 18)  SpO2: 100% (04-11-24 @ 22:18) (98% - 100%)          GENERAL: NAD, thin elderly, comfortable on room air  HEAD:  Atraumatic, Normocephalic  EYES: EOMI, PERRLA, conjunctiva and sclera clear  NECK: Supple, No JVD  CHEST/LUNG:dec breath sounds at bases  HEART: Regular rate and rhythm; No murmurs, rubs, or gallops  ABDOMEN: Soft, Nontender, Nondistended; Bowel sounds present  Neuro: awake alert,  EXTREMITIES:  2+ Peripheral Pulses, No clubbing, cyanosis, trace b/l edema  SKIN: No rashes or lesions     LABS:        Creatinine Trend:     Urine Studies:

## 2024-04-12 RX ORDER — ACETAMINOPHEN 500 MG
650 TABLET ORAL EVERY 6 HOURS
Refills: 0 | Status: DISCONTINUED | OUTPATIENT
Start: 2024-04-12 | End: 2024-04-30

## 2024-04-12 RX ORDER — OLANZAPINE 15 MG/1
1.25 TABLET, FILM COATED ORAL EVERY 6 HOURS
Refills: 0 | Status: DISCONTINUED | OUTPATIENT
Start: 2024-04-12 | End: 2024-04-16

## 2024-04-12 RX ADMIN — RISPERIDONE 0.5 MILLIGRAM(S): 4 TABLET ORAL at 17:59

## 2024-04-12 RX ADMIN — RISPERIDONE 0.5 MILLIGRAM(S): 4 TABLET ORAL at 05:41

## 2024-04-12 NOTE — OCCUPATIONAL THERAPY INITIAL EVALUATION ADULT - ADDITIONAL COMMENTS
Patient unable to provide social history due to cognitive status. Per chart review, patient lives alone, however, patient is unable to care for herself and is not safe to live alone. Patient admitted to assist with safe discharge planning. Abnormal Fetal Heart Rate Category II

## 2024-04-12 NOTE — OCCUPATIONAL THERAPY INITIAL EVALUATION ADULT - NSOTDISCHREC_GEN_A_CORE
Home with no OT needs. Patient appears to be at baseline for ADLs and functional mobility. Patient will not be placed on OT program. Please reconsult this discipline with any changes. Patient needs assistance/supervision with ADLs for safety.

## 2024-04-12 NOTE — PHYSICAL THERAPY INITIAL EVALUATION ADULT - ACTIVE RANGE OF MOTION EXAMINATION, REHAB EVAL
18 Hour(s) 48 Minute(s) shireen. upper extremity Active ROM was WNL (within normal limits)/bilateral lower extremity Active ROM was WNL (within normal limits)

## 2024-04-12 NOTE — PROGRESS NOTE ADULT - SUBJECTIVE AND OBJECTIVE BOX
pt seen and examined  4/12/24    MEDICATIONS  (STANDING):  melatonin 3 milliGRAM(s) Oral <User Schedule>  polyethylene glycol 3350 17 Gram(s) Oral daily  risperiDONE   Tablet 0.5 milliGRAM(s) Oral <User Schedule>  senna 2 Tablet(s) Oral at bedtime    MEDICATIONS  (PRN):  acetaminophen     Tablet .. 650 milliGRAM(s) Oral every 6 hours PRN Temp greater or equal to 38C (100.4F), Mild Pain (1 - 3)  artificial tears (preservative free) Ophthalmic Solution 1 Drop(s) Both EYES three times a day PRN Dry Eyes  haloperidol    Injectable 2.5 milliGRAM(s) IntraMuscular once PRN Agitation  OLANZapine Injectable 1.25 milliGRAM(s) IntraMuscular every 6 hours PRN agitation    Vital Signs Last 24 Hrs  T(C): 36.5 (04-12-24 @ 19:47), Max: 36.9 (04-12-24 @ 05:33)  T(F): 97.7 (04-12-24 @ 19:47), Max: 98.4 (04-12-24 @ 05:33)  HR: 65 (04-12-24 @ 19:47) (65 - 68)  BP: 100/65 (04-12-24 @ 19:47) (100/65 - 125/58)  BP(mean): --  RR: 17 (04-12-24 @ 19:47) (17 - 18)  SpO2: 100% (04-12-24 @ 19:47) (99% - 100%)          GENERAL: NAD, thin elderly, comfortable on room air  HEAD:  Atraumatic, Normocephalic  EYES: EOMI, PERRLA, conjunctiva and sclera clear  NECK: Supple, No JVD  CHEST/LUNG:dec breath sounds at bases  HEART: Regular rate and rhythm; No murmurs, rubs, or gallops  ABDOMEN: Soft, Nontender, Nondistended; Bowel sounds present  Neuro: awake alert,  EXTREMITIES:  2+ Peripheral Pulses, No clubbing, cyanosis, trace b/l edema  SKIN: No rashes or lesions     LABS:        Creatinine Trend:     Urine Studies:

## 2024-04-12 NOTE — PHYSICAL THERAPY INITIAL EVALUATION ADULT - ADDITIONAL COMMENTS
Unable to attain social secondary to pt mental status; please refer to case management. Unable to attain social secondary to pt mental status; please refer to case management.    Pt left seated in chair in hallway in NAD, all lines intact, call bell in reach, SPO2 100%, and RN aware.

## 2024-04-12 NOTE — OCCUPATIONAL THERAPY INITIAL EVALUATION ADULT - GENERAL OBSERVATIONS, REHAB EVAL
Patient received seated in bedside chair in hallway in [facility]; agreeable to participate in OT evaluation. SpO2 100%.

## 2024-04-12 NOTE — OCCUPATIONAL THERAPY INITIAL EVALUATION ADULT - PERTINENT HX OF CURRENT PROBLEM, REHAB EVAL
83 year old female with history of possible dementia presenting with confusion. and found to have UTI.

## 2024-04-13 RX ADMIN — SENNA PLUS 2 TABLET(S): 8.6 TABLET ORAL at 21:01

## 2024-04-13 RX ADMIN — RISPERIDONE 0.5 MILLIGRAM(S): 4 TABLET ORAL at 17:17

## 2024-04-13 RX ADMIN — RISPERIDONE 0.5 MILLIGRAM(S): 4 TABLET ORAL at 05:05

## 2024-04-13 RX ADMIN — Medication 3 MILLIGRAM(S): at 21:01

## 2024-04-13 NOTE — PROGRESS NOTE ADULT - SUBJECTIVE AND OBJECTIVE BOX
pt seen and examined  4/13/24    MEDICATIONS  (STANDING):  melatonin 3 milliGRAM(s) Oral <User Schedule>  polyethylene glycol 3350 17 Gram(s) Oral daily  risperiDONE   Tablet 0.5 milliGRAM(s) Oral <User Schedule>  senna 2 Tablet(s) Oral at bedtime    MEDICATIONS  (PRN):  acetaminophen     Tablet .. 650 milliGRAM(s) Oral every 6 hours PRN Temp greater or equal to 38C (100.4F), Mild Pain (1 - 3)  artificial tears (preservative free) Ophthalmic Solution 1 Drop(s) Both EYES three times a day PRN Dry Eyes  haloperidol    Injectable 2.5 milliGRAM(s) IntraMuscular once PRN Agitation  OLANZapine Injectable 1.25 milliGRAM(s) IntraMuscular every 6 hours PRN agitation    Vital Signs Last 24 Hrs  T(C): 36.8 (04-13-24 @ 11:05), Max: 36.8 (04-13-24 @ 11:05)  T(F): 98.2 (04-13-24 @ 11:05), Max: 98.2 (04-13-24 @ 11:05)  HR: 67 (04-13-24 @ 11:05) (65 - 67)  BP: 144/88 (04-13-24 @ 11:05) (100/65 - 144/88)  BP(mean): --  RR: 18 (04-13-24 @ 11:05) (17 - 18)  SpO2: 100% (04-13-24 @ 11:05) (100% - 100%)            GENERAL: NAD, thin elderly, comfortable on room air  HEAD:  Atraumatic, Normocephalic  EYES: EOMI, PERRLA, conjunctiva and sclera clear  NECK: Supple, No JVD  CHEST/LUNG:dec breath sounds at bases  HEART: Regular rate and rhythm; No murmurs, rubs, or gallops  ABDOMEN: Soft, Nontender, Nondistended; Bowel sounds present  Neuro: awake alert,  EXTREMITIES:  2+ Peripheral Pulses, No clubbing, cyanosis, trace b/l edema  SKIN: No rashes or lesions     LABS:        Creatinine Trend:     Urine Studies:

## 2024-04-14 RX ADMIN — POLYETHYLENE GLYCOL 3350 17 GRAM(S): 17 POWDER, FOR SOLUTION ORAL at 12:24

## 2024-04-14 RX ADMIN — Medication 3 MILLIGRAM(S): at 20:38

## 2024-04-14 RX ADMIN — RISPERIDONE 0.5 MILLIGRAM(S): 4 TABLET ORAL at 05:55

## 2024-04-14 RX ADMIN — RISPERIDONE 0.5 MILLIGRAM(S): 4 TABLET ORAL at 17:37

## 2024-04-14 RX ADMIN — HALOPERIDOL DECANOATE 2.5 MILLIGRAM(S): 100 INJECTION INTRAMUSCULAR at 20:44

## 2024-04-14 RX ADMIN — SENNA PLUS 2 TABLET(S): 8.6 TABLET ORAL at 20:37

## 2024-04-14 NOTE — PROGRESS NOTE ADULT - SUBJECTIVE AND OBJECTIVE BOX
pt seen and examined  4/14/24    MEDICATIONS  (STANDING):  melatonin 3 milliGRAM(s) Oral <User Schedule>  polyethylene glycol 3350 17 Gram(s) Oral daily  risperiDONE   Tablet 0.5 milliGRAM(s) Oral <User Schedule>  senna 2 Tablet(s) Oral at bedtime    MEDICATIONS  (PRN):  acetaminophen     Tablet .. 650 milliGRAM(s) Oral every 6 hours PRN Temp greater or equal to 38C (100.4F), Mild Pain (1 - 3)  artificial tears (preservative free) Ophthalmic Solution 1 Drop(s) Both EYES three times a day PRN Dry Eyes  haloperidol    Injectable 2.5 milliGRAM(s) IntraMuscular once PRN Agitation  OLANZapine Injectable 1.25 milliGRAM(s) IntraMuscular every 6 hours PRN agitation    Vital Signs Last 24 Hrs  T(C): 36.7 (04-14-24 @ 05:43), Max: 36.7 (04-14-24 @ 05:43)  T(F): 98 (04-14-24 @ 05:43), Max: 98 (04-14-24 @ 05:43)  HR: 71 (04-14-24 @ 05:43) (67 - 71)  BP: 148/90 (04-14-24 @ 05:43) (148/90 - 150/64)  BP(mean): --  RR: 18 (04-14-24 @ 05:43) (18 - 18)  SpO2: 100% (04-14-24 @ 05:43) (98% - 100%)              GENERAL: NAD, thin elderly, comfortable on room air  HEAD:  Atraumatic, Normocephalic  EYES: EOMI, PERRLA, conjunctiva and sclera clear  NECK: Supple, No JVD  CHEST/LUNG:dec breath sounds at bases  HEART: Regular rate and rhythm; No murmurs, rubs, or gallops  ABDOMEN: Soft, Nontender, Nondistended; Bowel sounds present  Neuro: awake alert,  EXTREMITIES:  2+ Peripheral Pulses, No clubbing, cyanosis, trace b/l edema  SKIN: No rashes or lesions     LABS:        Creatinine Trend:     Urine Studies:

## 2024-04-15 RX ADMIN — RISPERIDONE 0.5 MILLIGRAM(S): 4 TABLET ORAL at 17:14

## 2024-04-15 RX ADMIN — SENNA PLUS 2 TABLET(S): 8.6 TABLET ORAL at 21:26

## 2024-04-15 RX ADMIN — Medication 3 MILLIGRAM(S): at 21:26

## 2024-04-15 RX ADMIN — RISPERIDONE 0.5 MILLIGRAM(S): 4 TABLET ORAL at 05:54

## 2024-04-15 NOTE — PROGRESS NOTE ADULT - SUBJECTIVE AND OBJECTIVE BOX
pt seen and examined  4/15/24    MEDICATIONS  (STANDING):  melatonin 3 milliGRAM(s) Oral <User Schedule>  polyethylene glycol 3350 17 Gram(s) Oral daily  risperiDONE   Tablet 0.5 milliGRAM(s) Oral <User Schedule>  senna 2 Tablet(s) Oral at bedtime    MEDICATIONS  (PRN):  acetaminophen     Tablet .. 650 milliGRAM(s) Oral every 6 hours PRN Temp greater or equal to 38C (100.4F), Mild Pain (1 - 3)  artificial tears (preservative free) Ophthalmic Solution 1 Drop(s) Both EYES three times a day PRN Dry Eyes  OLANZapine Injectable 1.25 milliGRAM(s) IntraMuscular every 6 hours PRN agitation    Vital Signs Last 24 Hrs  T(C): 36.3 (04-15-24 @ 22:22), Max: 36.7 (04-15-24 @ 11:46)  T(F): 97.4 (04-15-24 @ 22:22), Max: 98.1 (04-15-24 @ 11:46)  HR: 65 (04-15-24 @ 22:22) (65 - 76)  BP: 123/62 (04-15-24 @ 22:22) (123/62 - 151/65)  BP(mean): --  RR: 18 (04-15-24 @ 22:22) (18 - 18)  SpO2: 99% (04-15-24 @ 22:22) (99% - 100%)        GENERAL: NAD, thin elderly, comfortable on room air  HEAD:  Atraumatic, Normocephalic  EYES: EOMI, PERRLA, conjunctiva and sclera clear  NECK: Supple, No JVD  CHEST/LUNG:dec breath sounds at bases  HEART: Regular rate and rhythm; No murmurs, rubs, or gallops  ABDOMEN: Soft, Nontender, Nondistended; Bowel sounds present  Neuro: awake alert,  EXTREMITIES:  2+ Peripheral Pulses, No clubbing, cyanosis, trace b/l edema  SKIN: No rashes or lesions     LABS:        Creatinine Trend:     Urine Studies:

## 2024-04-16 PROCEDURE — 99232 SBSQ HOSP IP/OBS MODERATE 35: CPT

## 2024-04-16 RX ORDER — HALOPERIDOL DECANOATE 100 MG/ML
2.5 INJECTION INTRAMUSCULAR EVERY 6 HOURS
Refills: 0 | Status: DISCONTINUED | OUTPATIENT
Start: 2024-04-16 | End: 2024-04-30

## 2024-04-16 RX ORDER — HALOPERIDOL DECANOATE 100 MG/ML
2.5 INJECTION INTRAMUSCULAR ONCE
Refills: 0 | Status: COMPLETED | OUTPATIENT
Start: 2024-04-16 | End: 2024-04-16

## 2024-04-16 RX ADMIN — SENNA PLUS 2 TABLET(S): 8.6 TABLET ORAL at 21:22

## 2024-04-16 RX ADMIN — Medication 3 MILLIGRAM(S): at 21:22

## 2024-04-16 RX ADMIN — RISPERIDONE 0.5 MILLIGRAM(S): 4 TABLET ORAL at 17:10

## 2024-04-16 RX ADMIN — RISPERIDONE 0.5 MILLIGRAM(S): 4 TABLET ORAL at 06:26

## 2024-04-16 NOTE — BH CONSULTATION LIAISON PROGRESS NOTE - NSBHMSEMOVE_PSY_A_CORE
No abnormal movements
no rigidity or cogwheeling in UEs/No abnormal movements
No abnormal movements

## 2024-04-16 NOTE — BH CONSULTATION LIAISON PROGRESS NOTE - MSE OPTIONS
Structured MSE
Unstructured MSE
Structured MSE

## 2024-04-16 NOTE — PROGRESS NOTE ADULT - SUBJECTIVE AND OBJECTIVE BOX
pt seen and examined  4/16/24    MEDICATIONS  (STANDING):  melatonin 3 milliGRAM(s) Oral <User Schedule>  polyethylene glycol 3350 17 Gram(s) Oral daily  risperiDONE   Tablet 0.5 milliGRAM(s) Oral <User Schedule>  senna 2 Tablet(s) Oral at bedtime    MEDICATIONS  (PRN):  acetaminophen     Tablet .. 650 milliGRAM(s) Oral every 6 hours PRN Temp greater or equal to 38C (100.4F), Mild Pain (1 - 3)  artificial tears (preservative free) Ophthalmic Solution 1 Drop(s) Both EYES three times a day PRN Dry Eyes  haloperidol    Injectable 2.5 milliGRAM(s) IntraMuscular every 6 hours PRN Agitation    Vital Signs Last 24 Hrs  T(C): 36.4 (04-16-24 @ 21:29), Max: 36.5 (04-16-24 @ 11:40)  T(F): 97.5 (04-16-24 @ 21:29), Max: 97.7 (04-16-24 @ 11:40)  HR: 68 (04-16-24 @ 21:29) (68 - 88)  BP: 133/83 (04-16-24 @ 11:40) (112/58 - 133/83)  BP(mean): --  RR: 18 (04-16-24 @ 21:29) (18 - 18)  SpO2: 100% (04-16-24 @ 21:29) (98% - 100%)          GENERAL: NAD, thin elderly, comfortable on room air  HEAD:  Atraumatic, Normocephalic  EYES: EOMI, PERRLA, conjunctiva and sclera clear  NECK: Supple, No JVD  CHEST/LUNG:dec breath sounds at bases  HEART: Regular rate and rhythm; No murmurs, rubs, or gallops  ABDOMEN: Soft, Nontender, Nondistended; Bowel sounds present  Neuro: awake alert,  EXTREMITIES:  2+ Peripheral Pulses, No clubbing, cyanosis, trace b/l edema  SKIN: No rashes or lesions     LABS:        Creatinine Trend:     Urine Studies:

## 2024-04-16 NOTE — BH CONSULTATION LIAISON PROGRESS NOTE - NSBHMSEBODY_PSY_A_CORE
Underweight
Average build
Underweight
Average build
Underweight
Underweight

## 2024-04-16 NOTE — BH CONSULTATION LIAISON PROGRESS NOTE - NSBHFUPREASONCONS_PSY_A_CORE
delirium/dementia
agitation
med management
dementia/agitation/med management
dementia/agitation
delirium/dementia
dementia/agitation
dementia/agitation

## 2024-04-16 NOTE — BH CONSULTATION LIAISON PROGRESS NOTE - NSBHPTASSESSDT_PSY_A_CORE
08-Apr-2024 13:27
27-Mar-2024 15:57
08-Jan-2024 11:49
20-Feb-2024 10:49
31-Jan-2024 11:01
24-Jan-2024 10:36
09-Apr-2024 15:28
16-Apr-2024 13:54
16-Feb-2024 16:25
05-Jan-2024 11:10

## 2024-04-16 NOTE — BH CONSULTATION LIAISON PROGRESS NOTE - NSBHATTESTTYPEVISIT_PSY_A_CORE
Attending Only
Attending with Resident/Fellow/Student

## 2024-04-16 NOTE — BH CONSULTATION LIAISON PROGRESS NOTE - CURRENT MEDICATION
MEDICATIONS  (STANDING):  melatonin 3 milliGRAM(s) Oral <User Schedule>  polyethylene glycol 3350 17 Gram(s) Oral daily  risperiDONE   Tablet 0.5 milliGRAM(s) Oral <User Schedule>  senna 2 Tablet(s) Oral at bedtime    MEDICATIONS  (PRN):  acetaminophen     Tablet .. 650 milliGRAM(s) Oral every 6 hours PRN Temp greater or equal to 38C (100.4F), Mild Pain (1 - 3)  artificial tears (preservative free) Ophthalmic Solution 1 Drop(s) Both EYES three times a day PRN Dry Eyes  OLANZapine Injectable 1.25 milliGRAM(s) IntraMuscular every 6 hours PRN agitation  
MEDICATIONS  (STANDING):  melatonin 3 milliGRAM(s) Oral <User Schedule>  polyethylene glycol 3350 17 Gram(s) Oral daily  QUEtiapine 75 milliGRAM(s) Oral <User Schedule>  QUEtiapine 25 milliGRAM(s) Oral <User Schedule>  QUEtiapine 25 milliGRAM(s) Oral <User Schedule>  senna 2 Tablet(s) Oral at bedtime    MEDICATIONS  (PRN):  acetaminophen     Tablet .. 650 milliGRAM(s) Oral every 6 hours PRN Temp greater or equal to 38C (100.4F), Mild Pain (1 - 3)  artificial tears (preservative free) Ophthalmic Solution 1 Drop(s) Both EYES three times a day PRN Dry Eyes  OLANZapine Injectable 1.25 milliGRAM(s) IntraMuscular every 6 hours PRN agitation  
MEDICATIONS  (STANDING):  melatonin 3 milliGRAM(s) Oral <User Schedule>  polyethylene glycol 3350 17 Gram(s) Oral daily  QUEtiapine 75 milliGRAM(s) Oral <User Schedule>  QUEtiapine 25 milliGRAM(s) Oral <User Schedule>  QUEtiapine 25 milliGRAM(s) Oral once  senna 2 Tablet(s) Oral at bedtime    MEDICATIONS  (PRN):  acetaminophen     Tablet .. 650 milliGRAM(s) Oral every 6 hours PRN Temp greater or equal to 38C (100.4F), Mild Pain (1 - 3)  artificial tears (preservative free) Ophthalmic Solution 1 Drop(s) Both EYES three times a day PRN Dry Eyes  OLANZapine Injectable 1.25 milliGRAM(s) IntraMuscular every 6 hours PRN agitation  
MEDICATIONS  (STANDING):  melatonin 3 milliGRAM(s) Oral at bedtime  QUEtiapine 12.5 milliGRAM(s) Oral <User Schedule>  QUEtiapine 50 milliGRAM(s) Oral <User Schedule>  QUEtiapine 25 milliGRAM(s) Oral <User Schedule>    MEDICATIONS  (PRN):  acetaminophen     Tablet .. 650 milliGRAM(s) Oral every 6 hours PRN Temp greater or equal to 38C (100.4F), Mild Pain (1 - 3)  OLANZapine Injectable 1.25 milliGRAM(s) IntraMuscular every 6 hours PRN agitation  
MEDICATIONS  (STANDING):  melatonin 3 milliGRAM(s) Oral at bedtime  QUEtiapine 50 milliGRAM(s) Oral <User Schedule>    MEDICATIONS  (PRN):  acetaminophen     Tablet .. 650 milliGRAM(s) Oral every 6 hours PRN Temp greater or equal to 38C (100.4F), Mild Pain (1 - 3)  OLANZapine Injectable 1.25 milliGRAM(s) IntraMuscular every 6 hours PRN agitation  
MEDICATIONS  (STANDING):  melatonin 3 milliGRAM(s) Oral at bedtime  QUEtiapine 25 milliGRAM(s) Oral <User Schedule>  QUEtiapine 50 milliGRAM(s) Oral <User Schedule>  QUEtiapine 25 milliGRAM(s) Oral <User Schedule>    MEDICATIONS  (PRN):  acetaminophen     Tablet .. 650 milliGRAM(s) Oral every 6 hours PRN Temp greater or equal to 38C (100.4F), Mild Pain (1 - 3)  OLANZapine Injectable 1.25 milliGRAM(s) IntraMuscular every 6 hours PRN agitation  
MEDICATIONS  (STANDING):  melatonin 3 milliGRAM(s) Oral <User Schedule>  polyethylene glycol 3350 17 Gram(s) Oral daily  risperiDONE   Tablet 0.5 milliGRAM(s) Oral <User Schedule>  senna 2 Tablet(s) Oral at bedtime    MEDICATIONS  (PRN):  acetaminophen     Tablet .. 650 milliGRAM(s) Oral every 6 hours PRN Temp greater or equal to 38C (100.4F), Mild Pain (1 - 3)  artificial tears (preservative free) Ophthalmic Solution 1 Drop(s) Both EYES three times a day PRN Dry Eyes  haloperidol    Injectable 2.5 milliGRAM(s) IntraMuscular once PRN Agitation  OLANZapine Injectable 1.25 milliGRAM(s) IntraMuscular every 6 hours PRN agitation  
MEDICATIONS  (STANDING):  melatonin 3 milliGRAM(s) Oral at bedtime  QUEtiapine 25 milliGRAM(s) Oral <User Schedule>    MEDICATIONS  (PRN):  acetaminophen     Tablet .. 650 milliGRAM(s) Oral every 6 hours PRN Temp greater or equal to 38C (100.4F), Mild Pain (1 - 3)  OLANZapine Injectable 1.25 milliGRAM(s) IntraMuscular every 6 hours PRN agitation  
MEDICATIONS  (STANDING):  melatonin 3 milliGRAM(s) Oral at bedtime    MEDICATIONS  (PRN):  acetaminophen     Tablet .. 650 milliGRAM(s) Oral every 6 hours PRN Temp greater or equal to 38C (100.4F), Mild Pain (1 - 3)  
MEDICATIONS  (STANDING):  melatonin 3 milliGRAM(s) Oral at bedtime  QUEtiapine 25 milliGRAM(s) Oral <User Schedule>    MEDICATIONS  (PRN):  acetaminophen     Tablet .. 650 milliGRAM(s) Oral every 6 hours PRN Temp greater or equal to 38C (100.4F), Mild Pain (1 - 3)  OLANZapine Injectable 1.25 milliGRAM(s) IntraMuscular every 6 hours PRN agitation

## 2024-04-16 NOTE — BH CONSULTATION LIAISON PROGRESS NOTE - NSBHASSESSMENTFT_PSY_ALL_CORE
82 y/o F with PMH of possible dementia p/w confusion.  Pt was found by bystander (who is a nurse) wandering by a local grocery store and appeared confused.  Bystander brought pt to her home and noted pt's home was in a poor state, and did not seem safe for pt to live alone.  Pt brought to ED by EMS.     On evaluation patient is alert, oriented X1, calm but confused, needed a lot of redirection but was not agitated. in the ED patient found to have UTI and antibiotic was started. with no know past psychiatric history and her history of infection it is most likely to be delirium with underlying dementia. currently patient is calm, did not need any PRNs for the last 2 days.     1/5: Patient orientedX1, confused, not sleeping and getting agitated at night where she requires PRNs.   1/8: Patient is oriented x1. better sleep and no PRNS. Plan to continue Seroquel 25 mg at 8 pm.   1/24: Patient is confused, received Olanzapine PRN PO X1 for agitation in the last 24 hours. Can increase Seroquel to 50 mg at 8 pm.   1/31: Patient is confused, intermittently agitated receiving PRNs, Patient often get agitated due to confusion in the morning and would benefit from a small dose of Seroquel in the morning as it has been working well.   2/16: Received IM olanzapine and haloperidol overnight, likely in setting of sundowning/confusion. Reporting anxiety today. Increase quetiapine afternoon dose of seroquel (from 12.5mg to 25mg).   2/20: much improved, no recent PRNs. Calm, pleasant, poor insight (baseline).  4/8--- intermittent agitation  4/9--- agitated, still waiting for bus, cognition poor, switch to risperdal standing and haldol PRNs, no EPS on exam currently   4/16 - not as agitated, doing well no EPS     Plan:   - Routine observation as pt denies SI/HI  - Does not have capacity to participate in safe discharge planning   - PRNs: For agitation, can use Haldol 2.5mg q6h PRN, if still agitated after can give Ativan 0.5mg IM/IV with haldol (hold for resp. depression)  - STOP Quetiapine  - BEGIN Risperdal 0.5mg BID PO at 7am + 5pm   - Medical management per primary team   - Hold antipsychotics if QTc > 500 ms    Dispo: No role for inpatient psychiatry admission, but patient would benefit from higher level of care like a nursing home.

## 2024-04-16 NOTE — BH CONSULTATION LIAISON PROGRESS NOTE - NS ED [facility] MED ROS PSYCHIATRIC
See HPI
[Time Spent: ___ minutes] : I have spent [unfilled] minutes of time on the encounter.
See HPI

## 2024-04-16 NOTE — BH CONSULTATION LIAISON PROGRESS NOTE - NS ED [facility] REVIEW OF ED CHART AVAILABLE LABS REVIEWED
None available
Yes
None available

## 2024-04-16 NOTE — BH CONSULTATION LIAISON PROGRESS NOTE - NSBHFUPINTERVALCCFT_PSY_A_CORE
"I'm fine"
Easily agitated, can get loud in the hallway  Sleep was poor last night  Case discussed with medicine and [address] [place] [address] team-- below discussed  
Hello!!
Can be easily agitated, difficult to redirect.   Case discussed with medicine and [address] [place] [address] team-- below discussed
Hello?!
"I feel anxious"
"I am ok" 
"I am ok" 
" I am worried about my dog" 
"I am ok"

## 2024-04-16 NOTE — BH CONSULTATION LIAISON PROGRESS NOTE - NSBHCHARTREVIEWVS_PSY_A_CORE FT
Vital Signs Last 24 Hrs  T(C): 36.4 (08 Apr 2024 04:56), Max: 36.7 (07 Apr 2024 17:00)  T(F): 97.6 (08 Apr 2024 04:56), Max: 98 (07 Apr 2024 17:00)  HR: 64 (08 Apr 2024 04:56) (64 - 66)  BP: 129/85 (08 Apr 2024 04:56) (119/78 - 129/85)  BP(mean): 89 (07 Apr 2024 17:00) (89 - 89)  RR: 18 (08 Apr 2024 04:56) (18 - 18)  SpO2: 98% (08 Apr 2024 04:56) (96% - 100%)    Parameters below as of 08 Apr 2024 04:56  Patient On (Oxygen Delivery Method): room air    
Vital Signs Last 24 Hrs  T(C): 36.6 (27 Mar 2024 11:44), Max: 36.8 (26 Mar 2024 20:00)  T(F): 97.9 (27 Mar 2024 11:44), Max: 98.2 (26 Mar 2024 20:00)  HR: 61 (27 Mar 2024 11:44) (61 - 74)  BP: 138/57 (27 Mar 2024 11:44) (122/58 - 144/55)  BP(mean): --  RR: 17 (27 Mar 2024 11:44) (17 - 18)  SpO2: 100% (27 Mar 2024 11:44) (100% - 100%)    Parameters below as of 27 Mar 2024 11:44  Patient On (Oxygen Delivery Method): room air    
Vital Signs Last 24 Hrs  T(C): 36.2 (20 Feb 2024 05:12), Max: 36.9 (19 Feb 2024 19:17)  T(F): 97.2 (20 Feb 2024 05:12), Max: 98.4 (19 Feb 2024 19:17)  HR: 68 (20 Feb 2024 05:12) (62 - 84)  BP: 152/52 (20 Feb 2024 05:12) (111/64 - 152/52)  BP(mean): 80 (20 Feb 2024 05:12) (80 - 80)  RR: 18 (20 Feb 2024 05:12) (18 - 18)  SpO2: 100% (20 Feb 2024 05:12) (97% - 100%)    Parameters below as of 20 Feb 2024 05:12  Patient On (Oxygen Delivery Method): room air    
Vital Signs Last 24 Hrs  T(C): 36.6 (31 Jan 2024 05:17), Max: 36.8 (30 Jan 2024 11:03)  T(F): 97.8 (31 Jan 2024 05:17), Max: 98.3 (30 Jan 2024 19:30)  HR: 100 (31 Jan 2024 05:17) (63 - 100)  BP: 96/77 (31 Jan 2024 05:17) (96/77 - 117/48)  BP(mean): --  RR: 18 (31 Jan 2024 05:17) (18 - 18)  SpO2: 99% (31 Jan 2024 05:17) (99% - 100%)    Parameters below as of 31 Jan 2024 05:17  Patient On (Oxygen Delivery Method): room air    
Vital Signs Last 24 Hrs  T(C): 36.5 (16 Apr 2024 11:40), Max: 36.5 (16 Apr 2024 11:40)  T(F): 97.7 (16 Apr 2024 11:40), Max: 97.7 (16 Apr 2024 11:40)  HR: 88 (16 Apr 2024 11:40) (65 - 88)  BP: 133/83 (16 Apr 2024 11:40) (112/58 - 133/83)  BP(mean): --  RR: 18 (16 Apr 2024 11:40) (18 - 18)  SpO2: 100% (16 Apr 2024 11:40) (98% - 100%)    Parameters below as of 16 Apr 2024 11:40  Patient On (Oxygen Delivery Method): room air    
Vital Signs Last 24 Hrs  T(C): 36.7 (16 Feb 2024 11:15), Max: 36.7 (16 Feb 2024 05:06)  T(F): 98.1 (16 Feb 2024 11:15), Max: 98.1 (16 Feb 2024 05:06)  HR: 72 (16 Feb 2024 11:15) (72 - 74)  BP: 128/59 (16 Feb 2024 11:15) (128/59 - 132/60)  BP(mean): --  RR: 16 (16 Feb 2024 11:15) (16 - 18)  SpO2: 100% (16 Feb 2024 11:15) (98% - 100%)    Parameters below as of 16 Feb 2024 11:15  Patient On (Oxygen Delivery Method): room air    
Vital Signs Last 24 Hrs  T(C): 36.6 (24 Jan 2024 05:00), Max: 36.6 (23 Jan 2024 19:35)  T(F): 97.9 (24 Jan 2024 05:00), Max: 97.9 (24 Jan 2024 05:00)  HR: 62 (24 Jan 2024 05:00) (62 - 66)  BP: 107/57 (24 Jan 2024 05:00) (107/57 - 120/65)  BP(mean): --  RR: 18 (24 Jan 2024 05:00) (18 - 18)  SpO2: 100% (24 Jan 2024 05:00) (100% - 100%)    Parameters below as of 24 Jan 2024 05:00  Patient On (Oxygen Delivery Method): room air    
Vital Signs Last 24 Hrs  T(C): 36.6 (05 Jan 2024 05:00), Max: 36.8 (04 Jan 2024 11:31)  T(F): 97.9 (05 Jan 2024 05:00), Max: 98.3 (04 Jan 2024 11:31)  HR: 70 (05 Jan 2024 05:00) (64 - 72)  BP: 118/74 (05 Jan 2024 05:00) (118/74 - 148/66)  BP(mean): --  RR: 16 (05 Jan 2024 05:00) (16 - 18)  SpO2: 99% (05 Jan 2024 05:00) (96% - 100%)    Parameters below as of 05 Jan 2024 05:00  Patient On (Oxygen Delivery Method): room air    
Vital Signs Last 24 Hrs  T(C): 37.1 (09 Apr 2024 14:57), Max: 37.1 (09 Apr 2024 14:57)  T(F): 98.8 (09 Apr 2024 14:57), Max: 98.8 (09 Apr 2024 14:57)  HR: 98 (09 Apr 2024 14:57) (68 - 98)  BP: 124/83 (09 Apr 2024 05:30) (124/83 - 136/78)  BP(mean): --  RR: 18 (09 Apr 2024 14:57) (18 - 18)  SpO2: 98% (09 Apr 2024 14:57) (97% - 98%)    Parameters below as of 09 Apr 2024 14:57  Patient On (Oxygen Delivery Method): room air    
Vital Signs Last 24 Hrs  T(C): 36.7 (08 Jan 2024 11:41), Max: 36.8 (07 Jan 2024 22:13)  T(F): 98 (08 Jan 2024 11:41), Max: 98.2 (07 Jan 2024 22:13)  HR: 59 (08 Jan 2024 11:41) (59 - 86)  BP: 120/58 (08 Jan 2024 11:41) (119/49 - 122/59)  BP(mean): --  RR: 18 (08 Jan 2024 11:41) (18 - 18)  SpO2: 100% (08 Jan 2024 11:41) (96% - 100%)    Parameters below as of 08 Jan 2024 11:41  Patient On (Oxygen Delivery Method): room air

## 2024-04-17 RX ADMIN — SENNA PLUS 2 TABLET(S): 8.6 TABLET ORAL at 21:46

## 2024-04-17 RX ADMIN — RISPERIDONE 0.5 MILLIGRAM(S): 4 TABLET ORAL at 17:51

## 2024-04-17 RX ADMIN — Medication 3 MILLIGRAM(S): at 21:47

## 2024-04-17 RX ADMIN — RISPERIDONE 0.5 MILLIGRAM(S): 4 TABLET ORAL at 05:52

## 2024-04-17 NOTE — PROGRESS NOTE ADULT - SUBJECTIVE AND OBJECTIVE BOX
pt seen and examined  4/17/24    MEDICATIONS  (STANDING):  melatonin 3 milliGRAM(s) Oral <User Schedule>  polyethylene glycol 3350 17 Gram(s) Oral daily  risperiDONE   Tablet 0.5 milliGRAM(s) Oral <User Schedule>  senna 2 Tablet(s) Oral at bedtime    MEDICATIONS  (PRN):  acetaminophen     Tablet .. 650 milliGRAM(s) Oral every 6 hours PRN Temp greater or equal to 38C (100.4F), Mild Pain (1 - 3)  artificial tears (preservative free) Ophthalmic Solution 1 Drop(s) Both EYES three times a day PRN Dry Eyes  haloperidol    Injectable 2.5 milliGRAM(s) IntraMuscular every 6 hours PRN Agitation    Vital Signs Last 24 Hrs  T(C): 36.7 (04-17-24 @ 14:13), Max: 36.7 (04-17-24 @ 14:13)  T(F): 98.1 (04-17-24 @ 14:13), Max: 98.1 (04-17-24 @ 14:13)  HR: 80 (04-17-24 @ 14:13) (66 - 80)  BP: 136/60 (04-17-24 @ 14:13) (103/63 - 136/60)  BP(mean): --  RR: 18 (04-17-24 @ 14:13) (18 - 18)  SpO2: 100% (04-17-24 @ 14:13) (100% - 100%)        GENERAL: NAD, thin elderly, comfortable on room air  HEAD:  Atraumatic, Normocephalic  EYES: EOMI, PERRLA, conjunctiva and sclera clear  NECK: Supple, No JVD  CHEST/LUNG:dec breath sounds at bases  HEART: Regular rate and rhythm; No murmurs, rubs, or gallops  ABDOMEN: Soft, Nontender, Nondistended; Bowel sounds present  Neuro: awake alert,  EXTREMITIES:  2+ Peripheral Pulses, No clubbing, cyanosis, trace b/l edema  SKIN: No rashes or lesions     LABS:        Creatinine Trend:     Urine Studies:

## 2024-04-18 RX ADMIN — RISPERIDONE 0.5 MILLIGRAM(S): 4 TABLET ORAL at 11:20

## 2024-04-18 RX ADMIN — SENNA PLUS 2 TABLET(S): 8.6 TABLET ORAL at 20:41

## 2024-04-18 RX ADMIN — Medication 3 MILLIGRAM(S): at 20:41

## 2024-04-18 RX ADMIN — RISPERIDONE 0.5 MILLIGRAM(S): 4 TABLET ORAL at 17:13

## 2024-04-18 NOTE — PROGRESS NOTE ADULT - SUBJECTIVE AND OBJECTIVE BOX
pt seen and examined  4/18/24    MEDICATIONS  (STANDING):  melatonin 3 milliGRAM(s) Oral <User Schedule>  polyethylene glycol 3350 17 Gram(s) Oral daily  risperiDONE   Tablet 0.5 milliGRAM(s) Oral <User Schedule>  senna 2 Tablet(s) Oral at bedtime    MEDICATIONS  (PRN):  acetaminophen     Tablet .. 650 milliGRAM(s) Oral every 6 hours PRN Temp greater or equal to 38C (100.4F), Mild Pain (1 - 3)  artificial tears (preservative free) Ophthalmic Solution 1 Drop(s) Both EYES three times a day PRN Dry Eyes  haloperidol    Injectable 2.5 milliGRAM(s) IntraMuscular every 6 hours PRN Agitation    Vital Signs Last 24 Hrs  T(C): 36.4 (04-18-24 @ 19:51), Max: 36.8 (04-18-24 @ 05:12)  T(F): 97.6 (04-18-24 @ 19:51), Max: 98.3 (04-18-24 @ 05:12)  HR: 81 (04-18-24 @ 19:51) (74 - 87)  BP: 130/54 (04-18-24 @ 19:51) (129/65 - 136/68)  BP(mean): --  RR: 18 (04-18-24 @ 19:51) (18 - 18)  SpO2: 100% (04-18-24 @ 19:51) (100% - 100%)        GENERAL: NAD, thin elderly, comfortable on room air  HEAD:  Atraumatic, Normocephalic  EYES: EOMI, PERRLA, conjunctiva and sclera clear  NECK: Supple, No JVD  CHEST/LUNG:dec breath sounds at bases  HEART: Regular rate and rhythm; No murmurs, rubs, or gallops  ABDOMEN: Soft, Nontender, Nondistended; Bowel sounds present  Neuro: awake alert,  EXTREMITIES:  2+ Peripheral Pulses, No clubbing, cyanosis, trace b/l edema  SKIN: No rashes or lesions     LABS:        Creatinine Trend:     Urine Studies:

## 2024-04-18 NOTE — CHART NOTE - NSCHARTNOTEFT_GEN_A_CORE
Source: Patient [ ]    Family [ ]     Other (Chart Review) [X]    Patient is seen for follow-up nutrition assessment for moderate malnutrition.     Medical Course: Per chart review, patient is an 83y Female with PMH possible dementia who presented to [facility] with delirium. Pending placement and guardianship.    Nutrition Course: Patient is currently ordered for a PO diet with Hormel Shakes BID. Writer attempted to meet with patient on unit, however patient not present in room or in the hallways. Unable to obtain information from her as a result. Documented on RN flowsheet as consuming % of meals, suggestive of a good appetite during course of admission. No report of any chewing or swallowing difficulty on current diet order. No report of GI distress (nausea, vomiting, diarrhea, constipation).     Diet : Regular    Anthropometrics  Weights per RN flowsheet: 60.5kg (4/17)...56.5kg (2/28)...  % Weight Change: +4kg (7%) x7 weeks period of time; currently documented with edema (see below)    Pertinent Medications: MEDICATIONS  (STANDING):  melatonin 3 milliGRAM(s) Oral <User Schedule>  polyethylene glycol 3350 17 Gram(s) Oral daily  risperiDONE   Tablet 0.5 milliGRAM(s) Oral <User Schedule>  senna 2 Tablet(s) Oral at bedtime    MEDICATIONS  (PRN):  acetaminophen     Tablet .. 650 milliGRAM(s) Oral every 6 hours PRN Temp greater or equal to 38C (100.4F), Mild Pain (1 - 3)  artificial tears (preservative free) Ophthalmic Solution 1 Drop(s) Both EYES three times a day PRN Dry Eyes  haloperidol    Injectable 2.5 milliGRAM(s) IntraMuscular every 6 hours PRN Agitation    Pertinent Labs: No updated labs since 3/29/24    Skin: No pressure ulcers/injuries documented per RN flowsheets     Fluid: Edema 2+ (left ankle, right ankle) per RN flowsheet    GI: Last BM 4/16/24 per RN flowsheet documentation. Noted to be on a bowel regimen.     Estimated Needs:   Weight Used: Ideal Body Weight 120lb/55kg  Estimated Energy Needs: 1375-1650kcal (based on 25-30kcal/kg)  Estimated Protein Needs: 55-66gms (based on 1-1.2gms/kg)     Previous Nutrition Diagnosis: Moderate malnutrition    Nutrition Diagnosis is [X] ongoing    New Nutrition Diagnosis: Not applicable    Education: [X] Not warranted at present    Nutrition Recommendations  - Continue current diet order, as tolerated  - Continue provision of Hormel 500 Vital Shake (provides 520kcal, 22gms protein per serving) BID ( Food and Nutrition Department will provide )  - Monitor weights, labs, BM's, skin integrity, p.o. intake.   - Please monitor % PO intake on flowsheets  - RD remains available, please consult as needed     Monitoring and Evaluation:   [X] PO intake [ ] Tolerance to diet prescription [X] weights [X] follow up per protocol    Francisca Ghosh MS RDN CDN  On [employer] Teams or Pager #[phone]

## 2024-04-19 RX ADMIN — HALOPERIDOL DECANOATE 2.5 MILLIGRAM(S): 100 INJECTION INTRAMUSCULAR at 18:38

## 2024-04-19 RX ADMIN — SENNA PLUS 2 TABLET(S): 8.6 TABLET ORAL at 20:11

## 2024-04-19 RX ADMIN — RISPERIDONE 0.5 MILLIGRAM(S): 4 TABLET ORAL at 16:45

## 2024-04-19 RX ADMIN — POLYETHYLENE GLYCOL 3350 17 GRAM(S): 17 POWDER, FOR SOLUTION ORAL at 11:18

## 2024-04-19 RX ADMIN — Medication 3 MILLIGRAM(S): at 20:11

## 2024-04-19 RX ADMIN — RISPERIDONE 0.5 MILLIGRAM(S): 4 TABLET ORAL at 06:40

## 2024-04-19 NOTE — PROGRESS NOTE ADULT - SUBJECTIVE AND OBJECTIVE BOX
pt seen and examined  4/19/24    MEDICATIONS  (STANDING):  melatonin 3 milliGRAM(s) Oral <User Schedule>  polyethylene glycol 3350 17 Gram(s) Oral daily  risperiDONE   Tablet 0.5 milliGRAM(s) Oral <User Schedule>  senna 2 Tablet(s) Oral at bedtime    MEDICATIONS  (PRN):  acetaminophen     Tablet .. 650 milliGRAM(s) Oral every 6 hours PRN Temp greater or equal to 38C (100.4F), Mild Pain (1 - 3)  artificial tears (preservative free) Ophthalmic Solution 1 Drop(s) Both EYES three times a day PRN Dry Eyes  haloperidol    Injectable 2.5 milliGRAM(s) IntraMuscular every 6 hours PRN Agitation    Vital Signs Last 24 Hrs  T(C): 36.4 (04-18-24 @ 19:51), Max: 36.8 (04-18-24 @ 05:12)  T(F): 97.6 (04-18-24 @ 19:51), Max: 98.3 (04-18-24 @ 05:12)  HR: 81 (04-18-24 @ 19:51) (74 - 87)  BP: 130/54 (04-18-24 @ 19:51) (129/65 - 136/68)  BP(mean): --  RR: 18 (04-18-24 @ 19:51) (18 - 18)  SpO2: 100% (04-18-24 @ 19:51) (100% - 100%)        GENERAL: NAD, thin elderly, comfortable on room air  HEAD:  Atraumatic, Normocephalic  EYES: EOMI, PERRLA, conjunctiva and sclera clear  NECK: Supple, No JVD  CHEST/LUNG:dec breath sounds at bases  HEART: Regular rate and rhythm; No murmurs, rubs, or gallops  ABDOMEN: Soft, Nontender, Nondistended; Bowel sounds present  Neuro: awake alert,  EXTREMITIES:  2+ Peripheral Pulses, No clubbing, cyanosis, trace b/l edema  SKIN: No rashes or lesions     LABS:        Creatinine Trend:     Urine Studies:

## 2024-04-20 RX ADMIN — RISPERIDONE 0.5 MILLIGRAM(S): 4 TABLET ORAL at 06:03

## 2024-04-20 RX ADMIN — POLYETHYLENE GLYCOL 3350 17 GRAM(S): 17 POWDER, FOR SOLUTION ORAL at 11:35

## 2024-04-20 RX ADMIN — Medication 3 MILLIGRAM(S): at 20:32

## 2024-04-20 RX ADMIN — RISPERIDONE 0.5 MILLIGRAM(S): 4 TABLET ORAL at 17:33

## 2024-04-20 RX ADMIN — SENNA PLUS 2 TABLET(S): 8.6 TABLET ORAL at 20:32

## 2024-04-20 RX ADMIN — HALOPERIDOL DECANOATE 2.5 MILLIGRAM(S): 100 INJECTION INTRAMUSCULAR at 21:14

## 2024-04-20 NOTE — PROGRESS NOTE ADULT - SUBJECTIVE AND OBJECTIVE BOX
pt seen and examined  4/20/24    MEDICATIONS  (STANDING):  melatonin 3 milliGRAM(s) Oral <User Schedule>  polyethylene glycol 3350 17 Gram(s) Oral daily  risperiDONE   Tablet 0.5 milliGRAM(s) Oral <User Schedule>  senna 2 Tablet(s) Oral at bedtime    MEDICATIONS  (STANDING):  melatonin 3 milliGRAM(s) Oral <User Schedule>  polyethylene glycol 3350 17 Gram(s) Oral daily  risperiDONE   Tablet 0.5 milliGRAM(s) Oral <User Schedule>  senna 2 Tablet(s) Oral at bedtime    Vital Signs Last 24 Hrs  T(C): 36.7 (04-20-24 @ 20:33), Max: 36.8 (04-20-24 @ 11:26)  T(F): 98 (04-20-24 @ 20:33), Max: 98.3 (04-20-24 @ 11:26)  HR: 75 (04-20-24 @ 20:33) (65 - 75)  BP: 130/51 (04-20-24 @ 20:33) (126/51 - 138/54)  BP(mean): --  RR: 16 (04-20-24 @ 20:33) (16 - 17)  SpO2: 99% (04-20-24 @ 20:33) (98% - 100%)      MEDICATIONS  (PRN):  acetaminophen     Tablet .. 650 milliGRAM(s) Oral every 6 hours PRN Temp greater or equal to 38C (100.4F), Mild Pain (1 - 3)  artificial tears (preservative free) Ophthalmic Solution 1 Drop(s) Both EYES three times a day PRN Dry Eyes  haloperidol    Injectable 2.5 milliGRAM(s) IntraMuscular every 6 hours PRN Agitation  GENERAL: NAD, thin elderly, comfortable on room air  HEAD:  Atraumatic, Normocephalic  EYES: EOMI, PERRLA, conjunctiva and sclera clear  NECK: Supple, No JVD  CHEST/LUNG:dec breath sounds at bases  HEART: Regular rate and rhythm; No murmurs, rubs, or gallops  ABDOMEN: Soft, Nontender, Nondistended; Bowel sounds present  Neuro: awake alert,  EXTREMITIES:  2+ Peripheral Pulses, No clubbing, cyanosis, trace b/l edema  SKIN: No rashes or lesions     LABS:        Creatinine Trend:     Urine Studies:

## 2024-04-21 RX ADMIN — RISPERIDONE 0.5 MILLIGRAM(S): 4 TABLET ORAL at 17:54

## 2024-04-21 RX ADMIN — Medication 3 MILLIGRAM(S): at 20:08

## 2024-04-21 RX ADMIN — RISPERIDONE 0.5 MILLIGRAM(S): 4 TABLET ORAL at 05:52

## 2024-04-21 RX ADMIN — SENNA PLUS 2 TABLET(S): 8.6 TABLET ORAL at 20:08

## 2024-04-21 NOTE — PROGRESS NOTE ADULT - SUBJECTIVE AND OBJECTIVE BOX
pt seen and examined  4/21/24    MEDICATIONS  (STANDING):  melatonin 3 milliGRAM(s) Oral <User Schedule>  polyethylene glycol 3350 17 Gram(s) Oral daily  risperiDONE   Tablet 0.5 milliGRAM(s) Oral <User Schedule>  senna 2 Tablet(s) Oral at bedtime    MEDICATIONS  (PRN):  acetaminophen     Tablet .. 650 milliGRAM(s) Oral every 6 hours PRN Temp greater or equal to 38C (100.4F), Mild Pain (1 - 3)  artificial tears (preservative free) Ophthalmic Solution 1 Drop(s) Both EYES three times a day PRN Dry Eyes  haloperidol    Injectable 2.5 milliGRAM(s) IntraMuscular every 6 hours PRN Agitation      Vital Signs Last 24 Hrs  T(C): 36.5 (04-21-24 @ 11:38), Max: 36.7 (04-20-24 @ 20:33)  T(F): 97.7 (04-21-24 @ 11:38), Max: 98 (04-20-24 @ 20:33)  HR: 63 (04-21-24 @ 11:38) (63 - 75)  BP: 132/60 (04-21-24 @ 11:38) (130/51 - 138/72)  BP(mean): --  RR: 18 (04-21-24 @ 11:38) (16 - 18)  SpO2: 100% (04-21-24 @ 11:38) (99% - 100%)    GENERAL: NAD, thin elderly, comfortable on room air  HEAD:  Atraumatic, Normocephalic  EYES: EOMI, PERRLA, conjunctiva and sclera clear  NECK: Supple, No JVD  CHEST/LUNG:dec breath sounds at bases  HEART: Regular rate and rhythm; No murmurs, rubs, or gallops  ABDOMEN: Soft, Nontender, Nondistended; Bowel sounds present  Neuro: awake alert,  EXTREMITIES:  2+ Peripheral Pulses, No clubbing, cyanosis, trace b/l edema  SKIN: No rashes or lesions     LABS:        Creatinine Trend:     Urine Studies:

## 2024-04-22 RX ADMIN — POLYETHYLENE GLYCOL 3350 17 GRAM(S): 17 POWDER, FOR SOLUTION ORAL at 13:25

## 2024-04-22 RX ADMIN — RISPERIDONE 0.5 MILLIGRAM(S): 4 TABLET ORAL at 06:01

## 2024-04-22 RX ADMIN — RISPERIDONE 0.5 MILLIGRAM(S): 4 TABLET ORAL at 17:48

## 2024-04-22 RX ADMIN — Medication 3 MILLIGRAM(S): at 20:50

## 2024-04-22 RX ADMIN — SENNA PLUS 2 TABLET(S): 8.6 TABLET ORAL at 20:50

## 2024-04-22 NOTE — PROGRESS NOTE ADULT - SUBJECTIVE AND OBJECTIVE BOX
pt seen and examined  4/22/24    MEDICATIONS  (STANDING):  melatonin 3 milliGRAM(s) Oral <User Schedule>  polyethylene glycol 3350 17 Gram(s) Oral daily  risperiDONE   Tablet 0.5 milliGRAM(s) Oral <User Schedule>  senna 2 Tablet(s) Oral at bedtime    MEDICATIONS  (PRN):  acetaminophen     Tablet .. 650 milliGRAM(s) Oral every 6 hours PRN Temp greater or equal to 38C (100.4F), Mild Pain (1 - 3)  artificial tears (preservative free) Ophthalmic Solution 1 Drop(s) Both EYES three times a day PRN Dry Eyes  haloperidol    Injectable 2.5 milliGRAM(s) IntraMuscular every 6 hours PRN Agitation    Vital Signs Last 24 Hrs  T(C): 36.7 (04-22-24 @ 19:56), Max: 36.7 (04-22-24 @ 19:56)  T(F): 98.1 (04-22-24 @ 19:56), Max: 98.1 (04-22-24 @ 19:56)  HR: 83 (04-22-24 @ 19:56) (66 - 83)  BP: 136/56 (04-22-24 @ 19:56) (125/59 - 136/56)  BP(mean): --  RR: 18 (04-22-24 @ 19:56) (16 - 18)  SpO2: 99% (04-22-24 @ 19:56) (99% - 100%)      GENERAL: NAD, thin elderly, comfortable on room air  HEAD:  Atraumatic, Normocephalic  EYES: EOMI, PERRLA, conjunctiva and sclera clear  NECK: Supple, No JVD  CHEST/LUNG:dec breath sounds at bases  HEART: Regular rate and rhythm; No murmurs, rubs, or gallops  ABDOMEN: Soft, Nontender, Nondistended; Bowel sounds present  Neuro: awake alert,  EXTREMITIES:  2+ Peripheral Pulses, No clubbing, cyanosis, trace b/l edema  SKIN: No rashes or lesions     LABS:        Creatinine Trend:     Urine Studies:

## 2024-04-23 PROCEDURE — 99232 SBSQ HOSP IP/OBS MODERATE 35: CPT

## 2024-04-23 RX ORDER — RISPERIDONE 4 MG/1
0.5 TABLET ORAL
Refills: 0 | Status: DISCONTINUED | OUTPATIENT
Start: 2024-04-23 | End: 2024-04-30

## 2024-04-23 RX ORDER — RISPERIDONE 4 MG/1
0.25 TABLET ORAL
Refills: 0 | Status: DISCONTINUED | OUTPATIENT
Start: 2024-04-23 | End: 2024-04-30

## 2024-04-23 RX ORDER — RISPERIDONE 4 MG/1
0.5 TABLET ORAL
Refills: 0 | Status: DISCONTINUED | OUTPATIENT
Start: 2024-04-23 | End: 2024-04-23

## 2024-04-23 RX ADMIN — SENNA PLUS 2 TABLET(S): 8.6 TABLET ORAL at 20:14

## 2024-04-23 RX ADMIN — RISPERIDONE 0.5 MILLIGRAM(S): 4 TABLET ORAL at 06:02

## 2024-04-23 RX ADMIN — POLYETHYLENE GLYCOL 3350 17 GRAM(S): 17 POWDER, FOR SOLUTION ORAL at 12:25

## 2024-04-23 RX ADMIN — Medication 3 MILLIGRAM(S): at 20:13

## 2024-04-23 RX ADMIN — RISPERIDONE 0.5 MILLIGRAM(S): 4 TABLET ORAL at 16:40

## 2024-04-23 NOTE — PROGRESS NOTE ADULT - SUBJECTIVE AND OBJECTIVE BOX
pt seen and examined  4/23/24    MEDICATIONS  (STANDING):  melatonin 3 milliGRAM(s) Oral <User Schedule>  polyethylene glycol 3350 17 Gram(s) Oral daily  risperiDONE   Tablet 0.25 milliGRAM(s) Oral <User Schedule>  risperiDONE   Tablet 0.5 milliGRAM(s) Oral <User Schedule>  senna 2 Tablet(s) Oral at bedtime    MEDICATIONS  (PRN):  acetaminophen     Tablet .. 650 milliGRAM(s) Oral every 6 hours PRN Temp greater or equal to 38C (100.4F), Mild Pain (1 - 3)  artificial tears (preservative free) Ophthalmic Solution 1 Drop(s) Both EYES three times a day PRN Dry Eyes  haloperidol    Injectable 2.5 milliGRAM(s) IntraMuscular every 6 hours PRN Agitation    Vital Signs Last 24 Hrs  T(C): 36.4 (04-23-24 @ 20:09), Max: 36.7 (04-23-24 @ 11:05)  T(F): 97.6 (04-23-24 @ 20:09), Max: 98 (04-23-24 @ 11:05)  HR: 62 (04-23-24 @ 20:09) (62 - 75)  BP: 137/57 (04-23-24 @ 20:09) (122/75 - 137/57)  BP(mean): --  RR: 18 (04-23-24 @ 20:09) (15 - 18)  SpO2: 100% (04-23-24 @ 20:09) (95% - 100%)        GENERAL: NAD, thin elderly, comfortable on room air  HEAD:  Atraumatic, Normocephalic  EYES: EOMI, PERRLA, conjunctiva and sclera clear  NECK: Supple, No JVD  CHEST/LUNG:dec breath sounds at bases  HEART: Regular rate and rhythm; No murmurs, rubs, or gallops  ABDOMEN: Soft, Nontender, Nondistended; Bowel sounds present  Neuro: awake alert,  EXTREMITIES:  2+ Peripheral Pulses, No clubbing, cyanosis, trace b/l edema  SKIN: No rashes or lesions     LABS:        Creatinine Trend:     Urine Studies:

## 2024-04-24 RX ADMIN — RISPERIDONE 0.5 MILLIGRAM(S): 4 TABLET ORAL at 16:45

## 2024-04-24 RX ADMIN — RISPERIDONE 0.25 MILLIGRAM(S): 4 TABLET ORAL at 06:04

## 2024-04-24 RX ADMIN — SENNA PLUS 2 TABLET(S): 8.6 TABLET ORAL at 21:06

## 2024-04-24 RX ADMIN — Medication 3 MILLIGRAM(S): at 21:06

## 2024-04-24 RX ADMIN — POLYETHYLENE GLYCOL 3350 17 GRAM(S): 17 POWDER, FOR SOLUTION ORAL at 11:19

## 2024-04-24 NOTE — PROGRESS NOTE ADULT - SUBJECTIVE AND OBJECTIVE BOX
pt seen and examined  4/24/24    MEDICATIONS  (STANDING):  melatonin 3 milliGRAM(s) Oral <User Schedule>  polyethylene glycol 3350 17 Gram(s) Oral daily  risperiDONE   Tablet 0.25 milliGRAM(s) Oral <User Schedule>  risperiDONE   Tablet 0.5 milliGRAM(s) Oral <User Schedule>  senna 2 Tablet(s) Oral at bedtime    MEDICATIONS  (PRN):  acetaminophen     Tablet .. 650 milliGRAM(s) Oral every 6 hours PRN Temp greater or equal to 38C (100.4F), Mild Pain (1 - 3)  artificial tears (preservative free) Ophthalmic Solution 1 Drop(s) Both EYES three times a day PRN Dry Eyes  haloperidol    Injectable 2.5 milliGRAM(s) IntraMuscular every 6 hours PRN Agitation    Vital Signs Last 24 Hrs  T(C): 36.8 (04-24-24 @ 19:52), Max: 36.8 (04-24-24 @ 19:52)  T(F): 98.3 (04-24-24 @ 19:52), Max: 98.3 (04-24-24 @ 19:52)  HR: 80 (04-24-24 @ 19:52) (68 - 80)  BP: 137/61 (04-24-24 @ 19:52) (124/58 - 137/61)  BP(mean): --  RR: 18 (04-24-24 @ 19:52) (17 - 18)  SpO2: 100% (04-24-24 @ 19:52) (100% - 100%)          GENERAL: NAD, thin elderly, comfortable on room air  HEAD:  Atraumatic, Normocephalic  EYES: EOMI, PERRLA, conjunctiva and sclera clear  NECK: Supple, No JVD  CHEST/LUNG:dec breath sounds at bases  HEART: Regular rate and rhythm; No murmurs, rubs, or gallops  ABDOMEN: Soft, Nontender, Nondistended; Bowel sounds present  Neuro: awake alert,  EXTREMITIES:  2+ Peripheral Pulses, No clubbing, cyanosis, trace b/l edema  SKIN: No rashes or lesions     LABS:        Creatinine Trend:     Urine Studies:

## 2024-04-25 RX ADMIN — RISPERIDONE 0.5 MILLIGRAM(S): 4 TABLET ORAL at 16:53

## 2024-04-25 RX ADMIN — SENNA PLUS 2 TABLET(S): 8.6 TABLET ORAL at 21:12

## 2024-04-25 RX ADMIN — RISPERIDONE 0.25 MILLIGRAM(S): 4 TABLET ORAL at 06:09

## 2024-04-25 RX ADMIN — Medication 3 MILLIGRAM(S): at 21:12

## 2024-04-25 NOTE — PROGRESS NOTE ADULT - SUBJECTIVE AND OBJECTIVE BOX
pt seen and examined  4/25/24    MEDICATIONS  (STANDING):  melatonin 3 milliGRAM(s) Oral <User Schedule>  polyethylene glycol 3350 17 Gram(s) Oral daily  risperiDONE   Tablet 0.25 milliGRAM(s) Oral <User Schedule>  risperiDONE   Tablet 0.5 milliGRAM(s) Oral <User Schedule>  senna 2 Tablet(s) Oral at bedtime    MEDICATIONS  (PRN):  acetaminophen     Tablet .. 650 milliGRAM(s) Oral every 6 hours PRN Temp greater or equal to 38C (100.4F), Mild Pain (1 - 3)  artificial tears (preservative free) Ophthalmic Solution 1 Drop(s) Both EYES three times a day PRN Dry Eyes  haloperidol    Injectable 2.5 milliGRAM(s) IntraMuscular every 6 hours PRN Agitation    Vital Signs Last 24 Hrs  T(C): 36.8 (04-25-24 @ 20:28), Max: 36.9 (04-25-24 @ 05:00)  T(F): 98.2 (04-25-24 @ 20:28), Max: 98.5 (04-25-24 @ 05:00)  HR: 80 (04-25-24 @ 20:28) (70 - 82)  BP: 152/61 (04-25-24 @ 20:28) (112/67 - 152/61)  BP(mean): --  RR: 18 (04-25-24 @ 20:28) (16 - 18)  SpO2: 100% (04-25-24 @ 20:28) (99% - 100%)          GENERAL: NAD, thin elderly, comfortable on room air  HEAD:  Atraumatic, Normocephalic  EYES: EOMI, PERRLA, conjunctiva and sclera clear  NECK: Supple, No JVD  CHEST/LUNG:dec breath sounds at bases  HEART: Regular rate and rhythm; No murmurs, rubs, or gallops  ABDOMEN: Soft, Nontender, Nondistended; Bowel sounds present  Neuro: awake alert,  EXTREMITIES:  2+ Peripheral Pulses, No clubbing, cyanosis, trace b/l edema  SKIN: No rashes or lesions     LABS:        Creatinine Trend:     Urine Studies:

## 2024-04-26 RX ADMIN — POLYETHYLENE GLYCOL 3350 17 GRAM(S): 17 POWDER, FOR SOLUTION ORAL at 17:19

## 2024-04-26 RX ADMIN — RISPERIDONE 0.5 MILLIGRAM(S): 4 TABLET ORAL at 17:15

## 2024-04-26 RX ADMIN — HALOPERIDOL DECANOATE 2.5 MILLIGRAM(S): 100 INJECTION INTRAMUSCULAR at 06:08

## 2024-04-26 RX ADMIN — SENNA PLUS 2 TABLET(S): 8.6 TABLET ORAL at 21:12

## 2024-04-26 RX ADMIN — Medication 3 MILLIGRAM(S): at 21:12

## 2024-04-26 RX ADMIN — RISPERIDONE 0.25 MILLIGRAM(S): 4 TABLET ORAL at 06:02

## 2024-04-26 NOTE — PROGRESS NOTE ADULT - SUBJECTIVE AND OBJECTIVE BOX
pt seen and examined  4/26/24    MEDICATIONS  (STANDING):  melatonin 3 milliGRAM(s) Oral <User Schedule>  polyethylene glycol 3350 17 Gram(s) Oral daily  risperiDONE   Tablet 0.25 milliGRAM(s) Oral <User Schedule>  risperiDONE   Tablet 0.5 milliGRAM(s) Oral <User Schedule>  senna 2 Tablet(s) Oral at bedtime    MEDICATIONS  (PRN):  acetaminophen     Tablet .. 650 milliGRAM(s) Oral every 6 hours PRN Temp greater or equal to 38C (100.4F), Mild Pain (1 - 3)  artificial tears (preservative free) Ophthalmic Solution 1 Drop(s) Both EYES three times a day PRN Dry Eyes  haloperidol    Injectable 2.5 milliGRAM(s) IntraMuscular every 6 hours PRN Agitation    Vital Signs Last 24 Hrs  T(C): 36.7 (04-26-24 @ 19:43), Max: 36.7 (04-26-24 @ 19:43)  T(F): 98.1 (04-26-24 @ 19:43), Max: 98.1 (04-26-24 @ 19:43)  HR: 73 (04-26-24 @ 19:43) (73 - 77)  BP: 134/54 (04-26-24 @ 19:43) (133/53 - 141/62)  BP(mean): --  RR: 18 (04-26-24 @ 19:43) (18 - 18)  SpO2: 99% (04-26-24 @ 19:43) (99% - 100%)          GENERAL: NAD, thin elderly, comfortable on room air  HEAD:  Atraumatic, Normocephalic  EYES: EOMI, PERRLA, conjunctiva and sclera clear  NECK: Supple, No JVD  CHEST/LUNG:dec breath sounds at bases  HEART: Regular rate and rhythm; No murmurs, rubs, or gallops  ABDOMEN: Soft, Nontender, Nondistended; Bowel sounds present  Neuro: awake alert,  EXTREMITIES:  2+ Peripheral Pulses, No clubbing, cyanosis, trace b/l edema  SKIN: No rashes or lesions     LABS:        Creatinine Trend:     Urine Studies:

## 2024-04-27 RX ADMIN — POLYETHYLENE GLYCOL 3350 17 GRAM(S): 17 POWDER, FOR SOLUTION ORAL at 11:30

## 2024-04-27 RX ADMIN — RISPERIDONE 0.25 MILLIGRAM(S): 4 TABLET ORAL at 05:31

## 2024-04-27 RX ADMIN — SENNA PLUS 2 TABLET(S): 8.6 TABLET ORAL at 20:40

## 2024-04-27 RX ADMIN — RISPERIDONE 0.5 MILLIGRAM(S): 4 TABLET ORAL at 16:54

## 2024-04-27 RX ADMIN — Medication 3 MILLIGRAM(S): at 20:40

## 2024-04-27 NOTE — PROGRESS NOTE ADULT - SUBJECTIVE AND OBJECTIVE BOX
pt seen and examined  4/27/24    MEDICATIONS  (STANDING):  melatonin 3 milliGRAM(s) Oral <User Schedule>  polyethylene glycol 3350 17 Gram(s) Oral daily  risperiDONE   Tablet 0.25 milliGRAM(s) Oral <User Schedule>  risperiDONE   Tablet 0.5 milliGRAM(s) Oral <User Schedule>  senna 2 Tablet(s) Oral at bedtime    MEDICATIONS  (PRN):  acetaminophen     Tablet .. 650 milliGRAM(s) Oral every 6 hours PRN Temp greater or equal to 38C (100.4F), Mild Pain (1 - 3)  artificial tears (preservative free) Ophthalmic Solution 1 Drop(s) Both EYES three times a day PRN Dry Eyes  haloperidol    Injectable 2.5 milliGRAM(s) IntraMuscular every 6 hours PRN Agitation    Vital Signs Last 24 Hrs  T(C): 36.4 (04-27-24 @ 20:24), Max: 36.4 (04-27-24 @ 20:24)  T(F): 97.6 (04-27-24 @ 20:24), Max: 97.6 (04-27-24 @ 20:24)  HR: 78 (04-27-24 @ 20:24) (72 - 78)  BP: 104/70 (04-27-24 @ 20:24) (104/70 - 120/58)  BP(mean): --  RR: 18 (04-27-24 @ 20:24) (18 - 18)  SpO2: 99% (04-27-24 @ 20:24) (99% - 100%)      GENERAL: NAD, thin elderly, comfortable on room air  HEAD:  Atraumatic, Normocephalic  EYES: EOMI, PERRLA, conjunctiva and sclera clear  NECK: Supple, No JVD  CHEST/LUNG:dec breath sounds at bases  HEART: Regular rate and rhythm; No murmurs, rubs, or gallops  ABDOMEN: Soft, Nontender, Nondistended; Bowel sounds present  Neuro: awake alert,  EXTREMITIES:  2+ Peripheral Pulses, No clubbing, cyanosis, trace b/l edema  SKIN: No rashes or lesions     LABS:        Creatinine Trend:     Urine Studies:

## 2024-04-28 RX ADMIN — SENNA PLUS 2 TABLET(S): 8.6 TABLET ORAL at 20:16

## 2024-04-28 RX ADMIN — HALOPERIDOL DECANOATE 2.5 MILLIGRAM(S): 100 INJECTION INTRAMUSCULAR at 02:45

## 2024-04-28 RX ADMIN — Medication 3 MILLIGRAM(S): at 20:16

## 2024-04-28 RX ADMIN — RISPERIDONE 0.25 MILLIGRAM(S): 4 TABLET ORAL at 06:59

## 2024-04-28 RX ADMIN — POLYETHYLENE GLYCOL 3350 17 GRAM(S): 17 POWDER, FOR SOLUTION ORAL at 17:29

## 2024-04-28 RX ADMIN — RISPERIDONE 0.5 MILLIGRAM(S): 4 TABLET ORAL at 17:29

## 2024-04-28 NOTE — PROGRESS NOTE ADULT - SUBJECTIVE AND OBJECTIVE BOX
pt seen and examined  4/28/24    MEDICATIONS  (STANDING):  melatonin 3 milliGRAM(s) Oral <User Schedule>  polyethylene glycol 3350 17 Gram(s) Oral daily  risperiDONE   Tablet 0.25 milliGRAM(s) Oral <User Schedule>  risperiDONE   Tablet 0.5 milliGRAM(s) Oral <User Schedule>  senna 2 Tablet(s) Oral at bedtime    MEDICATIONS  (PRN):  acetaminophen     Tablet .. 650 milliGRAM(s) Oral every 6 hours PRN Temp greater or equal to 38C (100.4F), Mild Pain (1 - 3)  artificial tears (preservative free) Ophthalmic Solution 1 Drop(s) Both EYES three times a day PRN Dry Eyes  haloperidol    Injectable 2.5 milliGRAM(s) IntraMuscular every 6 hours PRN Agitation    Vital Signs Last 24 Hrs  T(C): 36.6 (04-28-24 @ 21:48), Max: 36.8 (04-28-24 @ 11:02)  T(F): 97.9 (04-28-24 @ 21:48), Max: 98.2 (04-28-24 @ 11:02)  HR: 68 (04-28-24 @ 21:48) (66 - 76)  BP: 126/51 (04-28-24 @ 21:48) (126/51 - 147/88)  BP(mean): --  RR: 16 (04-28-24 @ 21:48) (16 - 18)  SpO2: 100% (04-28-24 @ 21:48) (100% - 100%)      GENERAL: NAD, thin elderly, comfortable on room air  HEAD:  Atraumatic, Normocephalic  EYES: EOMI, PERRLA, conjunctiva and sclera clear  NECK: Supple, No JVD  CHEST/LUNG:dec breath sounds at bases  HEART: Regular rate and rhythm; No murmurs, rubs, or gallops  ABDOMEN: Soft, Nontender, Nondistended; Bowel sounds present  Neuro: awake alert,  EXTREMITIES:  2+ Peripheral Pulses, No clubbing, cyanosis, trace b/l edema  SKIN: No rashes or lesions     LABS:        Creatinine Trend:     Urine Studies:

## 2024-04-29 ENCOUNTER — TRANSCRIPTION ENCOUNTER (OUTPATIENT)
Age: 84
End: 2024-04-29

## 2024-04-29 PROCEDURE — 73080 X-RAY EXAM OF ELBOW: CPT | Mod: 26,RT

## 2024-04-29 PROCEDURE — 70450 CT HEAD/BRAIN W/O DYE: CPT | Mod: 26

## 2024-04-29 PROCEDURE — 73060 X-RAY EXAM OF HUMERUS: CPT | Mod: 26,RT

## 2024-04-29 RX ORDER — ACETAMINOPHEN 500 MG
975 TABLET ORAL ONCE
Refills: 0 | Status: COMPLETED | OUTPATIENT
Start: 2024-04-29 | End: 2024-04-29

## 2024-04-29 RX ADMIN — Medication 3 MILLIGRAM(S): at 20:06

## 2024-04-29 RX ADMIN — SENNA PLUS 2 TABLET(S): 8.6 TABLET ORAL at 20:05

## 2024-04-29 RX ADMIN — Medication 975 MILLIGRAM(S): at 04:43

## 2024-04-29 RX ADMIN — RISPERIDONE 0.5 MILLIGRAM(S): 4 TABLET ORAL at 17:27

## 2024-04-29 RX ADMIN — RISPERIDONE 0.25 MILLIGRAM(S): 4 TABLET ORAL at 07:07

## 2024-04-29 RX ADMIN — Medication 975 MILLIGRAM(S): at 07:07

## 2024-04-29 RX ADMIN — POLYETHYLENE GLYCOL 3350 17 GRAM(S): 17 POWDER, FOR SOLUTION ORAL at 17:27

## 2024-04-29 NOTE — DISCHARGE NOTE NURSING/CASE MANAGEMENT/SOCIAL WORK - NSDCPEFALRISK_GEN_ALL_CORE
For information on Fall & Injury Prevention, visit: [email or web address] OR  [email or web address] OR  https://www.cdc.gov/steadi/patient.html

## 2024-04-29 NOTE — PROGRESS NOTE ADULT - SUBJECTIVE AND OBJECTIVE BOX
pt seen and examined  4/29/24    MEDICATIONS  (STANDING):  melatonin 3 milliGRAM(s) Oral <User Schedule>  polyethylene glycol 3350 17 Gram(s) Oral daily  risperiDONE   Tablet 0.25 milliGRAM(s) Oral <User Schedule>  risperiDONE   Tablet 0.5 milliGRAM(s) Oral <User Schedule>  senna 2 Tablet(s) Oral at bedtime    MEDICATIONS  (PRN):  acetaminophen     Tablet .. 650 milliGRAM(s) Oral every 6 hours PRN Temp greater or equal to 38C (100.4F), Mild Pain (1 - 3)  artificial tears (preservative free) Ophthalmic Solution 1 Drop(s) Both EYES three times a day PRN Dry Eyes  haloperidol    Injectable 2.5 milliGRAM(s) IntraMuscular every 6 hours PRN Agitation    GENERAL: NAD, thin elderly, comfortable on room air  HEAD:  Atraumatic, Normocephalic  EYES: EOMI, PERRLA, conjunctiva and sclera clear  NECK: Supple, No JVD  CHEST/LUNG:dec breath sounds at bases  HEART: Regular rate and rhythm; No murmurs, rubs, or gallops  ABDOMEN: Soft, Nontender, Nondistended; Bowel sounds present  Neuro: awake alert,  EXTREMITIES:  2+ Peripheral Pulses, No clubbing, cyanosis, trace b/l edema  SKIN: No rashes or lesions     LABS:        Creatinine Trend:     Urine Studies:

## 2024-04-29 NOTE — PROGRESS NOTE ADULT - PROBLEM SELECTOR PLAN 2
- Continue Ceftriaxone started by ED  - f/u culture
-off abx
-off abx
waiting for dispo
waiting for dispo    CM follow up.  went to court for the guardianship 3/1/24.   dc planning in progress.
waiting for dispo    CM follow up.  went to court for the guardianship 3/1/24.   dc planning in progress.
waiting for dispo
waiting for dispo    CM follow up.  went to court for the guardianship 3/1/24.   dc planning in progress.
- Continue Ceftriaxone started by ED  - f/u culture
-off abx
waiting for dispo
waiting for dispo    CM follow up.  went to court for the guardianship 3/1/24.   dc planning in progress.
-off abx
waiting for dispo
waiting for dispo    CM follow up.  went to court for the guardianship 3/1/24.   dc planning in progress.
-off abx
waiting for dispo
waiting for dispo    CM follow up.  went to court for the guardianship 3/1/24.   dc planning in progress.
waiting for dispo
waiting for dispo    CM follow up.  went to court for the guardianship 3/1/24.   dc planning in progress.
-off abx
waiting for dispo
waiting for dispo
waiting for dispo    CM follow up.  went to court for the guardianship 3/1/24.   dc planning in progress.
-off abx
-off abx
waiting for dispo
waiting for dispo
waiting for dispo    CM follow up.  went to court for the guardianship 3/1/24.   dc planning in progress.
-off abx
waiting for dispo
waiting for dispo
waiting for dispo    CM follow up.  went to court for the guardianship 3/1/24.   dc planning in progress.
-off abx
-off abx
waiting for dispo
waiting for dispo    CM follow up.  went to court for the guardianship 3/1/24.   dc planning in progress.
waiting for dispo
waiting for dispo    CM follow up.  went to court for the guardianship 3/1/24.   dc planning in progress.
-off abx
-off abx
waiting for dispo
- Continue Ceftriaxone started by ED  - f/u culture
-off abx
- Continue Ceftriaxone started by ED  - f/u culture
-off abx
-off abx
- Continue Ceftriaxone started by ED  - f/u culture
-off abx

## 2024-04-29 NOTE — PROGRESS NOTE ADULT - PROBLEM/PLAN-2
DISPLAY PLAN FREE TEXT

## 2024-04-29 NOTE — DISCHARGE NOTE NURSING/CASE MANAGEMENT/SOCIAL WORK - NSDPFAC_GEN_ALL_CORE
[facility], [facility]. Phone: [phone]  Address: [address] [place] [address]

## 2024-04-29 NOTE — PROGRESS NOTE ADULT - PROBLEM SELECTOR PLAN 1
Likely delirium 2/2 acute infection with underlying dementia.  - off abx  psych f/u
Likely delirium 2/2 acute infection with underlying dementia.  - off abx  psych f/u: antipsychotic meds per psyc
Likely delirium 2/2 acute infection with underlying dementia.  - off abx  psych f/u
Likely delirium 2/2 acute infection with underlying dementia.  - off abx  psych f/u: antipsychotic meds per psyc
Likely delirium 2/2 acute infection with underlying dementia.  - off abx  psych f/u
Likely delirium 2/2 acute infection with underlying dementia.  - off abx  psych f/u: antipsychotic meds per psyc
Likely delirium 2/2 acute infection with underlying dementia.  - off abx  psych f/u
Likely delirium 2/2 acute infection with underlying dementia.  - off abx  psych f/u: antipsychotic meds per psyc
Likely delirium 2/2 acute infection with underlying dementia.  - off abx  psych f/u
Likely delirium 2/2 acute infection with underlying dementia.  - off abx  psych f/u
Likely delirium 2/2 acute infection with underlying dementia.  - off abx  psych f/u: antipsychotic meds per psyc
Likely delirium 2/2 acute infection with underlying dementia.  - off abx  psych f/u
Likely delirium 2/2 acute infection with underlying dementia.  - off abx  psych f/u: antipsychotic meds per psyc
Likely delirium 2/2 acute infection with underlying dementia.  - IV Ceftriaxone  - PRN Haldol  ID eval appreciated
Likely delirium 2/2 acute infection with underlying dementia.  - off abx  psych f/u
Likely delirium 2/2 acute infection with underlying dementia.  - off abx  psych f/u: antipsychotic meds per psyc
Likely delirium 2/2 acute infection with underlying dementia.  - off abx  psych f/u
Likely delirium 2/2 acute infection with underlying dementia.  - off abx  psych f/u: antipsychotic meds per psyc
Likely delirium 2/2 acute infection with underlying dementia.  - off abx  psych f/u
Likely delirium 2/2 acute infection with underlying dementia.  - off abx  psych f/u
Likely delirium 2/2 acute infection with underlying dementia.  - off abx  psych f/u: antipsychotic meds per psyc
Likely delirium 2/2 acute infection with underlying dementia.  - IV Ceftriaxone  - PRN Haldol  ID f/u
Likely delirium 2/2 acute infection with underlying dementia.  - off abx  psych f/u
Likely delirium 2/2 acute infection with underlying dementia.  - off abx  psych f/u: antipsychotic meds per psyc
Likely delirium 2/2 acute infection with underlying dementia.  - off abx  psych f/u
Likely delirium 2/2 acute infection with underlying dementia.  - off abx  psych f/u: antipsychotic meds per psyc
Likely delirium 2/2 acute infection with underlying dementia.  - off abx  psych f/u
Likely delirium 2/2 acute infection with underlying dementia.  - off abx  psych f/u
Likely delirium 2/2 acute infection with underlying dementia.  - IV Ceftriaxone  - PRN Haldol  ID f/u
Likely delirium 2/2 acute infection with underlying dementia.  - off abx  psych f/u
Likely delirium 2/2 acute infection with underlying dementia.  - IV Ceftriaxone  - PRN Haldol  ID f/u
Likely delirium 2/2 acute infection with underlying dementia.  - off abx  psych f/u
Likely delirium 2/2 acute infection with underlying dementia.  - IV Ceftriaxone  - PRN Haldol  ID eval appreciated
Likely delirium 2/2 acute infection with underlying dementia.  - off abx  psych f/u

## 2024-04-29 NOTE — PROGRESS NOTE ADULT - PROBLEM SELECTOR PROBLEM 1
Metabolic encephalopathy

## 2024-04-29 NOTE — PROGRESS NOTE ADULT - PROBLEM SELECTOR PROBLEM 2
Acute cystitis
Need for case management follow-up
Acute cystitis
Need for case management follow-up
Acute cystitis
Need for case management follow-up
Acute cystitis
Need for case management follow-up
Acute cystitis
Acute cystitis
Need for case management follow-up
Acute cystitis
Acute cystitis
Need for case management follow-up
Acute cystitis
Need for case management follow-up
Acute cystitis
Need for case management follow-up
Acute cystitis

## 2024-04-29 NOTE — PROGRESS NOTE ADULT - NUTRITIONAL ASSESSMENT
This patient has been assessed with a concern for Malnutrition and has been determined to have a diagnosis/diagnoses of Moderate protein-calorie malnutrition.    This patient is being managed with:   Diet Regular-  Entered: Dec 29 2023 10:07PM  
This patient has been assessed with a concern for Malnutrition and has been determined to have a diagnosis/diagnoses of Moderate protein-calorie malnutrition.    This patient is being managed with:   Diet Regular-  Entered: Dec 29 2023 10:07PM    This patient has been assessed with a concern for Malnutrition and has been determined to have a diagnosis/diagnoses of Moderate protein-calorie malnutrition.    This patient is being managed with:   Diet Regular-  Entered: Dec 29 2023 10:07PM  
This patient has been assessed with a concern for Malnutrition and has been determined to have a diagnosis/diagnoses of Moderate protein-calorie malnutrition.    This patient is being managed with:   Diet Regular-  Entered: Dec 29 2023 10:07PM    This patient has been assessed with a concern for Malnutrition and has been determined to have a diagnosis/diagnoses of Moderate protein-calorie malnutrition.    This patient is being managed with:   Diet Regular-  Entered: Dec 29 2023 10:07PM  
This patient has been assessed with a concern for Malnutrition and has been determined to have a diagnosis/diagnoses of Moderate protein-calorie malnutrition.    This patient is being managed with:   Diet Regular-  Entered: Dec 29 2023 10:07PM  
This patient has been assessed with a concern for Malnutrition and has been determined to have a diagnosis/diagnoses of Moderate protein-calorie malnutrition.    This patient is being managed with:   Diet Regular-  Entered: Dec 29 2023 10:07PM    This patient has been assessed with a concern for Malnutrition and has been determined to have a diagnosis/diagnoses of Moderate protein-calorie malnutrition.    This patient is being managed with:   Diet Regular-  Entered: Dec 29 2023 10:07PM  
This patient has been assessed with a concern for Malnutrition and has been determined to have a diagnosis/diagnoses of Moderate protein-calorie malnutrition.    This patient is being managed with:   Diet Regular-  Entered: Dec 29 2023 10:07PM  
This patient has been assessed with a concern for Malnutrition and has been determined to have a diagnosis/diagnoses of Moderate protein-calorie malnutrition.    This patient is being managed with:   Diet Regular-  Entered: Dec 29 2023 10:07PM    This patient has been assessed with a concern for Malnutrition and has been determined to have a diagnosis/diagnoses of Moderate protein-calorie malnutrition.    This patient is being managed with:   Diet Regular-  Entered: Dec 29 2023 10:07PM  
This patient has been assessed with a concern for Malnutrition and has been determined to have a diagnosis/diagnoses of Moderate protein-calorie malnutrition.    This patient is being managed with:   Diet Regular-  Entered: Dec 29 2023 10:07PM  
This patient has been assessed with a concern for Malnutrition and has been determined to have a diagnosis/diagnoses of Moderate protein-calorie malnutrition.    This patient is being managed with:   Diet Regular-  Entered: Dec 29 2023 10:07PM    This patient has been assessed with a concern for Malnutrition and has been determined to have a diagnosis/diagnoses of Moderate protein-calorie malnutrition.    This patient is being managed with:   Diet Regular-  Entered: Dec 29 2023 10:07PM  
This patient has been assessed with a concern for Malnutrition and has been determined to have a diagnosis/diagnoses of Moderate protein-calorie malnutrition.    This patient is being managed with:   Diet Regular-  Entered: Dec 29 2023 10:07PM  
This patient has been assessed with a concern for Malnutrition and has been determined to have a diagnosis/diagnoses of Moderate protein-calorie malnutrition.    This patient is being managed with:   Diet Regular-  Entered: Dec 29 2023 10:07PM    This patient has been assessed with a concern for Malnutrition and has been determined to have a diagnosis/diagnoses of Moderate protein-calorie malnutrition.    This patient is being managed with:   Diet Regular-  Entered: Dec 29 2023 10:07PM  
This patient has been assessed with a concern for Malnutrition and has been determined to have a diagnosis/diagnoses of Moderate protein-calorie malnutrition.    This patient is being managed with:   Diet Regular-  Entered: Dec 29 2023 10:07PM  
This patient has been assessed with a concern for Malnutrition and has been determined to have a diagnosis/diagnoses of Moderate protein-calorie malnutrition.    This patient is being managed with:   Diet Regular-  Entered: Dec 29 2023 10:07PM    This patient has been assessed with a concern for Malnutrition and has been determined to have a diagnosis/diagnoses of Moderate protein-calorie malnutrition.    This patient is being managed with:   Diet Regular-  Entered: Dec 29 2023 10:07PM  
This patient has been assessed with a concern for Malnutrition and has been determined to have a diagnosis/diagnoses of Moderate protein-calorie malnutrition.    This patient is being managed with:   Diet Regular-  Entered: Dec 29 2023 10:07PM  
This patient has been assessed with a concern for Malnutrition and has been determined to have a diagnosis/diagnoses of Moderate protein-calorie malnutrition.    This patient is being managed with:   Diet Regular-  Entered: Dec 29 2023 10:07PM    This patient has been assessed with a concern for Malnutrition and has been determined to have a diagnosis/diagnoses of Moderate protein-calorie malnutrition.    This patient is being managed with:   Diet Regular-  Entered: Dec 29 2023 10:07PM  
This patient has been assessed with a concern for Malnutrition and has been determined to have a diagnosis/diagnoses of Moderate protein-calorie malnutrition.    This patient is being managed with:   Diet Regular-  Entered: Dec 29 2023 10:07PM  
This patient has been assessed with a concern for Malnutrition and has been determined to have a diagnosis/diagnoses of Moderate protein-calorie malnutrition.    This patient is being managed with:   Diet Regular-  Entered: Dec 29 2023 10:07PM    This patient has been assessed with a concern for Malnutrition and has been determined to have a diagnosis/diagnoses of Moderate protein-calorie malnutrition.    This patient is being managed with:   Diet Regular-  Entered: Dec 29 2023 10:07PM  
This patient has been assessed with a concern for Malnutrition and has been determined to have a diagnosis/diagnoses of Moderate protein-calorie malnutrition.    This patient is being managed with:   Diet Regular-  Entered: Dec 29 2023 10:07PM    This patient has been assessed with a concern for Malnutrition and has been determined to have a diagnosis/diagnoses of Moderate protein-calorie malnutrition.    This patient is being managed with:   Diet Regular-  Entered: Dec 29 2023 10:07PM  
This patient has been assessed with a concern for Malnutrition and has been determined to have a diagnosis/diagnoses of Moderate protein-calorie malnutrition.    This patient is being managed with:   Diet Regular-  Entered: Dec 29 2023 10:07PM  
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
## 2024-04-29 NOTE — CHART NOTE - NSCHARTNOTESELECT_GEN_ALL_CORE
Event Note
Event Note
Nutrition Services
Event Note
Follow-up/Nutrition Services
Nutrition Services
Nutrition Services

## 2024-04-29 NOTE — DISCHARGE NOTE NURSING/CASE MANAGEMENT/SOCIAL WORK - PATIENT PORTAL LINK FT
You can access the FollowMyHealth Patient Portal offered by [facility] by registering at the following website: [email or web address]. By joining [employer]’s FollowMyHealth portal, you will also be able to view your health information using other applications (apps) compatible with our system.

## 2024-04-29 NOTE — PROGRESS NOTE ADULT - REASON FOR ADMISSION
confusion

## 2024-04-29 NOTE — PROGRESS NOTE ADULT - PROVIDER SPECIALTY LIST ADULT
Internal Medicine
Neurology
Internal Medicine
Neurology
Internal Medicine
Neurology
Infectious Disease
Internal Medicine
Neurology
Infectious Disease
Internal Medicine
Neurology
Internal Medicine

## 2024-04-30 ENCOUNTER — TRANSCRIPTION ENCOUNTER (OUTPATIENT)
Age: 84
End: 2024-04-30

## 2024-04-30 VITALS
TEMPERATURE: 98 F | OXYGEN SATURATION: 94 % | SYSTOLIC BLOOD PRESSURE: 140 MMHG | RESPIRATION RATE: 16 BRPM | DIASTOLIC BLOOD PRESSURE: 62 MMHG | HEART RATE: 101 BPM

## 2024-04-30 RX ORDER — LANOLIN ALCOHOL/MO/W.PET/CERES
1 CREAM (GRAM) TOPICAL
Qty: 0 | Refills: 0 | DISCHARGE
Start: 2024-04-30

## 2024-04-30 RX ORDER — RISPERIDONE 4 MG/1
1 TABLET ORAL
Qty: 0 | Refills: 0 | DISCHARGE
Start: 2024-04-30

## 2024-04-30 RX ORDER — SENNA PLUS 8.6 MG/1
2 TABLET ORAL
Qty: 0 | Refills: 0 | DISCHARGE
Start: 2024-04-30

## 2024-04-30 RX ORDER — POLYETHYLENE GLYCOL 3350 17 G/17G
17 POWDER, FOR SOLUTION ORAL
Qty: 0 | Refills: 0 | DISCHARGE
Start: 2024-04-30

## 2024-04-30 RX ADMIN — RISPERIDONE 0.5 MILLIGRAM(S): 4 TABLET ORAL at 16:23

## 2024-04-30 RX ADMIN — RISPERIDONE 0.25 MILLIGRAM(S): 4 TABLET ORAL at 08:13

## 2024-04-30 NOTE — DISCHARGE NOTE PROVIDER - CARE PROVIDER_API CALL
to be evaluated by PCP w/ in 1 week of discharge,   Phone: (   )    -  Fax: (   )    -  Follow Up Time:

## 2024-04-30 NOTE — DISCHARGE NOTE PROVIDER - HOSPITAL COURSE
82 y/o Female, with a PmHx of possible Dementia, p/w delirium. Pending placement and guardianship.  Metabolic Encephalopathy- likely delirium 2/2 UTI and COVID  - CT Head negative  - RPR negative, B12 and folate WNL    UTI  - s/p CTX x 3 days   - ucx contaminated, bcx neg      Dementia w/agitation  - Psych c/s following -  increased Seroquel, Zyprexa PRN    - guardianship approved, pending placement    COVID Positive 1/9,   - on RA      LE Edema - Doppler LE neg      4/30 pt is medically cleared for discharge

## 2024-04-30 NOTE — DISCHARGE NOTE PROVIDER - NSDCMRMEDTOKEN_GEN_ALL_CORE_FT
melatonin 3 mg oral tablet: 1 tab(s) orally once a day  ocular lubricant ophthalmic solution: 1 drop(s) to each affected eye 3 times a day As needed Dry Eyes  polyethylene glycol 3350 oral powder for reconstitution: 17 gram(s) orally once a day  risperiDONE 0.25 mg oral tablet: 1 tab(s) orally once a day in AM  risperiDONE 0.5 mg oral tablet: 1 tab(s) orally once a day at 5 PM  senna leaf extract oral tablet: 2 tab(s) orally once a day (at bedtime)

## 2024-04-30 NOTE — DISCHARGE NOTE PROVIDER - NSDCCPCAREPLAN_GEN_ALL_CORE_FT
PRINCIPAL DISCHARGE DIAGNOSIS  Diagnosis: Acute UTI  Assessment and Plan of Treatment: - s/p CTX (3/28-3/30)   pt has been asymptomatic      SECONDARY DISCHARGE DIAGNOSES  Diagnosis: AMS (altered mental status)  Assessment and Plan of Treatment: Continue  Risperdal 0.5mg at 5pm  0.25 mg in AM at 7 AM    Diagnosis: Adult failure to thrive  Assessment and Plan of Treatment: encourage PO intake, can offer Ensure 1 can daily

## 2024-04-30 NOTE — DISCHARGE NOTE PROVIDER - NSDCACTIVITY_GEN_ALL_CORE
Guero Smyth  pt calling to schedule -Do we have Cardiac clearance?  Received: Today  Message Contents   TRACEY Lester [facility] Nurse Msg Pool             Patient's wife is calling to schedule EGD for patient. She wants to schedule ASAP.   Per orders, patient is having testing today and to schedule procedure after clearance.     Is it OK to schedule patient? Do we have clearance?   Please confirm if ok to schedule at ASC or does he need hospital setting?     Thank you         Walking - Indoors allowed

## 2024-05-12 ENCOUNTER — EMERGENCY (EMERGENCY)
Facility: HOSPITAL | Age: 84
LOS: 1 days | Discharge: HOME | End: 2024-05-12
Attending: STUDENT IN AN ORGANIZED HEALTH CARE EDUCATION/TRAINING PROGRAM | Admitting: STUDENT IN AN ORGANIZED HEALTH CARE EDUCATION/TRAINING PROGRAM
Payer: MEDICARE

## 2024-05-12 VITALS
HEART RATE: 64 BPM | TEMPERATURE: 98 F | OXYGEN SATURATION: 100 % | SYSTOLIC BLOOD PRESSURE: 168 MMHG | DIASTOLIC BLOOD PRESSURE: 73 MMHG | RESPIRATION RATE: 16 BRPM

## 2024-05-12 LAB
APPEARANCE UR: ABNORMAL
BACTERIA # UR AUTO: ABNORMAL /HPF
BILIRUB UR-MCNC: NEGATIVE — SIGNIFICANT CHANGE UP
CAST: 0 /LPF — SIGNIFICANT CHANGE UP (ref 0–4)
COLOR SPEC: YELLOW — SIGNIFICANT CHANGE UP
DIFF PNL FLD: ABNORMAL
GLUCOSE UR QL: NEGATIVE MG/DL — SIGNIFICANT CHANGE UP
KETONES UR-MCNC: NEGATIVE MG/DL — SIGNIFICANT CHANGE UP
LEUKOCYTE ESTERASE UR-ACNC: ABNORMAL
NITRITE UR-MCNC: POSITIVE
PH UR: 6 — SIGNIFICANT CHANGE UP (ref 5–8)
PROT UR-MCNC: 30 MG/DL
RBC CASTS # UR COMP ASSIST: SIGNIFICANT CHANGE UP /HPF (ref 0–4)
SP GR SPEC: 1.02 — SIGNIFICANT CHANGE UP (ref 1–1.03)
SQUAMOUS # UR AUTO: 4 /HPF — SIGNIFICANT CHANGE UP (ref 0–5)
UROBILINOGEN FLD QL: 0.2 MG/DL — SIGNIFICANT CHANGE UP (ref 0.2–1)
WBC UR QL: 26 /HPF — HIGH (ref 0–5)

## 2024-05-12 PROCEDURE — 99284 EMERGENCY DEPT VISIT MOD MDM: CPT | Mod: GC

## 2024-05-12 PROCEDURE — 71045 X-RAY EXAM CHEST 1 VIEW: CPT | Mod: 26

## 2024-05-12 RX ORDER — CEFPODOXIME PROXETIL 100 MG
100 TABLET ORAL ONCE
Refills: 0 | Status: COMPLETED | OUTPATIENT
Start: 2024-05-12 | End: 2024-05-12

## 2024-05-12 RX ORDER — OLANZAPINE 15 MG/1
1.25 TABLET, FILM COATED ORAL ONCE
Refills: 0 | Status: COMPLETED | OUTPATIENT
Start: 2024-05-12 | End: 2024-05-12

## 2024-05-12 RX ORDER — QUETIAPINE FUMARATE 200 MG/1
25 TABLET, FILM COATED ORAL ONCE
Refills: 0 | Status: COMPLETED | OUTPATIENT
Start: 2024-05-12 | End: 2024-05-12

## 2024-05-12 RX ORDER — CEFPODOXIME PROXETIL 100 MG
1 TABLET ORAL
Qty: 14 | Refills: 0
Start: 2024-05-12 | End: 2024-05-18

## 2024-05-12 RX ADMIN — Medication 100 MILLIGRAM(S): at 12:28

## 2024-05-12 RX ADMIN — QUETIAPINE FUMARATE 25 MILLIGRAM(S): 200 TABLET, FILM COATED ORAL at 12:49

## 2024-05-12 RX ADMIN — OLANZAPINE 1.25 MILLIGRAM(S): 15 TABLET, FILM COATED ORAL at 13:07

## 2024-05-12 NOTE — ED PROVIDER NOTE - NSFOLLOWUPINSTRUCTIONS_ED_ALL_ED_FT
Please take cefpodixime 100mg twice a day for 7 days.       A urinary tract infection (UTI) is an infection of any part of the urinary tract, which includes the kidneys, ureters, bladder, and urethra. Risk factors include ignoring your need to urinate, wiping back to front if female, being an uncircumcised male, and having diabetes or a weak immune system. Symptoms include frequent urination, pain or burning with urination, foul smelling urine, cloudy urine, pain in the lower abdomen, blood in the urine, and fever. If you were prescribed an antibiotic medicine, take it as told by your health care provider. Do not stop taking the antibiotic even if you start to feel better.    SEEK IMMEDIATE MEDICAL CARE IF YOU HAVE ANY OF THE FOLLOWING SYMPTOMS: severe back or abdominal pain, fever, inability to keep fluids or medicine down, dizziness/lightheadedness, or a change in mental status.
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
## 2024-05-12 NOTE — ED PROVIDER NOTE - PROGRESS NOTE DETAILS
notified [facility] of retunr and ABX need [employer] PGY 3 Patient does not require psych evaluation patient has known history of agitation seen by psych on Seroquel and Zyprexa
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
## 2024-05-12 NOTE — ED PROVIDER NOTE - PATIENT PORTAL LINK FT
You can access the FollowMyHealth Patient Portal offered by [facility] by registering at the following website: [email or web address]. By joining [employer]’s FollowMyHealth portal, you will also be able to view your health information using other applications (apps) compatible with our system.

## 2024-05-12 NOTE — ED ADULT NURSE NOTE - NSFALLRISKINTERV_ED_ALL_ED

## 2024-05-12 NOTE — PROVIDER CONTACT NOTE (OTHER) - ASSESSMENT
Medical team referred this case as pt needs a ride to her facility. Pt is 84y/o female and as per medical team informed that pt needs BLS for safe discharge –weak and unsteady gait.  Address. [facility] [address] [place] [address]. Phone: [phone]. Non Emergent ambulance form was signed by MD and has been attached to the pts envelope for EMS. Medical team informed this writer that they have already contacted [facility] and discussed about the discharge plan. . Writer contacted [facility] [phone] spoke with Ms. Candace Ladd # [id number]   ETA 15:00  .   Medical team and pt was notified  time. No further SW intervention needed for this visit. Medical team referred this case as pt needs a ride to her facility. Pt is 84y/o female and as per medical team informed that pt needs BLS for safe discharge –weak and unsteady gait.  Address. [facility] [address] [place] [address]. Phone: [phone]. Non Emergent ambulance form was signed by MD and has been attached to the pts envelope for EMS. Medical team informed this writer that they have already contacted [facility] and discussed about the discharge plan. . Writer contacted [facility] [phone] spoke with MsAdonay Candace - Wilberto # [id number]   ETA 15:00 [facility] is running behind due to lot of emergency.   Medical team and pt was notified  time. No further SW intervention needed for this visit.
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
## 2024-05-12 NOTE — ED PROVIDER NOTE - PHYSICAL EXAMINATION
GENERAL: Awake, alert, NAD  HEENT: Atraumatic no ecchymosis noted non tender c spine w/ full rom of neck   LUNGS: non labored breathing   ABDOMEN: Soft,  non tender, non distended, no rebound, no guarding  EXT: No edema, no calf tenderness,, no deformities.  NEURO: A&Ox1. Moving all extremities.  SKIN: Warm and dry. No rash.  PSYCH: Normal affect.

## 2024-05-12 NOTE — ED ADULT NURSE NOTE - NSSUHOSCREENINGYN_ED_ALL_ED
Yes - the patient is able to be screened Asthma    Diabetes    Hypertension    UTI (urinary tract infection)
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
## 2024-05-15 LAB
-  AMOXICILLIN/CLAVULANIC ACID: SIGNIFICANT CHANGE UP
-  AMPICILLIN/SULBACTAM: SIGNIFICANT CHANGE UP
-  AMPICILLIN: SIGNIFICANT CHANGE UP
-  AZTREONAM: SIGNIFICANT CHANGE UP
-  CEFAZOLIN: SIGNIFICANT CHANGE UP
-  CEFEPIME: SIGNIFICANT CHANGE UP
-  CEFOXITIN: SIGNIFICANT CHANGE UP
-  CEFTRIAXONE: SIGNIFICANT CHANGE UP
-  CEFUROXIME: SIGNIFICANT CHANGE UP
-  CIPROFLOXACIN: SIGNIFICANT CHANGE UP
-  ERTAPENEM: SIGNIFICANT CHANGE UP
-  GENTAMICIN: SIGNIFICANT CHANGE UP
-  IMIPENEM: SIGNIFICANT CHANGE UP
-  LEVOFLOXACIN: SIGNIFICANT CHANGE UP
-  MEROPENEM: SIGNIFICANT CHANGE UP
-  NITROFURANTOIN: SIGNIFICANT CHANGE UP
-  PIPERACILLIN/TAZOBACTAM: SIGNIFICANT CHANGE UP
-  TOBRAMYCIN: SIGNIFICANT CHANGE UP
-  TRIMETHOPRIM/SULFAMETHOXAZOLE: SIGNIFICANT CHANGE UP
CULTURE RESULTS: ABNORMAL
METHOD TYPE: SIGNIFICANT CHANGE UP
ORGANISM # SPEC MICROSCOPIC CNT: ABNORMAL
ORGANISM # SPEC MICROSCOPIC CNT: ABNORMAL
SPECIMEN SOURCE: SIGNIFICANT CHANGE UP